# Patient Record
Sex: FEMALE | Race: WHITE | Employment: OTHER | URBAN - METROPOLITAN AREA
[De-identification: names, ages, dates, MRNs, and addresses within clinical notes are randomized per-mention and may not be internally consistent; named-entity substitution may affect disease eponyms.]

---

## 2017-09-15 PROBLEM — E11.42 TYPE 2 DIABETES MELLITUS WITH DIABETIC POLYNEUROPATHY, WITHOUT LONG-TERM CURRENT USE OF INSULIN (HCC): Status: ACTIVE | Noted: 2017-09-15

## 2017-09-15 PROBLEM — E78.2 MIXED HYPERLIPIDEMIA: Status: ACTIVE | Noted: 2017-09-15

## 2018-02-20 ENCOUNTER — APPOINTMENT (OUTPATIENT)
Dept: GENERAL RADIOLOGY | Age: 83
End: 2018-02-20
Attending: EMERGENCY MEDICINE
Payer: MEDICARE

## 2018-02-20 ENCOUNTER — APPOINTMENT (OUTPATIENT)
Dept: CT IMAGING | Age: 83
End: 2018-02-20
Attending: EMERGENCY MEDICINE
Payer: MEDICARE

## 2018-02-20 ENCOUNTER — HOME HEALTH ADMISSION (OUTPATIENT)
Dept: HOME HEALTH SERVICES | Facility: HOME HEALTH | Age: 83
End: 2018-02-20

## 2018-02-20 ENCOUNTER — HOSPITAL ENCOUNTER (EMERGENCY)
Age: 83
Discharge: HOME | End: 2018-02-20
Attending: EMERGENCY MEDICINE
Payer: MEDICARE

## 2018-02-20 VITALS
WEIGHT: 143 LBS | DIASTOLIC BLOOD PRESSURE: 86 MMHG | SYSTOLIC BLOOD PRESSURE: 188 MMHG | HEIGHT: 66 IN | HEART RATE: 69 BPM | RESPIRATION RATE: 13 BRPM | TEMPERATURE: 98 F | BODY MASS INDEX: 22.98 KG/M2 | OXYGEN SATURATION: 95 %

## 2018-02-20 DIAGNOSIS — I10 ESSENTIAL HYPERTENSION: ICD-10-CM

## 2018-02-20 DIAGNOSIS — S42.202A CLOSED FRACTURE OF PROXIMAL END OF LEFT HUMERUS, UNSPECIFIED FRACTURE MORPHOLOGY, INITIAL ENCOUNTER: Primary | ICD-10-CM

## 2018-02-20 DIAGNOSIS — S70.02XA CONTUSION OF LEFT HIP, INITIAL ENCOUNTER: ICD-10-CM

## 2018-02-20 DIAGNOSIS — R11.2 NAUSEA AND VOMITING, INTRACTABILITY OF VOMITING NOT SPECIFIED, UNSPECIFIED VOMITING TYPE: ICD-10-CM

## 2018-02-20 LAB
ALBUMIN SERPL-MCNC: 3.3 G/DL (ref 3.2–4.6)
ALBUMIN/GLOB SERPL: 0.8 {RATIO} (ref 1.2–3.5)
ALP SERPL-CCNC: 64 U/L (ref 50–136)
ALT SERPL-CCNC: 41 U/L (ref 12–65)
ANION GAP SERPL CALC-SCNC: 9 MMOL/L (ref 7–16)
AST SERPL-CCNC: 55 U/L (ref 15–37)
ATRIAL RATE: 70 BPM
BASOPHILS # BLD: 0 K/UL (ref 0–0.2)
BASOPHILS NFR BLD: 0 % (ref 0–2)
BILIRUB SERPL-MCNC: 0.5 MG/DL (ref 0.2–1.1)
BUN SERPL-MCNC: 5 MG/DL (ref 8–23)
CALCIUM SERPL-MCNC: 8 MG/DL (ref 8.3–10.4)
CALCULATED P AXIS, ECG09: 57 DEGREES
CALCULATED R AXIS, ECG10: 42 DEGREES
CALCULATED T AXIS, ECG11: 53 DEGREES
CHLORIDE SERPL-SCNC: 99 MMOL/L (ref 98–107)
CK SERPL-CCNC: 92 U/L (ref 21–215)
CO2 SERPL-SCNC: 29 MMOL/L (ref 21–32)
CREAT SERPL-MCNC: 0.61 MG/DL (ref 0.6–1)
DIAGNOSIS, 93000: NORMAL
DIFFERENTIAL METHOD BLD: ABNORMAL
EOSINOPHIL # BLD: 0.1 K/UL (ref 0–0.8)
EOSINOPHIL NFR BLD: 1 % (ref 0.5–7.8)
ERYTHROCYTE [DISTWIDTH] IN BLOOD BY AUTOMATED COUNT: 13.4 % (ref 11.9–14.6)
GLOBULIN SER CALC-MCNC: 4.1 G/DL (ref 2.3–3.5)
GLUCOSE SERPL-MCNC: 141 MG/DL (ref 65–100)
HCT VFR BLD AUTO: 33.9 % (ref 35.8–46.3)
HGB BLD-MCNC: 11.4 G/DL (ref 11.7–15.4)
IMM GRANULOCYTES # BLD: 0 K/UL (ref 0–0.5)
IMM GRANULOCYTES NFR BLD AUTO: 0 % (ref 0–5)
LYMPHOCYTES # BLD: 2.3 K/UL (ref 0.5–4.6)
LYMPHOCYTES NFR BLD: 19 % (ref 13–44)
MCH RBC QN AUTO: 30.4 PG (ref 26.1–32.9)
MCHC RBC AUTO-ENTMCNC: 33.6 G/DL (ref 31.4–35)
MCV RBC AUTO: 90.4 FL (ref 79.6–97.8)
MONOCYTES # BLD: 0.7 K/UL (ref 0.1–1.3)
MONOCYTES NFR BLD: 6 % (ref 4–12)
NEUTS SEG # BLD: 8.8 K/UL (ref 1.7–8.2)
NEUTS SEG NFR BLD: 74 % (ref 43–78)
P-R INTERVAL, ECG05: 196 MS
PLATELET # BLD AUTO: 324 K/UL (ref 150–450)
PMV BLD AUTO: 10.3 FL (ref 10.8–14.1)
POTASSIUM SERPL-SCNC: 3.2 MMOL/L (ref 3.5–5.1)
PROT SERPL-MCNC: 7.4 G/DL (ref 6.3–8.2)
Q-T INTERVAL, ECG07: 416 MS
QRS DURATION, ECG06: 84 MS
QTC CALCULATION (BEZET), ECG08: 449 MS
RBC # BLD AUTO: 3.75 M/UL (ref 4.05–5.25)
SODIUM SERPL-SCNC: 137 MMOL/L (ref 136–145)
TROPONIN I SERPL-MCNC: <0.02 NG/ML (ref 0.02–0.05)
VENTRICULAR RATE, ECG03: 70 BPM
WBC # BLD AUTO: 12 K/UL (ref 4.3–11.1)

## 2018-02-20 PROCEDURE — 74011250636 HC RX REV CODE- 250/636: Performed by: EMERGENCY MEDICINE

## 2018-02-20 PROCEDURE — 85025 COMPLETE CBC W/AUTO DIFF WBC: CPT | Performed by: EMERGENCY MEDICINE

## 2018-02-20 PROCEDURE — 73030 X-RAY EXAM OF SHOULDER: CPT

## 2018-02-20 PROCEDURE — 82550 ASSAY OF CK (CPK): CPT | Performed by: EMERGENCY MEDICINE

## 2018-02-20 PROCEDURE — 77030011943

## 2018-02-20 PROCEDURE — 81003 URINALYSIS AUTO W/O SCOPE: CPT | Performed by: EMERGENCY MEDICINE

## 2018-02-20 PROCEDURE — A4565 SLINGS: HCPCS

## 2018-02-20 PROCEDURE — 96375 TX/PRO/DX INJ NEW DRUG ADDON: CPT | Performed by: EMERGENCY MEDICINE

## 2018-02-20 PROCEDURE — 74011250637 HC RX REV CODE- 250/637: Performed by: EMERGENCY MEDICINE

## 2018-02-20 PROCEDURE — 93005 ELECTROCARDIOGRAM TRACING: CPT | Performed by: EMERGENCY MEDICINE

## 2018-02-20 PROCEDURE — 96374 THER/PROPH/DIAG INJ IV PUSH: CPT | Performed by: EMERGENCY MEDICINE

## 2018-02-20 PROCEDURE — 73502 X-RAY EXAM HIP UNI 2-3 VIEWS: CPT

## 2018-02-20 PROCEDURE — 51701 INSERT BLADDER CATHETER: CPT | Performed by: EMERGENCY MEDICINE

## 2018-02-20 PROCEDURE — 72125 CT NECK SPINE W/O DYE: CPT

## 2018-02-20 PROCEDURE — 80053 COMPREHEN METABOLIC PANEL: CPT | Performed by: EMERGENCY MEDICINE

## 2018-02-20 PROCEDURE — 71045 X-RAY EXAM CHEST 1 VIEW: CPT

## 2018-02-20 PROCEDURE — 70450 CT HEAD/BRAIN W/O DYE: CPT

## 2018-02-20 PROCEDURE — 84484 ASSAY OF TROPONIN QUANT: CPT | Performed by: EMERGENCY MEDICINE

## 2018-02-20 PROCEDURE — 96361 HYDRATE IV INFUSION ADD-ON: CPT | Performed by: EMERGENCY MEDICINE

## 2018-02-20 PROCEDURE — 99285 EMERGENCY DEPT VISIT HI MDM: CPT | Performed by: EMERGENCY MEDICINE

## 2018-02-20 RX ORDER — HYDRALAZINE HYDROCHLORIDE 20 MG/ML
10 INJECTION INTRAMUSCULAR; INTRAVENOUS
Status: COMPLETED | OUTPATIENT
Start: 2018-02-20 | End: 2018-02-20

## 2018-02-20 RX ORDER — ONDANSETRON 4 MG/1
4 TABLET, ORALLY DISINTEGRATING ORAL
Qty: 6 TAB | Refills: 0 | Status: SHIPPED | OUTPATIENT
Start: 2018-02-20 | End: 2018-08-15

## 2018-02-20 RX ORDER — SODIUM CHLORIDE 9 MG/ML
250 INJECTION, SOLUTION INTRAVENOUS ONCE
Status: COMPLETED | OUTPATIENT
Start: 2018-02-20 | End: 2018-02-20

## 2018-02-20 RX ORDER — MORPHINE SULFATE 4 MG/ML
2 INJECTION, SOLUTION INTRAMUSCULAR; INTRAVENOUS
Status: COMPLETED | OUTPATIENT
Start: 2018-02-20 | End: 2018-02-20

## 2018-02-20 RX ORDER — MORPHINE SULFATE 15 MG/1
7.5 TABLET ORAL
Qty: 12 TAB | Refills: 0 | Status: SHIPPED | OUTPATIENT
Start: 2018-02-20 | End: 2018-04-13

## 2018-02-20 RX ORDER — SODIUM CHLORIDE 9 MG/ML
100 INJECTION, SOLUTION INTRAVENOUS ONCE
Status: COMPLETED | OUTPATIENT
Start: 2018-02-20 | End: 2018-02-20

## 2018-02-20 RX ORDER — ACETAMINOPHEN 325 MG/1
650 TABLET ORAL
Status: COMPLETED | OUTPATIENT
Start: 2018-02-20 | End: 2018-02-20

## 2018-02-20 RX ORDER — ONDANSETRON 2 MG/ML
4 INJECTION INTRAMUSCULAR; INTRAVENOUS
Status: COMPLETED | OUTPATIENT
Start: 2018-02-20 | End: 2018-02-20

## 2018-02-20 RX ADMIN — HYDRALAZINE HYDROCHLORIDE 10 MG: 20 INJECTION INTRAMUSCULAR; INTRAVENOUS at 08:25

## 2018-02-20 RX ADMIN — ACETAMINOPHEN 650 MG: 325 TABLET ORAL at 09:06

## 2018-02-20 RX ADMIN — SODIUM CHLORIDE 100 ML/HR: 900 INJECTION, SOLUTION INTRAVENOUS at 07:08

## 2018-02-20 RX ADMIN — MORPHINE SULFATE 2 MG: 4 INJECTION, SOLUTION INTRAMUSCULAR; INTRAVENOUS at 09:14

## 2018-02-20 RX ADMIN — SODIUM CHLORIDE 250 ML: 900 INJECTION, SOLUTION INTRAVENOUS at 10:03

## 2018-02-20 RX ADMIN — SODIUM CHLORIDE 250 ML: 900 INJECTION, SOLUTION INTRAVENOUS at 07:08

## 2018-02-20 RX ADMIN — SODIUM CHLORIDE 250 ML: 900 INJECTION, SOLUTION INTRAVENOUS at 11:05

## 2018-02-20 RX ADMIN — ONDANSETRON 4 MG: 2 INJECTION INTRAMUSCULAR; INTRAVENOUS at 11:05

## 2018-02-20 NOTE — ED NOTES
Pt drowsy from morphine given. Sling placed on pt. This RN to wait until pt is more alert before ambulating. MD notified.

## 2018-02-20 NOTE — PROGRESS NOTES
[facility]  Face to Face Encounter    Patients Name: Bryan Moya    YOB: 1935    Ordering Physician: Susie Glass MD         Primary Diagnosis: Closed fracture of proximal end of left humerus, unspecified fracture morphology, initial encounter Bradley Proper; Essential hypertension [I10]      Date of Face to Face:   2/20/2018                                  Face to Face Encounter findings are related to primary reason for home care:   yes. 1. I certify that the patient needs intermittent care as follows: skilled nursing care:  skilled observation/assessment, patient education  physical therapy: strengthening, transfer training, gait/stair training, balance training and pt/caregiver education  occupational therapy:  ADL safety (ie. cooking, bathing, dressing)    2. I certify that this patient is homebound, that is: 1) patient requires the use of a cane, walker and wheelchair device, special transportation, or assistance of another to leave the home; or 2) patient's condition makes leaving the home medically contraindicated; and 3) patient has a normal inability to leave the home and leaving the home requires considerable and taxing effort. Patient may leave the home for infrequent and short duration for medical reasons, and occasional absences for non-medical reasons. Homebound status is due to the following functional limitations: Patient with strength deficits limiting the performance of all ADL's without caregiver assistance or the use of an assistive device. Patient with poor safety awareness and is at risk for falls without assistance of another person and the use of an assistive device. Patient with poor ambulation endurance limiting their safe ability to ascend/descend the required number of steps to leave the home.     3. I certify that this patient is under my care and that I, or a nurse practitioner or [phone] [id number], or clinical nurse specialist, or certified nurse midwife, working with me, had a Face-to-Face Encounter that meets the physician Face-to-Face Encounter requirements. The following are the clinical findings from the [address] encounter that support the need for skilled services and is a summary of the encounter:     See attached progess note      Darling Corona  2/20/2018      THE FOLLOWING TO BE COMPLETED BY THE COMMUNITY PHYSICIAN:    I concur with the findings described above from the F2F encounter that this patient is homebound and in need of a skilled service.     Certifying Physician: _____________________________________      Printed Certifying Physician Name: _____________________________________    Date: _________________

## 2018-02-20 NOTE — ED NOTES
I have reviewed discharge instructions with the patient. The patient verbalized understanding. Patient left ED via Discharge Method: stretcher to Home with [facility]    Opportunity for questions and clarification provided. Patient given 2 scripts. To continue your aftercare when you leave the hospital, you may receive an automated call from our care team to check in on how you are doing. This is a free service and part of our promise to provide the best care and service to meet your aftercare needs.  If you have questions, or wish to unsubscribe from this service please call [phone]. Thank you for Choosing our [facility] Emergency Department.

## 2018-02-20 NOTE — ED PROVIDER NOTES
HPI Comments: Patient was found on the floor next to her bed this morning. Apparently she had gotten up to go to the bathroom and fallen. She has no recollection of what happened and may have lost consciousness. His unclear how long she was on the floor for. She estimates 15 minutes. Family members report she has been talking out of her head and confused for a couple of days. She has had a cough for last couple of weeks but it had been improving over the last few days. Some recent increased urination reported. Patient is on metformin for diabetes yet drinks 6 sodas a day and the only thing she during eats is caked and junk food. Patient is a 81 y.o. female presenting with fall. The history is provided by the patient. Fall   The accident occurred 3 to 5 hours ago. She fell from a height of ground level. She landed on hard floor. Point of impact: left shoulder and left hip. Pain location: left shoulder and left hip. The pain is moderate. Associated symptoms include loss of consciousness. Pertinent negatives include no fever, no numbness, no abdominal pain, no nausea, no vomiting, no headaches and no tingling. The risk factors include being elderly. The symptoms are aggravated by use of injured limb.         Past Medical History:   Diagnosis Date    Anemia, unspecified     Calculus of kidney     Depressive disorder, not elsewhere classified     Diabetes ([address]     Hypertension     Hypocalcemia and hypomagnesemia of      Hypopotassemia     Hyposmolality and/or hyponatremia     Insomnia, unspecified     Irritable bowel syndrome     Other and unspecified hyperlipidemia 2015    Polyneuropathy in diabetes ([address]     Renal colic     Type II or unspecified type diabetes mellitus without mention of complication, not stated as uncontrolled     Unspecified essential hypertension        Past Surgical History:   Procedure Laterality Date    HX APPENDECTOMY      HX CHOLECYSTECTOMY      HX GYN      hysterectomy: Partial/ Ovaries remain    HX HEENT      HX TONSILLECTOMY      HX UROLOGICAL      Bladder Surgery         Family History:   Problem Relation Age of Onset    Hypertension Mother     Stroke Mother     Stroke Father     Hypertension Father        Social History     Social History    Marital status:      Spouse name: N/A    Number of children: N/A    Years of education: N/A     Occupational History    Not on file. Social History Main Topics    Smoking status: Never Smoker    Smokeless tobacco: Never Used    Alcohol use No    Drug use: No    Sexual activity: Not on file     Other Topics Concern    Not on file     Social History Narrative         ALLERGIES: Sulfa (sulfonamide antibiotics)    Review of Systems   Constitutional: Negative for chills and fever. HENT: Negative for congestion, rhinorrhea and sore throat. Eyes: Negative for photophobia and redness. Respiratory: Negative for cough and shortness of breath. Cardiovascular: Negative for chest pain and leg swelling. Gastrointestinal: Negative for abdominal pain, diarrhea, nausea and vomiting. Endocrine: Positive for polyuria. Negative for polydipsia. Genitourinary: Positive for frequency. Negative for dysuria. Musculoskeletal: Positive for neck pain. Negative for back pain and myalgias. Neurological: Positive for loss of consciousness. Negative for tingling, weakness, numbness and headaches. Vitals:    02/20/18 0554 02/20/18 0601 02/20/18 0645   BP: (!) 198/99 (!) 183/120 (!) 198/91   Pulse: 73 72 72   Resp: 18 17 13   Temp: 98 °F (36.7 °C)     SpO2: 94% 95% 95%   Weight: 64.9 kg (143 lb)     Height: 5' 6\" (1.676 m)              Physical Exam   Constitutional: She is oriented to person, place, and time. She appears well-developed and well-nourished. Eyes: Conjunctivae are normal. Pupils are equal, round, and reactive to light. Neck: Normal range of motion. Neck supple.  Spinous process tenderness (mild) present. No muscular tenderness present. Cardiovascular: Normal rate, regular rhythm and normal heart sounds. No murmur heard. Pulses:       Carotid pulses are 2+ on the right side, and 2+ on the left side. Radial pulses are 2+ on the right side, and 2+ on the left side. Dorsalis pedis pulses are 2+ on the right side, and 2+ on the left side. Posterior tibial pulses are 2+ on the right side, and 2+ on the left side. Pulmonary/Chest: Breath sounds normal. No respiratory distress. Abdominal: Soft. She exhibits no distension. There is no tenderness. There is no rebound and no guarding. Musculoskeletal: Normal range of motion. She exhibits tenderness (tenderness left lateralhip. No significant pain with internal and external rotation. No external rotation or shortening. No tenderness to palpation of the femur and knee or lower leg ankle or foot that is significant. Left lower extremity neurovascula). She exhibits no edema. There is pain and tenderness about the left proximal humerus and shoulder area. There is pain with internal and external rotation or attempts at flexion. No tenderness over the clavicle or scapula. No elbow forearm wrist or hand tenderness is noted. Left upper extremity is neurovascularly intact. Neurological: She is alert and oriented to person, place, and time. She has normal strength. No sensory deficit. Skin: Skin is warm and dry. Nursing note and vitals reviewed. MDM  Number of Diagnoses or Management Options  Closed fracture of proximal end of left humerus, unspecified fracture morphology, initial encounter:   Contusion of left hip, initial encounter:   Essential hypertension:   Nausea and vomiting, intractability of vomiting not specified, unspecified vomiting type:   Diagnosis management comments: Patient is able to stand and walk without any hip pain. Doubt fracture. X-ray was negative. MRI not needed.   Patient is awake and alert and oriented after some IV hydration. Blood pressure improved slightly but patient will take her medications that she did not take this morning upon return home.        Amount and/or Complexity of Data Reviewed  Clinical lab tests: ordered and reviewed (Results for orders placed or performed during the hospital encounter of 02/20/18  -CBC WITH AUTOMATED DIFF       Result                                            Value                         Ref Range                       WBC                                               12.0 (H)                      4.3 - 11.1 K/uL                 RBC                                               3.75 (L)                      4.05 - 5.25 M/uL                HGB                                               11.4 (L)                      11.7 - 15.4 g/dL                HCT                                               33.9 (L)                      35.8 - 46.3 %                   MCV                                               90.4                          79.6 - 97.8 FL                  MCH                                               30.4                          26.1 - 32.9 PG                  MCHC                                              33.6                          31.4 - 35.0 g/dL                RDW                                               13.4                          11.9 - 14.6 %                   PLATELET                                          324                           150 - 450 K/uL                  MPV                                               10.3 (L)                      10.8 - 14.1 FL                  DF                                                AUTOMATED                                                     NEUTROPHILS                                       74                            43 - 78 %                       LYMPHOCYTES                                       19                            13 - 44 % MONOCYTES                                         6                             4.0 - 12.0 %                    EOSINOPHILS                                       1                             0.5 - 7.8 %                     BASOPHILS                                         0                             0.0 - 2.0 %                     IMMATURE GRANULOCYTES                             0                             0.0 - 5.0 %                     ABS. NEUTROPHILS                                  8.8 (H)                       1.7 - 8.2 K/UL                  ABS. LYMPHOCYTES                                  2.3                           0.5 - 4.6 K/UL                  ABS. MONOCYTES                                    0.7                           0.1 - 1.3 K/UL                  ABS. EOSINOPHILS                                  0.1                           0.0 - 0.8 K/UL                  ABS. BASOPHILS                                    0.0                           0.0 - 0.2 K/UL                  ABS. IMM.  GRANS.                                  0.0                           0.0 - 0.5 K/UL             -METABOLIC PANEL, COMPREHENSIVE       Result                                            Value                         Ref Range                       Sodium                                            137                           136 - 145 mmol/L                Potassium                                         3.2 (L)                       3.5 - 5.1 mmol/L                Chloride                                          99                            98 - 107 mmol/L                 CO2                                               29                            21 - 32 mmol/L                  Anion gap                                         9                             7 - 16 mmol/L                   Glucose                                           141 (H)                       65 - 100 mg/dL                  BUN 5 (L)                         8 - 23 MG/DL                    Creatinine                                        0.61                          0.6 - 1.0 MG/DL                 GFR est AA                                        >60                           >60 ml/min/1.73m2               GFR est non-AA                                    >60                           >60 ml/min/1.73m2               Calcium                                           8.0 (L)                       8.3 - 10.4 MG/DL                Bilirubin, total                                  0.5                           0.2 - 1.1 MG/DL                 ALT (SGPT)                                        41                            12 - 65 U/L                     AST (SGOT)                                        55 (H)                        15 - 37 U/L                     Alk. phosphatase                                  64                            50 - 136 U/L                    Protein, total                                    7.4                           6.3 - 8.2 g/dL                  Albumin                                           3.3                           3.2 - 4.6 g/dL                  Globulin                                          4.1 (H)                       2.3 - 3.5 g/dL                  A-G Ratio                                         0.8 (L)                       1.2 - 3.5                  -CK       Result                                            Value                         Ref Range                       CK                                                92                            21 - 215 U/L               -TROPONIN I       Result                                            Value                         Ref Range                       Troponin-I, Qt.                                   <0.02 (L)                     0.02 - 0.05 NG/ML          -EKG, 12 LEAD, INITIAL       Result Value                         Ref Range                       Ventricular Rate                                  70                            BPM                             Atrial Rate                                       70                            BPM                             P-R Interval                                      196                           ms                              QRS Duration                                      84                            ms                              Q-T Interval                                      416                           ms                              QTC Calculation (Bezet)                           449                           ms                              Calculated P Axis                                 57                            degrees                         Calculated R Axis                                 42                            degrees                         Calculated T Axis                                 53                            degrees                         Diagnosis                                                                                                   !! AGE AND GENDER SPECIFIC ECG ANALYSIS !! Normal sinus rhythm   ST abnormality, possible digitalis effect   Abnormal ECG   When compared with ECG of 15-JUL-2015 21:04,   No significant change was found   Confirmed by ST AHMET ERVIN MD (), PRISCILLA CH ([id number]) on 2/20/2018 11:21:58 AM     )  Tests in the radiology section of CPT®: ordered and reviewed (Xr Chest Sngl V    Result Date: 2/20/2018  AP chest radiograph History: sob, 82 years Female Shoulder fx fall Comparison: Chest radiograph July 15, 2015, left shoulder radiographs today Findings:   Normal cardiomediastinal silhouette. Improved lung volumes on the right. Persistent mild diffuse interstitial prominence, nonspecific.   Mild subsegmental atelectasis bilateral lung bases. No evidence of pneumothorax, pleural effusion, or air space consolidation. Evidence of cholecystectomy. Again visualized is the acute nondisplaced left humeral neck fracture. Visualized soft tissue and osseous structures otherwise unremarkable. Impression:  Improved lung volumes on the right. Xr Shoulder Lt Ap/lat Min 2 V    Result Date: 2/20/2018  Left Shoulder INDICATION: Fall. Pain. COMPARISON: Chest x-ray July 15, 2015 TECHNIQUE: Three views of the left shoulder were obtained FINDINGS: There is fracture of the left humeral neck. There is suggestion of nondisplaced greater tuberosity fracture. There is slight angulation. The left clavicle and AC joint are intact. Visualized left upper lung is unremarkable. IMPRESSION: 1. Left humeral head/neck fracture. 2. Osteopenia. Xr Hip Lt W Or Wo Pelv 2-3 Vws    Result Date: 2/20/2018  Left Hip INDICATION: Tal Richard, Pain COMPARISON: None TECHNIQUE: Three views of the left hip were obtained. FINDINGS: No acute left hip fracture or dislocation. SI joints and pubic symphysis are intact. Visualized right hip is unremarkable. Bones are osteopenic. IMPRESSION: No acute left hip fracture is seen. No dislocation. If there is high clinical suspicion for fracture, MRI is more sensitive. Ct Head Wo Cont    Result Date: 2/20/2018  Examination: CT scan of the brain without contrast. History: fall, 82 years Female Pt complains left shoulder pain following fall. EMS called to home by son. PT does not recall walking from bed or falling. No head injury. Pt also complains of left hip pain Technique: 5 mm axial imaging of the brain from the posterior fossa to the vertex. Radiation dose reduction techniques were used for this study:  Our CT scanners use one or all of the following: Automated exposure control, adjustment of the mA and/or kVp according to patient's size, iterative reconstruction. Comparison:  CT brain July 15, 2015 Findings:   The brain parenchyma appears within normal limits for age. The ventricles, sulci are age-appropriate. No intracranial hemorrhage or extra-axial collection is identified. No evidence of acute infarct. No mass effect or midline shift is present. Basal cisterns are intact. The visualized paranasal sinuses and mastoid air cells are clear. The orbits, bones, and soft tissues are normal in appearance. IMPRESSION:  Normal unenhanced CT of the brain for age. No acute intracranial abnormality. Ct Spine Cerv Wo Cont    Result Date: 2/20/2018  Exam:  CT cervical spine without contrast History: Cervical spine fracture, 81 years Female Pt complains left shoulder pain following fall. EMS called to home by son. PT does not recall walking from bed or falling. No head injury. Pt also complains of left hip pain Technique: Standard departmental protocol CT of the cervical spine was performed without intravenous contrast administration. Coronal and sagittal reformats were obtained. Radiation dose reduction techniques were used for this study: Our CT scanners use one or all of the following: Automated exposure control, adjustment of the mA and/or kVp according to patient's size, iterative reconstruction. Comparison: None available Findings:  Normal alignment. Vertebral body height generally preserved. Mild loss of disc space height C5-C7 with moderate anterior and posterior endplate osteophytes. No evidence of acute fracture or subluxation. Probably severe left neural foraminal stenosis at C3-C4, C5-C6, C6-C7. Normal mineralization. Mild biapical scarring. 12 mm hypodense nodule in the right thyroid. Visualized prevertebral and paravertebral soft tissues unremarkable. Impression:  No evidence of acute injury.   Moderate to severe lower cervical degenerative disc disease as above.    )          ED Course       Procedures

## 2018-02-20 NOTE — PROGRESS NOTES
Spoke with patient and son about home health. [facility] referred to [facility].. Orders include referral to PT, OT, Nursing and personal care aid. Discussed with MD and primary nurse all in agreement. Patient lives with her son Trina Dockery: [phone]) at this time. Address: [address] [place] [address] [place]  [place] [phone]

## 2018-02-20 NOTE — DISCHARGE INSTRUCTIONS
Broken Arm: Care Instructions  Your Care Instructions  Fractures can range from a small, hairline crack, to a bone or bones broken into two or more pieces. Your treatment depends on how bad the break is. Your doctor may have put your arm in a splint or cast to allow it to heal or to keep it stable until you see another doctor. It may take weeks or months for your arm to heal. You can help your arm heal with some care at home. You heal best when you take good care of yourself. Eat a variety of healthy foods, and don't smoke. You may have had a sedative to help you relax. You may be unsteady after having sedation. It can take a few hours for the medicine's effects to wear off. Common side effects of sedation include nausea, vomiting, and feeling sleepy or tired. The doctor has checked you carefully, but problems can develop later. If you notice any problems or new symptoms, get medical treatment right away. Follow-up care is a key part of your treatment and safety. Be sure to make and go to all appointments, and call your doctor if you are having problems. It's also a good idea to know your test results and keep a list of the medicines you take. How can you care for yourself at home? · If the doctor gave you a sedative:  ¨ For 24 hours, don't do anything that requires attention to detail. It takes time for the medicine's effects to completely wear off. ¨ For your safety, do not drive or operate any machinery that could be dangerous. Wait until the medicine wears off and you can think clearly and react easily. · Put ice or a cold pack on your arm for 10 to 20 minutes at a time. Try to do this every 1 to 2 hours for the next 3 days (when you are awake). Put a thin cloth between the ice and your cast or splint. Keep the cast or splint dry. · Follow the cast care instructions your doctor gives you. If you have a splint, do not take it off unless your doctor tells you to. · Be safe with medicines.  Take pain medicines exactly as directed. ¨ If the doctor gave you a prescription medicine for pain, take it as prescribed. ¨ If you are not taking a prescription pain medicine, ask your doctor if you can take an over-the-counter medicine. · Prop up your arm on pillows when you sit or lie down in the first few days after the injury. Keep the arm higher than the level of your heart. This will help reduce swelling. · Follow instructions for exercises to keep your arm strong. · Wiggle your fingers and wrist often to reduce swelling and stiffness. When should you call for help? Call 911 anytime you think you may need emergency care. For example, call if:  ? · You are very sleepy and you have trouble waking up. ?Call your doctor now or seek immediate medical care if:  ? · You have new or worse nausea or vomiting. ? · You have new or worse pain. ? · Your hand or fingers are cool or pale or change color. ? · Your cast or splint feels too tight. ? · You have tingling, weakness, or numbness in your hand or fingers. ? Watch closely for changes in your health, and be sure to contact your doctor if:  ? · You do not get better as expected. ? · You have problems with your cast or splint. Where can you learn more? Go to http://pam-macrina.info/. Enter E497 in the search box to learn more about \"Broken Arm: Care Instructions. \"  Current as of: March 21, 2017  Content Version: 11.4  © 9024-4251 [employer]. Care instructions adapted under license by [employer] (which disclaims liability or warranty for this information). If you have questions about a medical condition or this instruction, always ask your healthcare professional. [place] [address] any warranty or liability for your use of this information.

## 2018-04-10 ENCOUNTER — ANESTHESIA EVENT (OUTPATIENT)
Dept: SURGERY | Age: 83
DRG: 481 | End: 2018-04-10
Payer: MEDICARE

## 2018-04-10 ENCOUNTER — APPOINTMENT (OUTPATIENT)
Dept: GENERAL RADIOLOGY | Age: 83
DRG: 481 | End: 2018-04-10
Attending: EMERGENCY MEDICINE
Payer: MEDICARE

## 2018-04-10 ENCOUNTER — HOSPITAL ENCOUNTER (INPATIENT)
Age: 83
LOS: 3 days | Discharge: NURSING, REHAB OR LONG-TERM CARE | DRG: 481 | End: 2018-04-13
Attending: EMERGENCY MEDICINE | Admitting: INTERNAL MEDICINE
Payer: MEDICARE

## 2018-04-10 DIAGNOSIS — F41.1 GAD (GENERALIZED ANXIETY DISORDER): ICD-10-CM

## 2018-04-10 DIAGNOSIS — S72.001A CLOSED FRACTURE OF RIGHT HIP, INITIAL ENCOUNTER (HCC): Primary | ICD-10-CM

## 2018-04-10 PROBLEM — S73.004A HIP DISLOCATION, RIGHT (HCC): Status: ACTIVE | Noted: 2018-04-10

## 2018-04-10 PROBLEM — N39.0 UTI (URINARY TRACT INFECTION): Status: ACTIVE | Noted: 2018-04-10

## 2018-04-10 LAB
ABO + RH BLD: NORMAL
ANION GAP SERPL CALC-SCNC: 7 MMOL/L (ref 7–16)
APPEARANCE UR: ABNORMAL
APTT PPP: 33.8 SEC (ref 23.2–35.3)
ATRIAL RATE: 78 BPM
BACTERIA SPEC CULT: ABNORMAL
BACTERIA SPEC CULT: ABNORMAL
BACTERIA URNS QL MICRO: ABNORMAL /HPF
BASOPHILS # BLD: 0 K/UL (ref 0–0.2)
BASOPHILS NFR BLD: 0 % (ref 0–2)
BILIRUB UR QL: NEGATIVE
BLOOD GROUP ANTIBODIES SERPL: NORMAL
BUN SERPL-MCNC: 9 MG/DL (ref 8–23)
CALCIUM SERPL-MCNC: 8.5 MG/DL (ref 8.3–10.4)
CALCIUM SERPL-MCNC: 8.7 MG/DL (ref 8.3–10.4)
CALCULATED R AXIS, ECG10: 42 DEGREES
CALCULATED T AXIS, ECG11: 66 DEGREES
CASTS URNS QL MICRO: ABNORMAL /LPF
CHLORIDE SERPL-SCNC: 102 MMOL/L (ref 98–107)
CO2 SERPL-SCNC: 27 MMOL/L (ref 21–32)
COLOR UR: YELLOW
CREAT SERPL-MCNC: 0.89 MG/DL (ref 0.6–1)
DIAGNOSIS, 93000: NORMAL
DIFFERENTIAL METHOD BLD: ABNORMAL
EOSINOPHIL # BLD: 0.1 K/UL (ref 0–0.8)
EOSINOPHIL NFR BLD: 1 % (ref 0.5–7.8)
EPI CELLS #/AREA URNS HPF: 0 /HPF
ERYTHROCYTE [DISTWIDTH] IN BLOOD BY AUTOMATED COUNT: 14.4 % (ref 11.9–14.6)
GLUCOSE BLD STRIP.AUTO-MCNC: 110 MG/DL (ref 65–100)
GLUCOSE BLD STRIP.AUTO-MCNC: 116 MG/DL (ref 65–100)
GLUCOSE BLD STRIP.AUTO-MCNC: 125 MG/DL (ref 65–100)
GLUCOSE SERPL-MCNC: 126 MG/DL (ref 65–100)
GLUCOSE UR STRIP.AUTO-MCNC: NEGATIVE MG/DL
HCT VFR BLD AUTO: 32.4 % (ref 35.8–46.3)
HGB BLD-MCNC: 11 G/DL (ref 11.7–15.4)
HGB UR QL STRIP: NEGATIVE
IMM GRANULOCYTES # BLD: 0 K/UL (ref 0–0.5)
IMM GRANULOCYTES NFR BLD AUTO: 0 % (ref 0–5)
INR PPP: 1.1
KETONES UR QL STRIP.AUTO: NEGATIVE MG/DL
LEUKOCYTE ESTERASE UR QL STRIP.AUTO: ABNORMAL
LYMPHOCYTES # BLD: 2.1 K/UL (ref 0.5–4.6)
LYMPHOCYTES NFR BLD: 21 % (ref 13–44)
MCH RBC QN AUTO: 30.4 PG (ref 26.1–32.9)
MCHC RBC AUTO-ENTMCNC: 34 G/DL (ref 31.4–35)
MCV RBC AUTO: 89.5 FL (ref 79.6–97.8)
MONOCYTES # BLD: 0.6 K/UL (ref 0.1–1.3)
MONOCYTES NFR BLD: 7 % (ref 4–12)
NEUTS SEG # BLD: 7 K/UL (ref 1.7–8.2)
NEUTS SEG NFR BLD: 71 % (ref 43–78)
NITRITE UR QL STRIP.AUTO: NEGATIVE
PH UR STRIP: 6 [PH] (ref 5–9)
PLATELET # BLD AUTO: 328 K/UL (ref 150–450)
PMV BLD AUTO: 9.7 FL (ref 10.8–14.1)
POTASSIUM SERPL-SCNC: 3.5 MMOL/L (ref 3.5–5.1)
PREALB SERPL-MCNC: 18.2 MG/DL (ref 18–35.7)
PROT UR STRIP-MCNC: 100 MG/DL
PROTHROMBIN TIME: 13.4 SEC (ref 11.5–14.5)
PTH-INTACT SERPL-MCNC: 51.1 PG/ML (ref 18.5–88)
Q-T INTERVAL, ECG07: 396 MS
QRS DURATION, ECG06: 76 MS
QTC CALCULATION (BEZET), ECG08: 430 MS
RBC # BLD AUTO: 3.62 M/UL (ref 4.05–5.25)
RBC #/AREA URNS HPF: ABNORMAL /HPF
SERVICE CMNT-IMP: ABNORMAL
SODIUM SERPL-SCNC: 136 MMOL/L (ref 136–145)
SP GR UR REFRACTOMETRY: 1.01 (ref 1–1.02)
SPECIMEN EXP DATE BLD: NORMAL
UROBILINOGEN UR QL STRIP.AUTO: 0.2 EU/DL (ref 0.2–1)
VENTRICULAR RATE, ECG03: 71 BPM
WBC # BLD AUTO: 9.8 K/UL (ref 4.3–11.1)
WBC URNS QL MICRO: >100 /HPF

## 2018-04-10 PROCEDURE — 85610 PROTHROMBIN TIME: CPT | Performed by: EMERGENCY MEDICINE

## 2018-04-10 PROCEDURE — 87641 MR-STAPH DNA AMP PROBE: CPT | Performed by: NURSE PRACTITIONER

## 2018-04-10 PROCEDURE — 65270000029 HC RM PRIVATE

## 2018-04-10 PROCEDURE — 74011000258 HC RX REV CODE- 258: Performed by: INTERNAL MEDICINE

## 2018-04-10 PROCEDURE — 86900 BLOOD TYPING SEROLOGIC ABO: CPT | Performed by: EMERGENCY MEDICINE

## 2018-04-10 PROCEDURE — 77030005515 HC CATH URETH FOL14 BARD -B

## 2018-04-10 PROCEDURE — 82306 VITAMIN D 25 HYDROXY: CPT | Performed by: NURSE PRACTITIONER

## 2018-04-10 PROCEDURE — 74011250636 HC RX REV CODE- 250/636: Performed by: NURSE PRACTITIONER

## 2018-04-10 PROCEDURE — 74011250636 HC RX REV CODE- 250/636: Performed by: EMERGENCY MEDICINE

## 2018-04-10 PROCEDURE — 86580 TB INTRADERMAL TEST: CPT | Performed by: INTERNAL MEDICINE

## 2018-04-10 PROCEDURE — 93005 ELECTROCARDIOGRAM TRACING: CPT | Performed by: EMERGENCY MEDICINE

## 2018-04-10 PROCEDURE — 87086 URINE CULTURE/COLONY COUNT: CPT | Performed by: INTERNAL MEDICINE

## 2018-04-10 PROCEDURE — 84134 ASSAY OF PREALBUMIN: CPT | Performed by: NURSE PRACTITIONER

## 2018-04-10 PROCEDURE — 82962 GLUCOSE BLOOD TEST: CPT

## 2018-04-10 PROCEDURE — 81001 URINALYSIS AUTO W/SCOPE: CPT | Performed by: EMERGENCY MEDICINE

## 2018-04-10 PROCEDURE — 85025 COMPLETE CBC W/AUTO DIFF WBC: CPT | Performed by: EMERGENCY MEDICINE

## 2018-04-10 PROCEDURE — 83970 ASSAY OF PARATHORMONE: CPT | Performed by: NURSE PRACTITIONER

## 2018-04-10 PROCEDURE — 73552 X-RAY EXAM OF FEMUR 2/>: CPT

## 2018-04-10 PROCEDURE — 71045 X-RAY EXAM CHEST 1 VIEW: CPT

## 2018-04-10 PROCEDURE — 99284 EMERGENCY DEPT VISIT MOD MDM: CPT | Performed by: EMERGENCY MEDICINE

## 2018-04-10 PROCEDURE — 85730 THROMBOPLASTIN TIME PARTIAL: CPT | Performed by: EMERGENCY MEDICINE

## 2018-04-10 PROCEDURE — 74011250636 HC RX REV CODE- 250/636: Performed by: INTERNAL MEDICINE

## 2018-04-10 PROCEDURE — 74011000302 HC RX REV CODE- 302: Performed by: INTERNAL MEDICINE

## 2018-04-10 PROCEDURE — 80048 BASIC METABOLIC PNL TOTAL CA: CPT | Performed by: EMERGENCY MEDICINE

## 2018-04-10 PROCEDURE — 73502 X-RAY EXAM HIP UNI 2-3 VIEWS: CPT

## 2018-04-10 PROCEDURE — 74011250637 HC RX REV CODE- 250/637: Performed by: NURSE PRACTITIONER

## 2018-04-10 PROCEDURE — 74011250637 HC RX REV CODE- 250/637: Performed by: ORTHOPAEDIC SURGERY

## 2018-04-10 PROCEDURE — 51702 INSERT TEMP BLADDER CATH: CPT | Performed by: EMERGENCY MEDICINE

## 2018-04-10 RX ORDER — INSULIN LISPRO 100 [IU]/ML
INJECTION, SOLUTION INTRAVENOUS; SUBCUTANEOUS
Status: DISCONTINUED | OUTPATIENT
Start: 2018-04-10 | End: 2018-04-13 | Stop reason: HOSPADM

## 2018-04-10 RX ORDER — CEFAZOLIN SODIUM/WATER 2 G/20 ML
2 SYRINGE (ML) INTRAVENOUS
Status: DISCONTINUED | OUTPATIENT
Start: 2018-04-10 | End: 2018-04-11 | Stop reason: SDUPTHER

## 2018-04-10 RX ORDER — NALOXONE HYDROCHLORIDE 0.4 MG/ML
0.4 INJECTION, SOLUTION INTRAMUSCULAR; INTRAVENOUS; SUBCUTANEOUS
Status: DISCONTINUED | OUTPATIENT
Start: 2018-04-10 | End: 2018-04-13 | Stop reason: HOSPADM

## 2018-04-10 RX ORDER — HYDROMORPHONE HYDROCHLORIDE 2 MG/ML
0.5 INJECTION, SOLUTION INTRAMUSCULAR; INTRAVENOUS; SUBCUTANEOUS
Status: DISCONTINUED | OUTPATIENT
Start: 2018-04-10 | End: 2018-04-11

## 2018-04-10 RX ORDER — MORPHINE SULFATE 2 MG/ML
4 INJECTION, SOLUTION INTRAMUSCULAR; INTRAVENOUS ONCE
Status: DISCONTINUED | OUTPATIENT
Start: 2018-04-10 | End: 2018-04-10 | Stop reason: SDUPTHER

## 2018-04-10 RX ORDER — SODIUM CHLORIDE, SODIUM LACTATE, POTASSIUM CHLORIDE, CALCIUM CHLORIDE 600; 310; 30; 20 MG/100ML; MG/100ML; MG/100ML; MG/100ML
75 INJECTION, SOLUTION INTRAVENOUS
Status: COMPLETED | OUTPATIENT
Start: 2018-04-10 | End: 2018-04-11

## 2018-04-10 RX ORDER — SODIUM CHLORIDE 9 MG/ML
2000 INJECTION, SOLUTION INTRAVENOUS CONTINUOUS
Status: DISCONTINUED | OUTPATIENT
Start: 2018-04-10 | End: 2018-04-12

## 2018-04-10 RX ORDER — ONDANSETRON 2 MG/ML
4 INJECTION INTRAMUSCULAR; INTRAVENOUS
Status: DISCONTINUED | OUTPATIENT
Start: 2018-04-10 | End: 2018-04-11 | Stop reason: SDUPTHER

## 2018-04-10 RX ORDER — MORPHINE SULFATE 4 MG/ML
4 INJECTION, SOLUTION INTRAMUSCULAR; INTRAVENOUS ONCE
Status: COMPLETED | OUTPATIENT
Start: 2018-04-10 | End: 2018-04-10

## 2018-04-10 RX ORDER — MUPIROCIN CALCIUM 20 MG/G
CREAM TOPICAL 2 TIMES DAILY
Status: DISCONTINUED | OUTPATIENT
Start: 2018-04-10 | End: 2018-04-13 | Stop reason: HOSPADM

## 2018-04-10 RX ORDER — TRAMADOL HYDROCHLORIDE 50 MG/1
50 TABLET ORAL
Status: DISCONTINUED | OUTPATIENT
Start: 2018-04-10 | End: 2018-04-13 | Stop reason: HOSPADM

## 2018-04-10 RX ORDER — OXYCODONE HYDROCHLORIDE 5 MG/1
5 TABLET ORAL
Status: DISCONTINUED | OUTPATIENT
Start: 2018-04-10 | End: 2018-04-11 | Stop reason: SDUPTHER

## 2018-04-10 RX ORDER — CEFAZOLIN SODIUM/WATER 2 G/20 ML
2 SYRINGE (ML) INTRAVENOUS
Status: COMPLETED | OUTPATIENT
Start: 2018-04-10 | End: 2018-04-11

## 2018-04-10 RX ORDER — HYDRALAZINE HYDROCHLORIDE 20 MG/ML
10 INJECTION INTRAMUSCULAR; INTRAVENOUS
Status: DISCONTINUED | OUTPATIENT
Start: 2018-04-10 | End: 2018-04-11

## 2018-04-10 RX ORDER — ACETAMINOPHEN 325 MG/1
650 TABLET ORAL EVERY 8 HOURS
Status: DISCONTINUED | OUTPATIENT
Start: 2018-04-10 | End: 2018-04-13 | Stop reason: HOSPADM

## 2018-04-10 RX ADMIN — OXYCODONE HYDROCHLORIDE 5 MG: 5 TABLET ORAL at 15:03

## 2018-04-10 RX ADMIN — ACETAMINOPHEN 650 MG: 325 TABLET ORAL at 22:23

## 2018-04-10 RX ADMIN — TRAMADOL HYDROCHLORIDE 50 MG: 50 TABLET, FILM COATED ORAL at 22:23

## 2018-04-10 RX ADMIN — TUBERCULIN PURIFIED PROTEIN DERIVATIVE 5 UNITS: 5 INJECTION, SOLUTION INTRADERMAL at 15:02

## 2018-04-10 RX ADMIN — MUPIROCIN: 2 CREAM TOPICAL at 22:23

## 2018-04-10 RX ADMIN — ACETAMINOPHEN 650 MG: 325 TABLET ORAL at 15:03

## 2018-04-10 RX ADMIN — CEFTRIAXONE SODIUM 1 G: 1 INJECTION, POWDER, FOR SOLUTION INTRAMUSCULAR; INTRAVENOUS at 15:02

## 2018-04-10 RX ADMIN — ONDANSETRON 4 MG: 2 INJECTION INTRAMUSCULAR; INTRAVENOUS at 11:32

## 2018-04-10 RX ADMIN — MORPHINE SULFATE 4 MG: 4 INJECTION, SOLUTION INTRAMUSCULAR; INTRAVENOUS at 11:32

## 2018-04-10 RX ADMIN — SODIUM CHLORIDE 2000 ML: 900 INJECTION, SOLUTION INTRAVENOUS at 11:35

## 2018-04-10 NOTE — PROGRESS NOTES
TRANSFER - IN REPORT:    [facility] on Ildefonso Mon  being received from ER(unit) for routine progression of care      Report consisted of patients Situation, Background, Assessment and   Recommendations(SBAR). Information from the following report(s) SBAR, Kardex, ED Summary, Intake/Output, MAR and Recent Results was reviewed with the receiving nurse. Opportunity for questions and clarification was provided. Assessment completed upon patients arrival to unit and care assumed.

## 2018-04-10 NOTE — PROGRESS NOTES
ORTHO:    PATIENT TO BE ADMITTED FOR RIGHT HIP FRACTURE BY HOSPITALIST TO ROOM 737. PLEASE KEEP NPO AFTER MIDNIGHT. SURGERY PLANNED WITH DR. STEVEN TOMORROW.

## 2018-04-10 NOTE — ED NOTES
TRANSFER - OUT REPORT:    Verbal report given to Uma(name) on Khang Orona  being transferred to [facility](unit) for routine progression of care       Report consisted of patients Situation, Background, Assessment and   Recommendations(SBAR). Information from the following report(s) SBAR was reviewed with the receiving nurse. Lines:   Peripheral IV 04/10/18 Right Antecubital (Active)        Opportunity for questions and clarification was provided.       Patient transported with:

## 2018-04-10 NOTE — PROGRESS NOTES
made initial visit. Pt was alert and verbal.  Pt appeared comfortable with no pain level expressed or observed. Pt had family present at visit. Pt spoke of her lawrence and experiences with God.  welcomed pt and family to [address] [place] [address] and spoke of  services.  provided spiritual care through presence, pastoral conversation, prayer, and assurance of prayer.

## 2018-04-10 NOTE — PROGRESS NOTES
Dual Skin Assessment    Skin assessment completed with Bernadine Salas. No abnormalities noted.           Alek Goldberg    4/10/2018 7:52 PM

## 2018-04-10 NOTE — ED PROVIDER NOTES
HPI Comments: Patient is an 81-year-old female who suffered a recent left proximal humerus fracture and has been sleeping in a recliner since then due to comfort. Around 12 midnight she got up to get a blanket and she states she tripped and accidentally fell landing on her right hip. She had to awaken her son for help and had to lift her and put her back in the recliner to sleep. She had worsening pain in the right hip this morning and was unable to ambulate or stand on the leg. There was no head injury or loss of consciousness. Patient is a 81 y.o. female presenting with hip pain. The history is provided by the patient and a relative. Hip Injury    This is a new problem. The current episode started 6 to 12 hours ago. The problem occurs constantly. The problem has been gradually worsening. The pain is present in the right hip. Associated symptoms include limited range of motion. Pertinent negatives include no back pain and no neck pain. The symptoms are aggravated by movement and palpation. There has been a history of trauma.         Past Medical History:   Diagnosis Date    Anemia, unspecified     Calculus of kidney     Depressive disorder, not elsewhere classified     Diabetes ([address]     Hypertension     Hypocalcemia and hypomagnesemia of      Hypopotassemia     Hyposmolality and/or hyponatremia     Insomnia, unspecified     Irritable bowel syndrome     Other and unspecified hyperlipidemia 2015    Polyneuropathy in diabetes ([address]     Renal colic     Type II or unspecified type diabetes mellitus without mention of complication, not stated as uncontrolled     Unspecified essential hypertension        Past Surgical History:   Procedure Laterality Date    HX APPENDECTOMY      HX CHOLECYSTECTOMY      HX GYN      hysterectomy: Partial/ Ovaries remain    HX HEENT      HX TONSILLECTOMY      HX UROLOGICAL      Bladder Surgery         Family History:   Problem Relation Age of Onset  Hypertension Mother     Stroke Mother     Stroke Father     Hypertension Father        Social History     Social History    Marital status:      Spouse name: N/A    Number of children: N/A    Years of education: N/A     Occupational History    Not on file. Social History Main Topics    Smoking status: Never Smoker    Smokeless tobacco: Never Used    Alcohol use No    Drug use: No    Sexual activity: Not on file     Other Topics Concern    Not on file     Social History Narrative         ALLERGIES: Pcn [penicillins] and Sulfa (sulfonamide antibiotics)    Review of Systems   Constitutional: Negative. HENT: Negative. Eyes: Negative. Respiratory: Negative. Cardiovascular: Negative. Endocrine: Negative. Genitourinary: Negative. Musculoskeletal: Negative for back pain and neck pain. Skin: Negative. Neurological: Negative. Vitals:    04/10/18 0900 04/10/18 0925   BP: 180/81    Pulse: 76    Resp: 16    Temp: 98.1 °F (36.7 °C)    SpO2: 95% 95%   Weight: 64.9 kg (143 lb)    Height: 5' 6\" (1.676 m)             Physical Exam   Constitutional: She is oriented to person, place, and time. She appears well-developed and well-nourished. HENT:   Head: Normocephalic. Eyes: EOM are normal. Pupils are equal, round, and reactive to light. Neck: Normal range of motion. No cervical spine tenderness   Cardiovascular: Normal rate, regular rhythm and intact distal pulses. Pulmonary/Chest: Effort normal and breath sounds normal. No respiratory distress. She exhibits no tenderness. Abdominal: Soft. There is no tenderness. Musculoskeletal: She exhibits tenderness. Tender right hip and severe pain with range of motion of the right hip. Neurological: She is alert and oriented to person, place, and time. She has normal strength. No cranial nerve deficit or sensory deficit. GCS eye subscore is 4. GCS verbal subscore is 5. GCS motor subscore is 6. Skin: There is pallor. Nursing note and vitals reviewed. MDM  Number of Diagnoses or Management Options  Diagnosis management comments: ddifferential diagnosis includes fall, contusion, fracture, dislocation       Amount and/or Complexity of Data Reviewed  Clinical lab tests: ordered and reviewed  Tests in the radiology section of CPT®: ordered and reviewed  Review and summarize past medical records: yes  Independent visualization of images, tracings, or specimens: yes    Risk of Complications, Morbidity, and/or Mortality  Presenting problems: moderate  Diagnostic procedures: moderate  Management options: moderate    Patient Progress  Patient progress: stable        ED Course   9:55 AM  X-rays of the right hip show a right femoral neck dislocation. All preoperative labs are ordered and I will consult with the orthopedic fracture service and with the hospitalist for admission and further management. Voice dictation software was used during the making of this note. This software is not perfect and grammatical and other typographical errors may be present. This note has been proofread, but may still contain errors.   Parish Bonilla MD; 4/10/2018 @9:56 AM   ===================================================================        Procedures

## 2018-04-10 NOTE — H&P
History and Physical    Patient: Gerardo Henry MRN: [id number]  SSN: [id number]    YOB: 1935  Age: 81 y.o. Sex: female      Subjective:   Cc: \" I fell\"    Gerardo Henry is a 81 y.o. female who has a PMH of HTN, dyslipidemia, chronic anemia, kidney stones ( unable to take calcium supplements ), who came after she fell at home after she tripped. She hit her right hip and was unable to move after this. Of note, patient had a recent left humeral fracture 2 months ago, which was treated conservatively. Her son was present in the ER room and all questions were answered. Patient denied any dizziness, chest pain, sob, palpitations, dysuria, fever, chills. She was using a cane at home. ROS: all pertinent findings described in my note    Upon arrival VS /81  HR 76  RR 18  O2: 98% room air. Labs: chronic anemia; right hip x ray: right femoral neck dislocation. CXR: unremarkable. EKG: accelerated J. Rhythm. UA: WBC > 100  She received morphine 4 mg ivp. Hospitalist was contacted for admission in light of right hip dislocation. Plan for orthopedic procedure.     PMH: as above  Social hx: non smoker, no alcohol intake  Family hx: parents: cva and htn    Past Medical History:   Diagnosis Date    Anemia, unspecified     Calculus of kidney     Depressive disorder, not elsewhere classified     Diabetes ([address]     Hypertension     Hypocalcemia and hypomagnesemia of      Hypopotassemia     Hyposmolality and/or hyponatremia     Insomnia, unspecified     Irritable bowel syndrome     Other and unspecified hyperlipidemia 2015    Polyneuropathy in diabetes ([address]     Renal colic     Type II or unspecified type diabetes mellitus without mention of complication, not stated as uncontrolled     Unspecified essential hypertension      Past Surgical History:   Procedure Laterality Date    HX APPENDECTOMY      HX CHOLECYSTECTOMY      HX GYN      hysterectomy: Partial/ Ovaries remain    HX HEENT      HX TONSILLECTOMY      HX UROLOGICAL      Bladder Surgery      Family History   Problem Relation Age of Onset    Hypertension Mother     Stroke Mother    [address] [facility] Stroke Father     Hypertension Father      Social History   Substance Use Topics    Smoking status: Never Smoker    Smokeless tobacco: Never Used    Alcohol use No      Prior to Admission medications    Medication Sig Start Date End Date Taking? Authorizing Provider   verapamil (CALAN) 40 mg tablet TAKE 1 TABLET BY MOUTH TWICE A DAY 3/18/18   Yunior Wooten MD   fluticasone [facility]) 50 mcg/actuation nasal spray 2 Sprays by Both Nostrils route daily. 2/26/18   Yunior Wooten MD   cetirizine (ZYRTEC) 10 mg tablet Take 1 Tab by mouth nightly. Indications: Allergic Rhinitis 2/26/18   Yunior Wooten MD   [facility]) 250 mg tablet Take two tablets today then one tablet daily 2/26/18   Yunior Wooten MD   morphine IR (MS IR) 15 mg tablet Take 0.5 Tabs by mouth every six (6) hours as needed for Pain. Max Daily Amount: 30 mg. 1/2 to one tab every 6 hours as needed for pain 2/20/18   Reed Osler, MD   ondansetron (ZOFRAN ODT) 4 mg disintegrating tablet Take 1 Tab by mouth every eight (8) hours as needed for Nausea. 2/20/18   Reed Osler, MD   furosemide (LASIX) 20 mg tablet TAKE 1 TABLET BY MOUTH EVERY DAY 12/2/17   Yunior Wooten MD   clonazePAM (KLONOPIN) 1 mg tablet TAKE 1 TABLET BY MOUTH TWICE A DAY 10/25/17   Yunior Wooten MD   ACCU-CHEK OVIDIO PLUS TEST STRP strip TEST ONE TIME DAILY 7/6/17   Yunior Wooten MD   metFORMIN ER (GLUCOPHAGE XR) 500 mg tablet TAKE ONE TABLET BY MOUTH ONE TIME DAILY 6/8/17   Yunior Wooten MD   metoprolol succinate (TOPROL-XL) 200 mg XL tablet Take 1 Tab by mouth two (2) times a day. 6/8/17   Yunior Wooten MD   hydrALAZINE (APRESOLINE) 50 mg tablet Take 1 Tab by mouth three (3) times daily.  6/8/17   Yunior Wooten MD   omeprazole (PRILOSEC) 20 mg capsule TAKE ONE CAPSULE BY MOUTH EVERY DAY 6/5/17   Ratna Freedman MD   [address] TEST ONE TIME DAILY 5/1/17   Ratna Freedman MD   ACCU-CHEK SOFTCLIX LANCETS misc USE AS DIRECTED EVERY DAY 5/1/17   Ratna Freedman MD   benazepril (LOTENSIN) 40 mg tablet TAKE 1 TABLET BY MOUTH TWICE A DAY 4/12/17   JAY Reynolds   KRILL OIL PO Take  by mouth. Historical Provider   aspirin 81 mg chewable tablet Take 81 mg by mouth daily. Historical Provider        Allergies   Allergen Reactions    Pcn [Penicillins] Unknown (comments)    Sulfa (Sulfonamide Antibiotics) Unknown (comments)       Review of Systems:  A comprehensive review of systems was negative except for that written in the History of Present Illness. Objective:     Vitals:    04/10/18 0900 04/10/18 0925 04/10/18 1102   BP: 180/81  152/77   Pulse: 76  67   Resp: 16  16   Temp: 98.1 °F (36.7 °C)     SpO2: 95% 95% 96%   Weight: 64.9 kg (143 lb)     Height: 5' 6\" (1.676 m)          Physical Exam:  GENERAL: alert, cooperative, no distress, appears stated age  EYE: conjunctivae/corneas clear. PERRL, EOM's intact. Fundi benign  LYMPHATIC: Cervical, supraclavicular, and axillary nodes normal.   THROAT & NECK: normal and no erythema or exudates noted. LUNG: clear to auscultation bilaterally  HEART: regular rate and rhythm, S1, S2 normal, no murmur, click, rub or gallop  ABDOMEN: soft, non-tender. Bowel sounds normal. No masses,  no organomegaly  EXTREMITIES:  Left shoulder: no pain, she was able to move it. ROM preserved. Right hip: tender to touch, unable to perform rotation.   no cyanosis or edema  SKIN: Normal.  NEUROLOGIC: negative  PSYCHIATRIC: non focal  Villareal cath: noted with thick greenish sediment    Assessment:     Hospital Problems  Date Reviewed: 4/10/2018          Codes Class Noted POA    * (Principal)Hip dislocation, right ([address] ICD-10-CM: S73.004A  ICD-9-CM: 835.00  4/10/2018 Yes Mixed hyperlipidemia ICD-10-CM: E78.2  ICD-9-CM: 272.2  9/15/2017 Yes        Type 2 diabetes mellitus with diabetic polyneuropathy, without long-term current use of insulin (HCC) ICD-10-CM: E11.42  ICD-9-CM: 250.60, 357.2  9/15/2017 Yes        Essential hypertension ICD-10-CM: I10  ICD-9-CM: 401.9  Unknown Yes        Anemia ICD-10-CM: D64.9  ICD-9-CM: 285. 9  Unknown Yes              Plan: 1. Right femoral neck dislocation, after a mechanical fall  2. HTN, out of control, multifactorial: likely due to pain, since she took her home meds this morning  3. Type 2 DM  4. Recent left humeral fracture  5. UTI    Plan:  Admit as inpatient  Keep NPO after midnight   IVF for hydration  Pain control as per orthopedics protocol  For now, may give hydralazine prn for HTN  Start rocephin after positive UA, check Urine culture   Hold verapamil, benazepril and toprol  Hold metformin for now, and start humalog sliding scale instead   DVT prophylaxis: GCS- Orthopedics protocol  Labs in am  PT/OT   for placement  PPD     Full code    Estimated LOS > 2MN  Risk: high  Estimated DC planning: STR  Goals of care discussed with patient and family member    Pre-operative clearance:  Type of procedure: semi-urgent  Risk: low  Cardiac risk index: 0    Suggestion:    She may have hydralazine prior surgery  Hold rest of HTN meds since she took them in the morning  DVT ppx according to orthopedics  Keep O2 sat > 90%      Signed By: Viola Duggan MD     April 10, 2018

## 2018-04-11 ENCOUNTER — APPOINTMENT (OUTPATIENT)
Dept: GENERAL RADIOLOGY | Age: 83
DRG: 481 | End: 2018-04-11
Attending: ORTHOPAEDIC SURGERY
Payer: MEDICARE

## 2018-04-11 ENCOUNTER — ANESTHESIA (OUTPATIENT)
Dept: SURGERY | Age: 83
DRG: 481 | End: 2018-04-11
Payer: MEDICARE

## 2018-04-11 LAB
25(OH)D3+25(OH)D2 SERPL-MCNC: 34.4 NG/ML (ref 30–100)
GLUCOSE BLD STRIP.AUTO-MCNC: 118 MG/DL (ref 65–100)
GLUCOSE BLD STRIP.AUTO-MCNC: 118 MG/DL (ref 65–100)
GLUCOSE BLD STRIP.AUTO-MCNC: 167 MG/DL (ref 65–100)
GLUCOSE BLD STRIP.AUTO-MCNC: 171 MG/DL (ref 65–100)
GLUCOSE BLD STRIP.AUTO-MCNC: 95 MG/DL (ref 65–100)
GLUCOSE BLD STRIP.AUTO-MCNC: 99 MG/DL (ref 65–100)
MM INDURATION POC: 0 MM (ref 0–5)
PPD POC: NEGATIVE NEGATIVE

## 2018-04-11 PROCEDURE — 77010033678 HC OXYGEN DAILY

## 2018-04-11 PROCEDURE — 74011250636 HC RX REV CODE- 250/636: Performed by: INTERNAL MEDICINE

## 2018-04-11 PROCEDURE — C1769 GUIDE WIRE: HCPCS | Performed by: ORTHOPAEDIC SURGERY

## 2018-04-11 PROCEDURE — 74011250636 HC RX REV CODE- 250/636: Performed by: NURSE PRACTITIONER

## 2018-04-11 PROCEDURE — 76010000160 HC OR TIME 0.5 TO 1 HR INTENSV-TIER 1: Performed by: ORTHOPAEDIC SURGERY

## 2018-04-11 PROCEDURE — 77030020782 HC GWN BAIR PAWS FLX 3M -B: Performed by: ANESTHESIOLOGY

## 2018-04-11 PROCEDURE — 97161 PT EVAL LOW COMPLEX 20 MIN: CPT

## 2018-04-11 PROCEDURE — C1713 ANCHOR/SCREW BN/BN,TIS/BN: HCPCS | Performed by: ORTHOPAEDIC SURGERY

## 2018-04-11 PROCEDURE — 74011250637 HC RX REV CODE- 250/637: Performed by: INTERNAL MEDICINE

## 2018-04-11 PROCEDURE — 77030008467 HC STPLR SKN COVD -B: Performed by: ORTHOPAEDIC SURGERY

## 2018-04-11 PROCEDURE — 74011250636 HC RX REV CODE- 250/636

## 2018-04-11 PROCEDURE — 74011250637 HC RX REV CODE- 250/637: Performed by: NURSE PRACTITIONER

## 2018-04-11 PROCEDURE — 77030011640 HC PAD GRND REM COVD -A: Performed by: ORTHOPAEDIC SURGERY

## 2018-04-11 PROCEDURE — 73501 X-RAY EXAM HIP UNI 1 VIEW: CPT

## 2018-04-11 PROCEDURE — 74011250637 HC RX REV CODE- 250/637: Performed by: ANESTHESIOLOGY

## 2018-04-11 PROCEDURE — 76210000006 HC OR PH I REC 0.5 TO 1 HR: Performed by: ORTHOPAEDIC SURGERY

## 2018-04-11 PROCEDURE — 65270000029 HC RM PRIVATE

## 2018-04-11 PROCEDURE — 74011000258 HC RX REV CODE- 258: Performed by: INTERNAL MEDICINE

## 2018-04-11 PROCEDURE — 82962 GLUCOSE BLOOD TEST: CPT

## 2018-04-11 PROCEDURE — 77030020143 HC AIRWY LARYN INTUB CGAS -A: Performed by: ANESTHESIOLOGY

## 2018-04-11 PROCEDURE — 74011250636 HC RX REV CODE- 250/636: Performed by: ORTHOPAEDIC SURGERY

## 2018-04-11 PROCEDURE — 77030018836 HC SOL IRR NACL ICUM -A: Performed by: ORTHOPAEDIC SURGERY

## 2018-04-11 PROCEDURE — 94760 N-INVAS EAR/PLS OXIMETRY 1: CPT

## 2018-04-11 PROCEDURE — 74011250636 HC RX REV CODE- 250/636: Performed by: ANESTHESIOLOGY

## 2018-04-11 PROCEDURE — 0QH634Z INSERTION OF INTERNAL FIXATION DEVICE INTO RIGHT UPPER FEMUR, PERCUTANEOUS APPROACH: ICD-10-PCS | Performed by: ORTHOPAEDIC SURGERY

## 2018-04-11 PROCEDURE — 74011636637 HC RX REV CODE- 636/637: Performed by: INTERNAL MEDICINE

## 2018-04-11 PROCEDURE — 74011000250 HC RX REV CODE- 250

## 2018-04-11 PROCEDURE — 97165 OT EVAL LOW COMPLEX 30 MIN: CPT

## 2018-04-11 PROCEDURE — 76060000032 HC ANESTHESIA 0.5 TO 1 HR: Performed by: ORTHOPAEDIC SURGERY

## 2018-04-11 DEVICE — IMPLANTABLE DEVICE: Type: IMPLANTABLE DEVICE | Site: FEMUR | Status: FUNCTIONAL

## 2018-04-11 RX ORDER — ONDANSETRON 2 MG/ML
4 INJECTION INTRAMUSCULAR; INTRAVENOUS
Status: DISCONTINUED | OUTPATIENT
Start: 2018-04-11 | End: 2018-04-13 | Stop reason: HOSPADM

## 2018-04-11 RX ORDER — SODIUM CHLORIDE, SODIUM LACTATE, POTASSIUM CHLORIDE, CALCIUM CHLORIDE 600; 310; 30; 20 MG/100ML; MG/100ML; MG/100ML; MG/100ML
100 INJECTION, SOLUTION INTRAVENOUS CONTINUOUS
Status: DISCONTINUED | OUTPATIENT
Start: 2018-04-11 | End: 2018-04-11 | Stop reason: HOSPADM

## 2018-04-11 RX ORDER — OXYCODONE HYDROCHLORIDE 5 MG/1
5 TABLET ORAL
Status: DISCONTINUED | OUTPATIENT
Start: 2018-04-11 | End: 2018-04-13 | Stop reason: HOSPADM

## 2018-04-11 RX ORDER — METOPROLOL SUCCINATE 50 MG/1
100 TABLET, EXTENDED RELEASE ORAL ONCE
Status: COMPLETED | OUTPATIENT
Start: 2018-04-11 | End: 2018-04-11

## 2018-04-11 RX ORDER — HYDRALAZINE HYDROCHLORIDE 20 MG/ML
10 INJECTION INTRAMUSCULAR; INTRAVENOUS
Status: DISCONTINUED | OUTPATIENT
Start: 2018-04-11 | End: 2018-04-13 | Stop reason: HOSPADM

## 2018-04-11 RX ORDER — HYDRALAZINE HYDROCHLORIDE 20 MG/ML
10 INJECTION INTRAMUSCULAR; INTRAVENOUS
Status: DISCONTINUED | OUTPATIENT
Start: 2018-04-11 | End: 2018-04-11

## 2018-04-11 RX ORDER — EPHEDRINE SULFATE 50 MG/ML
INJECTION, SOLUTION INTRAVENOUS AS NEEDED
Status: DISCONTINUED | OUTPATIENT
Start: 2018-04-11 | End: 2018-04-11 | Stop reason: HOSPADM

## 2018-04-11 RX ORDER — FERROUS SULFATE, DRIED 160(50) MG
1 TABLET, EXTENDED RELEASE ORAL
Status: DISCONTINUED | OUTPATIENT
Start: 2018-04-11 | End: 2018-04-11 | Stop reason: SDUPTHER

## 2018-04-11 RX ORDER — MIDAZOLAM HYDROCHLORIDE 1 MG/ML
2 INJECTION, SOLUTION INTRAMUSCULAR; INTRAVENOUS ONCE
Status: DISCONTINUED | OUTPATIENT
Start: 2018-04-11 | End: 2018-04-11 | Stop reason: HOSPADM

## 2018-04-11 RX ORDER — MAG HYDROX/ALUMINUM HYD/SIMETH 200-200-20
30 SUSPENSION, ORAL (FINAL DOSE FORM) ORAL
Status: DISCONTINUED | OUTPATIENT
Start: 2018-04-11 | End: 2018-04-13 | Stop reason: HOSPADM

## 2018-04-11 RX ORDER — FENTANYL CITRATE 50 UG/ML
INJECTION, SOLUTION INTRAMUSCULAR; INTRAVENOUS AS NEEDED
Status: DISCONTINUED | OUTPATIENT
Start: 2018-04-11 | End: 2018-04-11 | Stop reason: HOSPADM

## 2018-04-11 RX ORDER — SODIUM CHLORIDE 0.9 % (FLUSH) 0.9 %
5-10 SYRINGE (ML) INJECTION EVERY 8 HOURS
Status: DISCONTINUED | OUTPATIENT
Start: 2018-04-11 | End: 2018-04-13 | Stop reason: HOSPADM

## 2018-04-11 RX ORDER — DEXAMETHASONE SODIUM PHOSPHATE 4 MG/ML
INJECTION, SOLUTION INTRA-ARTICULAR; INTRALESIONAL; INTRAMUSCULAR; INTRAVENOUS; SOFT TISSUE AS NEEDED
Status: DISCONTINUED | OUTPATIENT
Start: 2018-04-11 | End: 2018-04-11 | Stop reason: HOSPADM

## 2018-04-11 RX ORDER — LIDOCAINE HYDROCHLORIDE 20 MG/ML
INJECTION, SOLUTION EPIDURAL; INFILTRATION; INTRACAUDAL; PERINEURAL AS NEEDED
Status: DISCONTINUED | OUTPATIENT
Start: 2018-04-11 | End: 2018-04-11 | Stop reason: HOSPADM

## 2018-04-11 RX ORDER — VERAPAMIL HYDROCHLORIDE 80 MG/1
40 TABLET ORAL 2 TIMES DAILY
Status: DISCONTINUED | OUTPATIENT
Start: 2018-04-11 | End: 2018-04-13 | Stop reason: HOSPADM

## 2018-04-11 RX ORDER — MIDAZOLAM HYDROCHLORIDE 1 MG/ML
2 INJECTION, SOLUTION INTRAMUSCULAR; INTRAVENOUS
Status: DISCONTINUED | OUTPATIENT
Start: 2018-04-11 | End: 2018-04-11 | Stop reason: HOSPADM

## 2018-04-11 RX ORDER — DOCUSATE SODIUM 100 MG/1
100 CAPSULE, LIQUID FILLED ORAL 2 TIMES DAILY
Status: DISCONTINUED | OUTPATIENT
Start: 2018-04-11 | End: 2018-04-13 | Stop reason: HOSPADM

## 2018-04-11 RX ORDER — HYDRALAZINE HYDROCHLORIDE 50 MG/1
50 TABLET, FILM COATED ORAL 3 TIMES DAILY
Status: DISCONTINUED | OUTPATIENT
Start: 2018-04-11 | End: 2018-04-13 | Stop reason: HOSPADM

## 2018-04-11 RX ORDER — HYDROMORPHONE HYDROCHLORIDE 2 MG/ML
0.2 INJECTION, SOLUTION INTRAMUSCULAR; INTRAVENOUS; SUBCUTANEOUS
Status: DISCONTINUED | OUTPATIENT
Start: 2018-04-11 | End: 2018-04-11 | Stop reason: HOSPADM

## 2018-04-11 RX ORDER — LIDOCAINE HYDROCHLORIDE 10 MG/ML
0.1 INJECTION INFILTRATION; PERINEURAL AS NEEDED
Status: DISCONTINUED | OUTPATIENT
Start: 2018-04-11 | End: 2018-04-11 | Stop reason: HOSPADM

## 2018-04-11 RX ORDER — ACETAMINOPHEN 325 MG/1
650 TABLET ORAL EVERY 8 HOURS
Status: DISCONTINUED | OUTPATIENT
Start: 2018-04-11 | End: 2018-04-11 | Stop reason: SDUPTHER

## 2018-04-11 RX ORDER — SODIUM CHLORIDE 0.9 % (FLUSH) 0.9 %
5-10 SYRINGE (ML) INJECTION AS NEEDED
Status: DISCONTINUED | OUTPATIENT
Start: 2018-04-11 | End: 2018-04-13 | Stop reason: HOSPADM

## 2018-04-11 RX ORDER — ENOXAPARIN SODIUM 100 MG/ML
30 INJECTION SUBCUTANEOUS EVERY 24 HOURS
Status: DISCONTINUED | OUTPATIENT
Start: 2018-04-12 | End: 2018-04-13 | Stop reason: HOSPADM

## 2018-04-11 RX ORDER — ONDANSETRON 2 MG/ML
INJECTION INTRAMUSCULAR; INTRAVENOUS AS NEEDED
Status: DISCONTINUED | OUTPATIENT
Start: 2018-04-11 | End: 2018-04-11 | Stop reason: HOSPADM

## 2018-04-11 RX ORDER — OXYCODONE HYDROCHLORIDE 5 MG/1
5 TABLET ORAL
Status: DISCONTINUED | OUTPATIENT
Start: 2018-04-11 | End: 2018-04-11 | Stop reason: HOSPADM

## 2018-04-11 RX ORDER — METOPROLOL SUCCINATE 100 MG/1
200 TABLET, EXTENDED RELEASE ORAL 2 TIMES DAILY
Status: DISCONTINUED | OUTPATIENT
Start: 2018-04-11 | End: 2018-04-13 | Stop reason: HOSPADM

## 2018-04-11 RX ORDER — BENAZEPRIL HYDROCHLORIDE 10 MG/1
40 TABLET ORAL DAILY
Status: DISCONTINUED | OUTPATIENT
Start: 2018-04-11 | End: 2018-04-13 | Stop reason: HOSPADM

## 2018-04-11 RX ORDER — NALOXONE HYDROCHLORIDE 0.4 MG/ML
0.04 INJECTION, SOLUTION INTRAMUSCULAR; INTRAVENOUS; SUBCUTANEOUS
Status: DISCONTINUED | OUTPATIENT
Start: 2018-04-11 | End: 2018-04-11 | Stop reason: HOSPADM

## 2018-04-11 RX ORDER — PROPOFOL 10 MG/ML
INJECTION, EMULSION INTRAVENOUS AS NEEDED
Status: DISCONTINUED | OUTPATIENT
Start: 2018-04-11 | End: 2018-04-11 | Stop reason: HOSPADM

## 2018-04-11 RX ORDER — SODIUM CHLORIDE, SODIUM LACTATE, POTASSIUM CHLORIDE, CALCIUM CHLORIDE 600; 310; 30; 20 MG/100ML; MG/100ML; MG/100ML; MG/100ML
75 INJECTION, SOLUTION INTRAVENOUS CONTINUOUS
Status: DISCONTINUED | OUTPATIENT
Start: 2018-04-11 | End: 2018-04-12

## 2018-04-11 RX ORDER — FENTANYL CITRATE 50 UG/ML
100 INJECTION, SOLUTION INTRAMUSCULAR; INTRAVENOUS ONCE
Status: DISCONTINUED | OUTPATIENT
Start: 2018-04-11 | End: 2018-04-11 | Stop reason: HOSPADM

## 2018-04-11 RX ADMIN — TRAMADOL HYDROCHLORIDE 50 MG: 50 TABLET, FILM COATED ORAL at 05:18

## 2018-04-11 RX ADMIN — TRAMADOL HYDROCHLORIDE 50 MG: 50 TABLET, FILM COATED ORAL at 10:38

## 2018-04-11 RX ADMIN — BENAZEPRIL HYDROCHLORIDE 40 MG: 10 TABLET, FILM COATED ORAL at 12:44

## 2018-04-11 RX ADMIN — HYDRALAZINE HYDROCHLORIDE 50 MG: 50 TABLET, FILM COATED ORAL at 14:22

## 2018-04-11 RX ADMIN — VERAPAMIL HYDROCHLORIDE 40 MG: 80 TABLET, FILM COATED ORAL at 17:07

## 2018-04-11 RX ADMIN — ACETAMINOPHEN 650 MG: 325 TABLET ORAL at 14:22

## 2018-04-11 RX ADMIN — SODIUM CHLORIDE, SODIUM LACTATE, POTASSIUM CHLORIDE, AND CALCIUM CHLORIDE: 600; 310; 30; 20 INJECTION, SOLUTION INTRAVENOUS at 07:45

## 2018-04-11 RX ADMIN — CEFTRIAXONE SODIUM 1 G: 1 INJECTION, POWDER, FOR SOLUTION INTRAMUSCULAR; INTRAVENOUS at 12:44

## 2018-04-11 RX ADMIN — FENTANYL CITRATE 50 MCG: 50 INJECTION, SOLUTION INTRAMUSCULAR; INTRAVENOUS at 08:11

## 2018-04-11 RX ADMIN — DEXAMETHASONE SODIUM PHOSPHATE 4 MG: 4 INJECTION, SOLUTION INTRA-ARTICULAR; INTRALESIONAL; INTRAMUSCULAR; INTRAVENOUS; SOFT TISSUE at 08:00

## 2018-04-11 RX ADMIN — HYDRALAZINE HYDROCHLORIDE 10 MG: 20 INJECTION INTRAMUSCULAR; INTRAVENOUS at 05:20

## 2018-04-11 RX ADMIN — INSULIN LISPRO 2 UNITS: 100 INJECTION, SOLUTION INTRAVENOUS; SUBCUTANEOUS at 17:06

## 2018-04-11 RX ADMIN — METOPROLOL SUCCINATE 100 MG: 100 TABLET, EXTENDED RELEASE ORAL at 10:38

## 2018-04-11 RX ADMIN — DOCUSATE SODIUM 100 MG: 100 CAPSULE, LIQUID FILLED ORAL at 17:06

## 2018-04-11 RX ADMIN — ACETAMINOPHEN 650 MG: 325 TABLET ORAL at 21:07

## 2018-04-11 RX ADMIN — HYDRALAZINE HYDROCHLORIDE 50 MG: 50 TABLET, FILM COATED ORAL at 21:07

## 2018-04-11 RX ADMIN — OXYCODONE HYDROCHLORIDE 5 MG: 5 TABLET ORAL at 09:06

## 2018-04-11 RX ADMIN — VANCOMYCIN HYDROCHLORIDE 1000 MG: 1 INJECTION, POWDER, LYOPHILIZED, FOR SOLUTION INTRAVENOUS at 06:05

## 2018-04-11 RX ADMIN — EPHEDRINE SULFATE 10 MG: 50 INJECTION, SOLUTION INTRAVENOUS at 08:05

## 2018-04-11 RX ADMIN — VANCOMYCIN HYDROCHLORIDE 1000 MG: 1 INJECTION, POWDER, LYOPHILIZED, FOR SOLUTION INTRAVENOUS at 19:03

## 2018-04-11 RX ADMIN — HYDRALAZINE HYDROCHLORIDE 10 MG: 20 INJECTION INTRAMUSCULAR; INTRAVENOUS at 10:38

## 2018-04-11 RX ADMIN — Medication 5 ML: at 10:40

## 2018-04-11 RX ADMIN — SODIUM CHLORIDE, SODIUM LACTATE, POTASSIUM CHLORIDE, AND CALCIUM CHLORIDE 75 ML/HR: 600; 310; 30; 20 INJECTION, SOLUTION INTRAVENOUS at 06:05

## 2018-04-11 RX ADMIN — MUPIROCIN: 2 CREAM TOPICAL at 05:20

## 2018-04-11 RX ADMIN — SODIUM CHLORIDE, SODIUM LACTATE, POTASSIUM CHLORIDE, AND CALCIUM CHLORIDE 75 ML/HR: 600; 310; 30; 20 INJECTION, SOLUTION INTRAVENOUS at 10:41

## 2018-04-11 RX ADMIN — MUPIROCIN: 2 CREAM TOPICAL at 17:08

## 2018-04-11 RX ADMIN — METOPROLOL SUCCINATE 200 MG: 100 TABLET, EXTENDED RELEASE ORAL at 21:07

## 2018-04-11 RX ADMIN — ACETAMINOPHEN 650 MG: 325 TABLET ORAL at 05:18

## 2018-04-11 RX ADMIN — METOPROLOL SUCCINATE 100 MG: 50 TABLET, EXTENDED RELEASE ORAL at 06:50

## 2018-04-11 RX ADMIN — ONDANSETRON 4 MG: 2 INJECTION INTRAMUSCULAR; INTRAVENOUS at 08:20

## 2018-04-11 RX ADMIN — PROPOFOL 150 MG: 10 INJECTION, EMULSION INTRAVENOUS at 07:52

## 2018-04-11 RX ADMIN — Medication 10 ML: at 21:07

## 2018-04-11 RX ADMIN — Medication 2 G: at 08:00

## 2018-04-11 RX ADMIN — DOCUSATE SODIUM 100 MG: 100 CAPSULE, LIQUID FILLED ORAL at 10:38

## 2018-04-11 RX ADMIN — LIDOCAINE HYDROCHLORIDE 60 MG: 20 INJECTION, SOLUTION EPIDURAL; INFILTRATION; INTRACAUDAL; PERINEURAL at 07:52

## 2018-04-11 NOTE — PROGRESS NOTES
Referrals [facility] to [facility] and [facility] per patient and family request.

## 2018-04-11 NOTE — PROGRESS NOTES
Spoke with patient regarding d/c planning. Alert and oriented x 4  Lives with her youngest son Dheeraj Mata in a double wide trailer with back ramp entrance. Prior to admission able to perform all of her own ADL's and drive. States her son does most of the heavy cleaning and cooking. PCP- Dr. Katy Prakash and next appointment 4-25-18. DME- cane  [facility] or problems filling Rx. PT/OT evals pending. List of [facility] approved STR given to patient to review with son. CM will continue to follow for any further needs. Care Management Interventions  PCP Verified by CM:  Yes  Mode of Transport at Discharge: BLS  Transition of Care Consult (CM Consult): Discharge Planning  Physical Therapy Consult: Yes  Occupational Therapy Consult: Yes  Current Support Network: Relative's Home  Confirm Follow Up Transport: Family  Plan discussed with Pt/Family/Caregiver: Yes  Freedom of Choice Offered: Yes  Discharge Location  Discharge Placement: Rehab Unit Subacute

## 2018-04-11 NOTE — PERIOP NOTES
TRANSFER - IN REPORT:    Verbal report received from Byron Crockett on Katie Edelson  being received from room 737 for routine progression of care      Report consisted of patients Situation, Background, Assessment and   Recommendations(SBAR). Information from the following report(s) SBAR, Kardex and MAR was reviewed with the receiving nurse. Opportunity for questions and clarification was provided. Assessment completed upon patients arrival to unit and care assumed.

## 2018-04-11 NOTE — PROGRESS NOTES
Problem: Falls - Risk of  Goal: *Absence of Falls  Document Bharati Fall Risk and appropriate interventions in the flowsheet.    Outcome: Progressing Towards Goal  Fall Risk Interventions:  Mobility Interventions: Bed/chair exit alarm, Communicate number of staff needed for ambulation/transfer, OT consult for ADLs, Patient to call before getting OOB, PT Consult for assist device competence, PT Consult for mobility concerns, Utilize walker, cane, or other assitive device         Medication Interventions: Bed/chair exit alarm, Evaluate medications/consider consulting pharmacy, Patient to call before getting OOB, Teach patient to arise slowly    Elimination Interventions: Bed/chair exit alarm, Call light in reach, Patient to call for help with toileting needs, Toileting schedule/hourly rounds    History of Falls Interventions: Bed/chair exit alarm, Consult care management for discharge planning, Door open when patient unattended, Evaluate medications/consider consulting pharmacy, Room close to nurse's station, Investigate reason for fall

## 2018-04-11 NOTE — PROGRESS NOTES
Progress Note    Patient: Mary Cano MRN: [id number]  SSN: [id number]    YOB: 1935  Age: 81 y.o. Sex: female      Admit Date: 4/10/2018    LOS: 1 day     Subjective:   Cc: \" I have no pain\"    Patient examined at bedside. She had no complains today. She stated feeling pain free. She underwent percutaneous pinning of the right hip with no complications.  on board. Plan for STR once approved. ROS: all pertinent findings described in my note. Objective:     Vitals:    04/10/18 2320 04/11/18 0516 04/11/18 0600 04/11/18 0601   BP: 153/68 189/67     Pulse: 62 75 76    Resp: 20 20 18    Temp: 97.9 °F (36.6 °C) 98.6 °F (37 °C) 98.7 °F (37.1 °C)    SpO2: 91% 95% (!) 87% 95%   Weight:       Height:            Intake and Output:  Current Shift:    Last three shifts: 04/09 1901 - 04/11 0700  In: -   Out: 2200 [Urine:2200]    Physical Exam:   GENERAL: alert, cooperative, no distress, appears stated age  EYE: negative  LYMPHATIC: Cervical, supraclavicular, and axillary nodes normal.   THROAT & NECK: normal and no erythema or exudates noted. LUNG: clear to auscultation bilaterally  HEART: regular rate and rhythm, S1, S2 normal, no murmur, click, rub or gallop  ABDOMEN: soft, non-tender. Bowel sounds normal. No masses,  no organomegaly  EXTREMITIES:  Right hip: sterile gauze in place- boot- left: extremities normal, atraumatic, no cyanosis or edema  SKIN: Normal.  NEUROLOGIC: negative  PSYCHIATRIC: non focal  Vascular: pulses present     Lab/Data Review: All lab results for the last 24 hours reviewed. Assessment:     Principal Problem:    Hip dislocation, right ([address] (4/10/2018)    Active Problems:    Essential hypertension ()      Anemia ()      Mixed hyperlipidemia (9/15/2017)      Type 2 diabetes mellitus with diabetic polyneuropathy, without long-term current use of insulin ([address] (9/15/2017)      UTI (urinary tract infection) (4/10/2018)        Plan: 1. Right femoral neck dislocation, after a mechanical fall: s/p percutaneous pinning of the right hip- continue pain control- DVT ppx- PT/OT    2. HTN, much controled now: continue home meds    3. Type 2 DM: on humalog sliding scale    4. Recent left humeral fracture: monitor    5. UTI: on rocephin- F/U urine culture       for placement     Full code    Disposition: awaiting pre-certification for STR    Signed By: Venecia Phillips MD     April 11, 2018

## 2018-04-11 NOTE — PROGRESS NOTES
TRANSFER - OUT REPORT:    Verbal report given to Lauree Moritz, RN(name) on Juan Daniel Bermeo  being transferred to PREOP(unit) for ordered procedure       Report consisted of patients Situation, Background, Assessment and   Recommendations(SBAR). Information from the following report(s) SBAR, Kardex, Intake/Output and MAR was reviewed with the receiving nurse. Lines:   Peripheral IV 04/10/18 Right Antecubital (Active)   Site Assessment Clean, dry, & intact 4/10/2018  8:05 PM   Phlebitis Assessment 0 4/10/2018  8:05 PM   Infiltration Assessment 0 4/10/2018  8:05 PM   Dressing Status Clean, dry, & intact 4/10/2018  8:05 PM   Dressing Type Tape;Transparent 4/10/2018  8:05 PM   Hub Color/Line Status Infusing 4/10/2018  8:05 PM        Opportunity for questions and clarification was provided.       Patient transported with:   Registered Nurse

## 2018-04-11 NOTE — PROGRESS NOTES
TRANSFER - IN REPORT:    Verbal report received from Haresh Maurice RN(name) on Greg Aiken  being received from PACU(unit) for routine post - op      Report consisted of patients Situation, Background, Assessment and   Recommendations(SBAR). Information from the following report(s) SBAR, Kardex, Intake/Output and MAR was reviewed with the receiving nurse. Opportunity for questions and clarification was provided. Assessment completed upon patients arrival to unit and care assumed.

## 2018-04-11 NOTE — CONSULTS
Geriatric Fracture Consult  Patient: Deandra Love  YOB: 1935   MRN: [id number]      Consult Date: 2018     Consulting Physician: DR Faustino Lopez    Chief Complaint: RIGHT FEMORAL NECK FRACTURE; OSTEOPOROSIS  History of Present Illness: Magui Lopez is an 81 y.o.  male who is being seen for right hip pain after sustaining a fall at home. Onset of symptoms was abrupt with unchanged course since that time. The pain is located in the right hip. Patient describes the pain as continuous and rated as moderate. Pain has been associated with movement. Patient denies other injuries. Review of Systems: A comprehensive review of systems was negative except for that written in the History of Present Illness. 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 ([facility] [address]     Renal colic     Type II or unspecified type diabetes mellitus without mention of complication, not stated as uncontrolled     Unspecified essential hypertension        Allergies:    Allergies   Allergen Reactions    Pcn [Penicillins] Unknown (comments)    Sulfa (Sulfonamide Antibiotics) Unknown (comments)      Past Surgical History:   Past Surgical History:   Procedure Laterality Date    HX APPENDECTOMY      HX CHOLECYSTECTOMY      HX GYN      hysterectomy: Partial/ Ovaries remain    HX HEENT      HX TONSILLECTOMY      HX UROLOGICAL      Bladder Surgery      Social History:   Social History     Social History    Marital status:      Spouse name: N/A    Number of children: N/A    Years of education: N/A     Occupational History    Not on file. Social History Main Topics    Smoking status: Never Smoker    Smokeless tobacco: Never Used    Alcohol use No    Drug use: No    Sexual activity: Not on file     Other Topics Concern    Not on file     Social History Narrative      FAMILY HISTORY - REVIEWED - NO PERTINENT FAMILY HISTORY  Dwelling Status: LIVES WITH SONMIKEL  Current Anticoagulant Medications: Aspirin 81 MG/DAY  History of falls: YES  Prior Fractures: LEFT PROXIMAL HUMERUS - FEB 2018 -  [facility]  Osteoporosis Medications: none  Bone Density Tests: YES - OCT 2017  X-RAYS: Right Nondisplaced Femoral Neck Fracture  Physical Exam:   PATIENT COMPLAINING OF RIGHT HIP PAIN AFTER A FALL AT HOME. FOCUSED MUSCULOSKELETAL EXAM REVEALS DECREASED ROM TO RIGHT LE. THERE IS SHORTENING AND EXTERNAL ROTATION NOTED TO RIGHT LE. PATIENT IS TENDER TO PALPATION OVER RIGHT HIP AND GROIN. PATIENT HAS PAIN WITH LOG ROLLING. N/V INTACT. DENIES OTHER INJURIES.     Assessment / Plan: PERCUTANEOUS PINNING OF RIGHT HIP; CONSULT PT/OT - WBAT RIGHT LE; STR  RISKS AND BENEFITS WERE ADDRESSED WITH PATIENT AND SON  Labs:    Lab Results   Component Value Date/Time    HGB 11.0 (L) 04/10/2018 10:50 AM    WBC 9.8 04/10/2018 10:50 AM    INR 1.1 04/10/2018 10:50 AM    Albumin 3.3 02/20/2018 07:03 AM      Preoperative Clearance: YES by Hospitalist          Signed by: Sixto Madison NP   Today's Date: April 11, 2018

## 2018-04-11 NOTE — PROGRESS NOTES
Problem: Interdisciplinary Rounds  Goal: Interdisciplinary Rounds  Outcome: Progressing Towards Goal  Interdisciplinary team rounds were held 4/11/2018 with the following team members:Care Management, Occupational Therapy, Physician and  and the patient. Patient had surgery today; anticipate discharge to rehab on Friday or Saturday pending insurance approval.    Plan of care discussed. See clinical pathway and/or care plan for interventions and desired outcomes.

## 2018-04-11 NOTE — PROGRESS NOTES
Problem: Mobility Impaired (Adult and Pediatric)  Goal: *Acute Goals and Plan of Care (Insert Text)  STG:  (1.)Ms. Peter Stevenson will move from supine to sit and sit to supine , scoot up and down and roll side to side with MODIFIED INDEPENDENCE within 3 treatment day(s). (2.)Ms. Peter Stevenson will transfer from bed to chair and chair to bed with MINIMAL ASSIST using the least restrictive device within 3 treatment day(s). (3.)Ms. Peter Stevenson will ambulate with MINIMAL ASSIST for 100 feet with the least restrictive device within 3 treatment day(s). (4.)Ms. Peter Stevenson will perform LE exercises with 1 to 2 cues for form within 3 days to improve strength for functional transfers and ambulation. LTG:  (1.)Ms. Peter Stevenson will move from supine to sit and sit to supine , scoot up and down and roll side to side in bed with INDEPENDENT within 7 treatment day(s). (2.)Ms. Peter Stevenson will transfer from bed to chair and chair to bed with CONTACT GUARD ASSIST using the least restrictive device within 7 treatment day(s). (3.)Ms. Peter Stevenson will ambulate with CONTACT GUARD ASSIST for 100 feet with the least restrictive device within 7 treatment day(s).   ________________________________________________________________________________________________       PHYSICAL THERAPY: Initial Assessment, PM 4/11/2018  INPATIENT: Hospital Day: 2  Payor: Nunu Pap / Plan: [address] [place] [address] HMO / Product Type: Managed Care Medicare /      Gerard BLANTON and CÉSAR HAILE     NAME/AGE/GENDER: Gerardo Henry is a 81 y.o. female   PRIMARY DIAGNOSIS: Closed fracture of right hip, initial encounter ([facility] [address] [S72.001A] Hip dislocation, right ([facility] [address] Hip dislocation, right ([facility] [address]  Procedure(s) (LRB):  HIP PERCUTANEOUS PINNING RIGHT/ 737/ CHOICE (Right)  Day of Surgery  ICD-10: Treatment Diagnosis:    · Generalized Muscle Weakness (M62.81)  · Other abnormalities of gait and mobility (R26.89)   Precaution/Allergies:  Pcn [penicillins] and Sulfa (sulfonamide antibiotics)      ASSESSMENT:     Ms. Melany Collado  is a 81year old female admitted s/p fall and R hip fracture. Patient seen this PM for initial physical therapy evaluation: presents supine in bed and endorses 0/10 pain. Patient lives with her son in a single story residence with a ramp to enter. Chart review revealed that patient had L proximal humerus fracture 2 months ago. Contacted fracture coordinator Zandra Kunz and she report hat Dr. Bessie Ling treated the patient's humerus fracture. Attempted contacting Dr. Hayder Carson office, awaiting call back. Addendum: Per Dr. Hayder Carson office, patient is not permitted to bear weight on LUE. Will maintain NWB LUE until further clarification. Patient performed bed mobility with supervision, transfers with moderate assistance to the chair. Ms. Melany Collado presents with decreased functional mobility and balance/gait status from baseline. Recommend continued skilled PT services to address stated deficits. Will follow and progress toward stated goals during acute stay. This section established at most recent assessment   PROBLEM LIST (Impairments causing functional limitations):  1. Decreased Strength  2. Decreased ADL/Functional Activities  3. Decreased Transfer Abilities  4. Decreased Ambulation Ability/Technique  5. Decreased Balance   INTERVENTIONS PLANNED: (Benefits and precautions of physical therapy have been discussed with the patient.)  1. Balance Exercise  2. Bed Mobility  3. Gait Training  4. Therapeutic Activites  5. Therapeutic Exercise/Strengthening  6. Transfer Training  7. Group Therapy     TREATMENT PLAN: Frequency/Duration: twice daily for duration of hospital stay  Rehabilitation Potential For Stated Goals: Good     RECOMMENDED REHABILITATION/EQUIPMENT: (at time of discharge pending progress): Due to the probability of continued deficits (see above) this patient will likely need continued skilled physical therapy after discharge.   Equipment:    To be determined based on patient progress              HISTORY:   History of Present Injury/Illness (Reason for Referral):  Per H&P \" Debbi Prader is a 81 y.o. female who has a PMH of HTN, dyslipidemia, chronic anemia, kidney stones ( unable to take calcium supplements ), who came after she fell at home after she tripped. She hit her right hip and was unable to move after this. Of note, patient had a recent left humeral fracture 2 months ago, which was treated conservatively. Her son was present in the ER room and all questions were answered. Patient denied any dizziness, chest pain, sob, palpitations, dysuria, fever, chills. She was using a cane at home. \"  Past Medical History/Comorbidities:   Ms. 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 Ms. Arron Day  has a past surgical history that includes hx cholecystectomy; hx heent; hx appendectomy; hx urological; hx gyn; and hx tonsillectomy.   Social History/Living Environment:   Home Environment: Private residence  # Steps to Enter: 0  Wheelchair Ramp: Yes  One/Two Story Residence: One story  Living Alone: No  Support Systems: Child(forest)  Patient Expects to be Discharged to[de-identified] Rehabilitation facility  Current DME Used/Available at Home: Cane, straight (Use in community)  Tub or Shower Type: Tub/Shower combination (with bench and grab bars)  Prior Level of Function/Work/Activity:  Independent with functional mobility and ADLs     Number of Personal Factors/Comorbidities that affect the Plan of Care: 1-2: MODERATE COMPLEXITY   EXAMINATION:   Most Recent Physical Functioning:   Gross Assessment:  AROM: Generally decreased, functional  Strength: Generally decreased, functional  Sensation: Intact               Posture:     Balance:  Sitting: Intact  Standing: Impaired  Standing - Static: Good  Standing - Dynamic : Fair (+) Bed Mobility:  Supine to Sit: Supervision  Scooting: Stand-by assistance  Wheelchair Mobility:     Transfers:  Sit to Stand: Moderate assistance  Stand to Sit: Moderate assistance  Bed to Chair: Moderate assistance  Gait:  Right Side Weight Bearing: As tolerated  Ambulation - Level of Assistance: Moderate assistance      Body Structures Involved:  1. Bones  2. Joints  3. Muscles  4. Ligaments Body Functions Affected:  1. Neuromusculoskeletal  2. Movement Related Activities and Participation Affected:  1. General Tasks and Demands  2. Mobility  3. Self Care  4. Community, Social and [place]   Number of elements that affect the Plan of Care: 4+: HIGH COMPLEXITY   CLINICAL PRESENTATION:   Presentation: Evolving clinical presentation with changing clinical characteristics: MODERATE COMPLEXITY   CLINICAL DECISION MAKIN [facility] Inpatient Short Form  How much difficulty does the patient currently have. .. Unable A Lot A Little None   1. Turning over in bed (including adjusting bedclothes, sheets and blankets)? [] 1   [] 2   [] 3   [x] 4   2. Sitting down on and standing up from a chair with arms ( e.g., wheelchair, bedside commode, etc.)   [] 1   [x] 2   [] 3   [] 4   3. Moving from lying on back to sitting on the side of the bed? [] 1   [] 2   [x] 3   [] 4   How much help from another person does the patient currently need. .. Total A Lot A Little None   4. Moving to and from a bed to a chair (including a wheelchair)? [] 1   [x] 2   [] 3   [] 4   5. Need to walk in hospital room? [] 1   [] 2   [x] 3   [] 4   6.   Climbing 3-5 steps with a railing? [] 1   [x] 2   [] 3   [] 4   © 2007, Trustees of [address] [place] [address] [address] [id number], under license to [email or web address]. All rights reserved      Score:  Initial: 16 Most Recent: X (Date: -- )    Interpretation of Tool:  Represents activities that are increasingly more difficult (i.e. Bed mobility, Transfers, Gait). Score 24 23 22-20 19-15 14-10 9-7 6     Modifier CH CI CJ CK CL CM CN      ? Mobility - Walking and Moving Around:     - CURRENT STATUS: CK - 40%-59% impaired, limited or restricted    - GOAL STATUS: CJ - 20%-39% impaired, limited or restricted    - D/C STATUS:  ---------------To be determined---------------  Payor: Michelle Crooks / Plan: [address] [place] [address] HMO / Product Type: Managed Care Medicare /      Medical Necessity:     · Patient demonstrates good rehab potential due to higher previous functional level. Reason for Services/Other Comments:  · Patient continues to require skilled intervention due to decreased transfer and ambulation ability. Use of outcome tool(s) and clinical judgement create a POC that gives a: Clear prediction of patient's progress: LOW COMPLEXITY            TREATMENT:   (In addition to Assessment/Re-Assessment sessions the following treatments were rendered)   Pre-treatment Symptoms/Complaints:  No complaints of pain  Pain: Initial:   Pain Intensity 1: 0  Post Session:  Unchanged, patient comfortable in chair      Assessment/Reassessment only, no treatment provided today    Braces/Orthotics/Lines/Etc:   · limon catheter  · O2 Device: Room air  Treatment/Session Assessment:    · Response to Treatment:  Tolerated well  · Interdisciplinary Collaboration:   o Physical Therapist  o Occupational Therapist  o Registered Nurse  o SPT  · After treatment position/precautions:   o Up in chair  o Bed alarm/tab alert on  o Bed/Chair-wheels locked  o Bed in low position  o Call light within reach   · Compliance with Program/Exercises:  Will assess as treatment progresses. · Recommendations/Intent for next treatment session: \"Next visit will focus on advancements to more challenging activities and reduction in assistance provided\".   Total Treatment Duration:  PT Patient Time In/Time Out  Time In: 1500  Time Out: [address] [facility]

## 2018-04-11 NOTE — PROGRESS NOTES
Problem: Self Care Deficits Care Plan (Adult)  Goal: *Acute Goals and Plan of Care (Insert Text)  1. Patient will maintain WBAT status in RLE for 100% of treatment session and no verbal cues from therapist.   2. Patient will complete functional transfers with SBA while maintaining WBAT status in RLE and with adaptive equipment as needed. 3. Patient will complete lower body bathing and dressing with SBA and adaptive equipment as needed. 4. Patient will complete toileting and toilet transfer with SBA. 5. Patient will tolerate 23 minutes of OT treatment with less than 4 rest breaks to increase activity tolerance for ADLs. 6. Patient will participate in at least 23 minutes of BUE therapeutic exercises to strengthen BUE for functional transfers. Timeframe: 7 visits     Comments:     OCCUPATIONAL THERAPY: Initial Assessment and PM 4/11/2018  INPATIENT: Hospital Day: 2  Payor: Aureliano Alfalfa / Plan: [address] [place] [address] HMO / Product Type: [employer] /      NAME/AGE/GENDER: Gaetano Jessica is a 81 y.o. female   PRIMARY DIAGNOSIS:  Closed fracture of right hip, initial encounter ([facility] [S72.001A] Hip dislocation, right ([facility] Hip dislocation, right ([facility]  Procedure(s) (LRB):  HIP PERCUTANEOUS PINNING RIGHT/ 737/ CHOICE (Right)  Day of Surgery  ICD-10: Treatment Diagnosis:    · Pain in Right Hip (M25.551)  · Stiffness of Right Hip, Not elsewhere classified (M25.651)   Precautions/Allergies:    RLE WBAT, LUE with non-surgical proximal humerus fx-checking on WB status with MD Herrera [penicillins] and Sulfa (sulfonamide antibiotics)      ASSESSMENT:     Ms. Mark Chowdary presents to hospital for above procedure. PMHx significant for recent non-surgical proximal humeral fx, in which weight bearing status is unknown at present, will keep pt NWB LUE until confirmed by treating physician. Pt lives with son in a mobile home, where she has access to a ramp upon entering.  Pt is typically mod I to independence with ADLs. Today, pt is found supine in bed upon arrival, AOX4, and agreeable to OT. She denies pain this afternoon and is very pleasant/cooperative/talkative. Pt completes bed mobility with SBA/CGA for safety, demonstrating intact sitting balance at EOB. Pt left seated at EOB with PT student for evaluation. This therapist assisted as a tech with transfer to chair. Pt is functioning below her baseline and will benefit from continued skilled OT services to maximize safety and independence with ADLs. Will follow. This section established at most recent assessment   PROBLEM LIST (Impairments causing functional limitations):  1. Decreased Strength  2. Decreased ADL/Functional Activities  3. Decreased Transfer Abilities  4. Decreased Ambulation Ability/Technique  5. Decreased Balance  6. Increased Pain  7. Decreased Activity Tolerance  8. Decreased Knowledge of Precautions   INTERVENTIONS PLANNED: (Benefits and precautions of occupational therapy have been discussed with the patient.)  1. Activities of daily living training  2. Adaptive equipment training  3. Balance training  4. Clothing management  5. Donning&doffing training  6. Group therapy  7. Therapeutic activity  8. Therapeutic exercise     TREATMENT PLAN: Frequency/Duration: Follow patient 6x/week to address above goals. Rehabilitation Potential For Stated Goals: Good     RECOMMENDED REHABILITATION/EQUIPMENT: (at time of discharge pending progress): Due to the probability of continued deficits (see above) this patient will likely need continued skilled occupational therapy after discharge. Equipment:    None at this time              OCCUPATIONAL PROFILE AND HISTORY:   History of Present Injury/Illness (Reason for Referral):  See H&P  Past Medical History/Comorbidities:   Ms. Franklin Cadena  has a past medical history of Anemia, unspecified; Calculus of kidney; Depressive disorder, not elsewhere classified; Diabetes ([address] Hypertension;  Hypocalcemia and hypomagnesemia of ; Hypopotassemia; Hyposmolality and/or hyponatremia; Insomnia, unspecified; Irritable bowel syndrome; Other and unspecified hyperlipidemia (2015); [facility] Renal colic; Type II or unspecified type diabetes mellitus without mention of complication, not stated as uncontrolled; and Unspecified essential hypertension. Ms. Dalila Jaquze  has a past surgical history that includes hx cholecystectomy; hx heent; hx appendectomy; hx urological; hx gyn; and hx tonsillectomy. Social History/Living Environment:   Home Environment: Private residence  # Steps to Enter: 0  Wheelchair Ramp: Yes  One/Two Story Residence: One story  Living Alone: No  Support Systems: Child(forest)  Patient Expects to be Discharged to[de-identified] Rehabilitation facility  Current DME Used/Available at Home: Cane, straight (Use in community)  Tub or Shower Type: Tub/Shower combination (with bench and grab bars)  Prior Level of Function/Work/Activity:  [place] to mod I with ADLs  Personal Factors:          Sex:  female        Age:  81 y.o.    Number of Personal Factors/Comorbidities that affect the Plan of Care: Expanded review of therapy/medical records (1-2):  MODERATE COMPLEXITY   ASSESSMENT OF OCCUPATIONAL PERFORMANCE[de-identified]   Activities of Daily Living:           Basic ADLs (From Assessment) Complex ADLs (From Assessment)   Basic ADL  Feeding: Setup  Oral Facial Hygiene/Grooming: Setup  Bathing: Minimum assistance  Type of Bath: Chlorhexidine (CHG), Bath Pack  Upper Body Dressing: Setup  Lower Body Dressing: Minimum assistance  Toileting: Minimum assistance Instrumental ADL  Meal Preparation: Moderate assistance  Homemaking: Maximum assistance  Medication Management: Modified independent  Financial Management: Modified independent   Grooming/Bathing/Dressing Activities of Daily Living     Cognitive Retraining  Safety/Judgement: Fall prevention                       Bed/Mat Mobility  Supine to Sit: Supervision  Sit to Stand: Moderate assistance  Bed to Chair: Moderate assistance  Scooting: Stand-by assistance       Most Recent Physical Functioning:   Gross Assessment:  AROM: Within functional limits  Strength: Generally decreased, functional               Posture:     Balance:  Sitting: Intact  Standing: Impaired  Standing - Static: Good  Standing - Dynamic : Fair (+) Bed Mobility:  Supine to Sit: Supervision  Scooting: Stand-by assistance  Wheelchair Mobility:     Transfers:  Sit to Stand: Moderate assistance  Stand to Sit: Moderate assistance  Bed to Chair: Moderate assistance                Patient Vitals for the past 6 hrs:   BP SpO2 O2 Flow Rate (L/min) Pulse   18 1000 190/76 96 % - 64   18 1015 (!) 193/92 96 % - 75   18 1030 200/88 - - 75   18 1045 190/83 - - 70   18 1151 183/78 92 % - 72   18 1444 - 97 % 2 l/min -   18 1500 - 91 % - -       Mental Status  Neurologic State: Alert  Orientation Level: Oriented X4  Cognition: Follows commands  Perception: Appears intact  Perseveration: No perseveration noted  Safety/Judgement: Fall prevention                          Physical Skills Involved:  1. Balance  2. Strength  3. Activity Tolerance  4. Pain (acute) Cognitive Skills Affected (resulting in the inability to perform in a timely and safe manner): 1. none Psychosocial Skills Affected:  1. Habits/Routines  2. Environmental Adaptation  3. Social Roles   Number of elements that affect the Plan of Care: 5+:  HIGH COMPLEXITY   CLINICAL DECISION MAKIN [facility] [address] [address] AM-PAC 6 Clicks   Daily Activity Inpatient Short Form  How much help from another person does the patient currently need. .. Total A Lot A Little None   1. Putting on and taking off regular lower body clothing? [] 1   [] 2   [x] 3   [] 4   2. Bathing (including washing, rinsing, drying)? [] 1   [] 2   [x] 3   [] 4   3.   Toileting, which includes using toilet, bedpan or urinal?   [] 1   [] 2 [x] 3   [] 4   4. Putting on and taking off regular upper body clothing? [] 1   [] 2   [] 3   [x] 4   5. Taking care of personal grooming such as brushing teeth? [] 1   [] 2   [] 3   [x] 4   6. Eating meals? [] 1   [] 2   [] 3   [x] 4   © 2007, Trustees of [address] [place] [address] [address] [address], under license to [email or web address]. All rights reserved      Score:  Initial: 21 Most Recent: X (Date: -- )    Interpretation of Tool:  Represents activities that are increasingly more difficult (i.e. Bed mobility, Transfers, Gait). Score 24 23 22-20 19-15 14-10 9-7 6     Modifier CH CI CJ CK CL CM CN      ? Self Care:     - CURRENT STATUS: CJ - 20%-39% impaired, limited or restricted    - GOAL STATUS: CI - 1%-19% impaired, limited or restricted    - D/C STATUS:  ---------------To be determined---------------  Payor: Alba Shane / Plan: [address] [place] [address] HMO / Product Type: [employer] Medicare /      Medical Necessity:     · Patient is expected to demonstrate progress in strength, balance and functional technique to increase independence with ADLs. Reason for Services/Other Comments:  · Patient continues to require modification of therapeutic interventions to increase complexity of exercises.    Use of outcome tool(s) and clinical judgement create a POC that gives a: LOW COMPLEXITY         TREATMENT:   (In addition to Assessment/Re-Assessment sessions the following treatments were rendered)     Pre-treatment Symptoms/Complaints:    Pain: Initial:   Pain Intensity 1: 0  Post Session:  same     Assessment/Reassessment only, no treatment provided today    Braces/Orthotics/Lines/Etc:   · limon catheter  · O2 Device: Room air  Treatment/Session Assessment:    · Response to Treatment:  Eval only   · Interdisciplinary Collaboration:   o Physical Therapist  o Occupational Therapist  o Registered Nurse  · After treatment position/precautions:   o with PT   · Compliance with Program/Exercises: compliant all of the time.  · Recommendations/Intent for next treatment session: \"Next visit will focus on advancements to more challenging activities and reduction in assistance provided\".   Total Treatment Duration:  OT Patient Time In/Time Out  Time In: 2284  Time Out: Reba

## 2018-04-11 NOTE — BRIEF OP NOTE
BRIEF OPERATIVE NOTE    Date of Procedure: 4/11/2018   Preoperative Diagnosis: Closed fracture of right [facility], [facility] [S72.001A]  Postoperative Diagnosis: Right femoral neck fracture    Procedure(s):  HIP PERCUTANEOUS PINNING RIGHT  Surgeon(s) and Role:     * Taj Grimm MD - Primary         Surgical Assistant: NONE    Surgical Staff:  Circ-1: Angel Harris RN  Scrub Tech-1: Tawana Landers  Scrub Tech-2: Alethea Raymond  Scrub Tech-3: Jose G Gonsalves  Event Time In   Incision Start 3081   Incision Close 0828     Anesthesia: Spinal   Estimated Blood Loss: 50 CC  Specimens: * No specimens in log *   Findings: NONE   Complications: NONE  Implants:   Implant Name Type Inv.  Item Serial No.  Lot No. LRB No. Used Action   SCR BNE WOLF 16MM THRD 6.5X75 --  - [id number]  SCR BNE WOLF 16MM THRD 6.5X75 --   [facility] [id number] Right 1 Implanted   SCR BNE WOLF 16MM THRD 6.5X80 --  - [id number]  SCR BNE WOLF 16MM THRD 6.5X80 --   [facility] [id number] Right 1 Implanted   SCR BNE WOLF 16MM THRD 6.5X90 --  - [id number]   SCR BNE WOLF 16MM THRD 6.5X90 --    [facility] [id number] Right 1 Implanted

## 2018-04-11 NOTE — PERIOP NOTES
TRANSFER - OUT REPORT:    Verbal report given to Karly Michael  on Luna Armstrong  being transferred to [address] [place] [address]  for routine progression of care       Report consisted of patients Situation, Background, Assessment and   Recommendations(SBAR). Information from the following report(s) SBAR, OR Summary and MAR was reviewed with the receiving nurse. Lines:   Peripheral IV 04/10/18 Right Antecubital (Active)   Site Assessment Clean, dry, & intact 4/11/2018  9:06 AM   Phlebitis Assessment 0 4/11/2018  9:06 AM   Infiltration Assessment 0 4/11/2018  9:06 AM   Dressing Status Clean, dry, & intact 4/11/2018  9:06 AM   Dressing Type Transparent;Tape 4/11/2018  9:06 AM   Hub Color/Line Status Infusing 4/11/2018  9:06 AM        Opportunity for questions and clarification was provided. Patient transported with:   O2 @ 2 liters    VTE prophylaxis orders have been written for Luna Armstrong. Patient and family given floor number and nurses name. Family updated re: pt status after security code verified.

## 2018-04-11 NOTE — PROGRESS NOTES
An Oxygen Spot Check was performed on Pt. Her SPO2 on 2L was 97%. When I decreased her O2 to 1L of Oxygen Her SPO2 was 95%. On Room Air at Rest Her SPO2 was 90% with a HR of 61 bpm. I placed her back on 1 L.

## 2018-04-11 NOTE — ANESTHESIA PREPROCEDURE EVALUATION
Anesthetic History   No history of anesthetic complications            Review of Systems / Medical History  Patient summary reviewed, nursing notes reviewed and pertinent labs reviewed    Pulmonary  Within defined limits                 Neuro/Psych         Psychiatric history     Cardiovascular    Hypertension        Dysrhythmias (palpitations- controlled witrh beta blocker)         Comments: Echo Aug 2015- EF 60%, diast dysfxn, AV sclerosis without stenosis   GI/Hepatic/Renal     GERD: well controlled           Endo/Other    Diabetes: type 2         Other Findings            Physical Exam    Airway  Mallampati: II      Mouth opening: Normal     Cardiovascular  Regular rate and rhythm,  S1 and S2 normal,  no murmur, click, rub, or gallop             Dental    Dentition: Full upper dentures and Full lower dentures     Pulmonary  Breath sounds clear to auscultation               Abdominal         Other Findings            Anesthetic Plan    ASA: 3  Anesthesia type: general          Induction: Intravenous  Anesthetic plan and risks discussed with: Patient and Son / Daughter      Pt and son prefer GA.

## 2018-04-11 NOTE — ANESTHESIA POSTPROCEDURE EVALUATION
Post-Anesthesia Evaluation and Assessment    Patient: Kelly Bobo MRN: [id number]  SSN: [id number]    YOB: 1935  Age: 81 y.o. Sex: female       Cardiovascular Function/Vital Signs  Visit Vitals    /62 (BP 1 Location: Right arm, BP Patient Position: At rest;Supine)    Pulse 68    Temp 36.9 °C (98.5 °F)    Resp 12    Ht 5' 6\" (1.676 m)    Wt 64.9 kg (143 lb)    SpO2 93%    BMI 23.08 kg/m2       Patient is status post general anesthesia for Procedure(s):  HIP PERCUTANEOUS PINNING RIGHT/ 737/ CHOICE. Nausea/Vomiting: None    Postoperative hydration reviewed and adequate. Pain:  Pain Scale 1: Numeric (0 - 10) (04/11/18 0906)  Pain Intensity 1: 4 (04/11/18 0906)   Managed    Neurological Status:   Neuro (WDL): Within Defined Limits (04/11/18 0906)  Neuro  Neurologic State: Drowsy (04/11/18 2784)  Orientation Level: Unable to verbalize (04/11/18 0834)  Cognition: Follows commands (04/10/18 2005)  RLE Motor Response: Weak (04/10/18 2005)   At baseline    Mental Status and Level of Consciousness: Awake. Pulmonary Status:   O2 Device: Nasal cannula (04/11/18 0834)   Adequate oxygenation and airway patent    Complications related to anesthesia: None    Post-anesthesia assessment completed. No concerns  Based on chart review.   Signed By: Criss Dominguez MD     April 11, 2018

## 2018-04-11 NOTE — PROGRESS NOTES
Physical Therapy Note:    Chart reviewed for physical therapy evaluation, patient has recent history of L UE fracture and no weight bearing orders for her LUE. Called fracture coordinator Destiny Farmer, she reports that patient was seeing Dr. Vaughn Alvarez orthopedist at [facility] for this fracture, so attempted to call Dr. Henriquez Batter office for clarification. Awaiting call back. Will make LUE NWB until further clarification obtained. Thank you,  Howard Medel, DPT    Addendum: Patient not permitted to bear weight on LUE per Dr. Vaughn Alvarez.

## 2018-04-12 LAB
BACTERIA SPEC CULT: ABNORMAL
BACTERIA SPEC CULT: ABNORMAL
BASOPHILS # BLD: 0 K/UL (ref 0–0.2)
BASOPHILS NFR BLD: 0 % (ref 0–2)
DIFFERENTIAL METHOD BLD: ABNORMAL
EOSINOPHIL # BLD: 0.1 K/UL (ref 0–0.8)
EOSINOPHIL NFR BLD: 1 % (ref 0.5–7.8)
ERYTHROCYTE [DISTWIDTH] IN BLOOD BY AUTOMATED COUNT: 14.2 % (ref 11.9–14.6)
GLUCOSE BLD STRIP.AUTO-MCNC: 105 MG/DL (ref 65–100)
GLUCOSE BLD STRIP.AUTO-MCNC: 109 MG/DL (ref 65–100)
GLUCOSE BLD STRIP.AUTO-MCNC: 167 MG/DL (ref 65–100)
GLUCOSE BLD STRIP.AUTO-MCNC: 85 MG/DL (ref 65–100)
HCT VFR BLD AUTO: 29.8 % (ref 35.8–46.3)
HGB BLD-MCNC: 10.1 G/DL (ref 11.7–15.4)
IMM GRANULOCYTES # BLD: 0.1 K/UL (ref 0–0.5)
IMM GRANULOCYTES NFR BLD AUTO: 0 % (ref 0–5)
LYMPHOCYTES # BLD: 2.2 K/UL (ref 0.5–4.6)
LYMPHOCYTES NFR BLD: 16 % (ref 13–44)
MCH RBC QN AUTO: 30.1 PG (ref 26.1–32.9)
MCHC RBC AUTO-ENTMCNC: 33.9 G/DL (ref 31.4–35)
MCV RBC AUTO: 89 FL (ref 79.6–97.8)
MM INDURATION POC: 0 MM (ref 0–5)
MONOCYTES # BLD: 1 K/UL (ref 0.1–1.3)
MONOCYTES NFR BLD: 7 % (ref 4–12)
NEUTS SEG # BLD: 10.3 K/UL (ref 1.7–8.2)
NEUTS SEG NFR BLD: 76 % (ref 43–78)
PLATELET # BLD AUTO: 313 K/UL (ref 150–450)
PMV BLD AUTO: 9.8 FL (ref 10.8–14.1)
PPD POC: NEGATIVE NEGATIVE
RBC # BLD AUTO: 3.35 M/UL (ref 4.05–5.25)
SERVICE CMNT-IMP: ABNORMAL
WBC # BLD AUTO: 13.6 K/UL (ref 4.3–11.1)

## 2018-04-12 PROCEDURE — 74011636637 HC RX REV CODE- 636/637: Performed by: INTERNAL MEDICINE

## 2018-04-12 PROCEDURE — 97116 GAIT TRAINING THERAPY: CPT

## 2018-04-12 PROCEDURE — 85025 COMPLETE CBC W/AUTO DIFF WBC: CPT | Performed by: NURSE PRACTITIONER

## 2018-04-12 PROCEDURE — 65270000029 HC RM PRIVATE

## 2018-04-12 PROCEDURE — 97535 SELF CARE MNGMENT TRAINING: CPT

## 2018-04-12 PROCEDURE — 97150 GROUP THERAPEUTIC PROCEDURES: CPT

## 2018-04-12 PROCEDURE — 74011250637 HC RX REV CODE- 250/637: Performed by: INTERNAL MEDICINE

## 2018-04-12 PROCEDURE — 82962 GLUCOSE BLOOD TEST: CPT

## 2018-04-12 PROCEDURE — 74011250637 HC RX REV CODE- 250/637: Performed by: NURSE PRACTITIONER

## 2018-04-12 PROCEDURE — 74011000258 HC RX REV CODE- 258: Performed by: INTERNAL MEDICINE

## 2018-04-12 PROCEDURE — 36415 COLL VENOUS BLD VENIPUNCTURE: CPT | Performed by: NURSE PRACTITIONER

## 2018-04-12 PROCEDURE — 74011250636 HC RX REV CODE- 250/636: Performed by: INTERNAL MEDICINE

## 2018-04-12 PROCEDURE — 74011250636 HC RX REV CODE- 250/636: Performed by: NURSE PRACTITIONER

## 2018-04-12 RX ADMIN — VERAPAMIL HYDROCHLORIDE 40 MG: 80 TABLET, FILM COATED ORAL at 08:45

## 2018-04-12 RX ADMIN — MUPIROCIN: 2 CREAM TOPICAL at 08:47

## 2018-04-12 RX ADMIN — VANCOMYCIN HYDROCHLORIDE 1000 MG: 1 INJECTION, POWDER, LYOPHILIZED, FOR SOLUTION INTRAVENOUS at 07:27

## 2018-04-12 RX ADMIN — ACETAMINOPHEN 650 MG: 325 TABLET ORAL at 21:02

## 2018-04-12 RX ADMIN — VERAPAMIL HYDROCHLORIDE 40 MG: 80 TABLET, FILM COATED ORAL at 18:10

## 2018-04-12 RX ADMIN — DOCUSATE SODIUM 100 MG: 100 CAPSULE, LIQUID FILLED ORAL at 08:45

## 2018-04-12 RX ADMIN — INSULIN LISPRO 2 UNITS: 100 INJECTION, SOLUTION INTRAVENOUS; SUBCUTANEOUS at 18:10

## 2018-04-12 RX ADMIN — BENAZEPRIL HYDROCHLORIDE 40 MG: 10 TABLET, FILM COATED ORAL at 08:44

## 2018-04-12 RX ADMIN — CEFTRIAXONE SODIUM 1 G: 1 INJECTION, POWDER, FOR SOLUTION INTRAMUSCULAR; INTRAVENOUS at 13:00

## 2018-04-12 RX ADMIN — ACETAMINOPHEN 650 MG: 325 TABLET ORAL at 13:02

## 2018-04-12 RX ADMIN — TRAMADOL HYDROCHLORIDE 50 MG: 50 TABLET, FILM COATED ORAL at 21:02

## 2018-04-12 RX ADMIN — Medication 10 ML: at 05:09

## 2018-04-12 RX ADMIN — ENOXAPARIN SODIUM 30 MG: 30 INJECTION SUBCUTANEOUS at 08:45

## 2018-04-12 RX ADMIN — METOPROLOL SUCCINATE 200 MG: 100 TABLET, EXTENDED RELEASE ORAL at 08:44

## 2018-04-12 RX ADMIN — HYDRALAZINE HYDROCHLORIDE 50 MG: 50 TABLET, FILM COATED ORAL at 05:08

## 2018-04-12 RX ADMIN — Medication 10 ML: at 21:04

## 2018-04-12 RX ADMIN — HYDRALAZINE HYDROCHLORIDE 50 MG: 50 TABLET, FILM COATED ORAL at 21:02

## 2018-04-12 RX ADMIN — METOPROLOL SUCCINATE 200 MG: 100 TABLET, EXTENDED RELEASE ORAL at 21:02

## 2018-04-12 RX ADMIN — MUPIROCIN: 2 CREAM TOPICAL at 18:11

## 2018-04-12 RX ADMIN — HYDRALAZINE HYDROCHLORIDE 50 MG: 50 TABLET, FILM COATED ORAL at 13:09

## 2018-04-12 RX ADMIN — HYDRALAZINE HYDROCHLORIDE 10 MG: 20 INJECTION INTRAMUSCULAR; INTRAVENOUS at 11:46

## 2018-04-12 RX ADMIN — SODIUM CHLORIDE, SODIUM LACTATE, POTASSIUM CHLORIDE, AND CALCIUM CHLORIDE 75 ML/HR: 600; 310; 30; 20 INJECTION, SOLUTION INTRAVENOUS at 11:48

## 2018-04-12 RX ADMIN — ACETAMINOPHEN 650 MG: 325 TABLET ORAL at 05:08

## 2018-04-12 NOTE — PROGRESS NOTES
Problem: Self Care Deficits Care Plan (Adult)  Goal: *Acute Goals and Plan of Care (Insert Text)  1. Patient will maintain WBAT status in RLE for 100% of treatment session and no verbal cues from therapist.   2. Patient will complete functional transfers with SBA while maintaining WBAT status in RLE and with adaptive equipment as needed. 3. Patient will complete lower body bathing and dressing with SBA and adaptive equipment as needed. 4. Patient will complete toileting and toilet transfer with SBA. 5. Patient will tolerate 23 minutes of OT treatment with less than 4 rest breaks to increase activity tolerance for ADLs. 6. Patient will participate in at least 23 minutes of BUE therapeutic exercises to strengthen BUE for functional transfers. Timeframe: 7 visits     Comments:     OCCUPATIONAL THERAPY: Daily Note, Treatment Day: 1st and AM 4/12/2018  INPATIENT: Hospital Day: 3  Payor: Meli Rosario / Plan: [address] [place] [address] HMO / Product Type: Managed Care Medicare /      NAME/AGE/GENDER: Brooke Lam is a 81 y.o. female   PRIMARY DIAGNOSIS:  Closed fracture of right hip, initial encounter ([address] [S72.001A] Hip dislocation, right ([address] Hip dislocation, right ([address]  Procedure(s) (LRB):  HIP PERCUTANEOUS PINNING RIGHT/ 737/ CHOICE (Right)  1 Day Post-Op  ICD-10: Treatment Diagnosis:    · Pain in Right Hip (M25.551)  · Stiffness of Right Hip, Not elsewhere classified (M25.651)   Precautions/Allergies:    RLE WBAT, LUE with non-surgical proximal humerus fx-checking on WB status with MD Herrera [penicillins] and Sulfa (sulfonamide antibiotics)      ASSESSMENT:     Ms. Desean Thompson presents to hospital for above procedure. Pt was sitting up in chair. Pt completed sponge bath and dressing with the assistance listed below while sitting at sink side. Continue POC. This section established at most recent assessment   PROBLEM LIST (Impairments causing functional limitations):  1.  Decreased Strength  2. Decreased ADL/Functional Activities  3. Decreased Transfer Abilities  4. Decreased Ambulation Ability/Technique  5. Decreased Balance  6. Increased Pain  7. Decreased Activity Tolerance  8. Decreased Knowledge of Precautions   INTERVENTIONS PLANNED: (Benefits and precautions of occupational therapy have been discussed with the patient.)  1. Activities of daily living training  2. Adaptive equipment training  3. Balance training  4. Clothing management  5. Donning&doffing training  6. Group therapy  7. Therapeutic activity  8. Therapeutic exercise     TREATMENT PLAN: Frequency/Duration: Follow patient 6x/week to address above goals. Rehabilitation Potential For Stated Goals: Good     RECOMMENDED REHABILITATION/EQUIPMENT: (at time of discharge pending progress): Due to the probability of continued deficits (see above) this patient will likely need continued skilled occupational therapy after discharge. Equipment:    None at this time              OCCUPATIONAL PROFILE AND HISTORY:   History of Present Injury/Illness (Reason for Referral):  See H&P  Past Medical History/Comorbidities:   Ms. 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 Ms. Dorothae Toledo  has a past surgical history that includes hx cholecystectomy; hx heent; hx appendectomy; hx urological; hx gyn; and hx tonsillectomy.   Social History/Living Environment:   Home Environment: Private residence  # Steps to Enter: 0  Wheelchair Ramp: Yes  One/Two Story Residence: One story  Living Alone: No  Support Systems: Child(forest)  Patient Expects to be Discharged to[de-identified] Rehabilitation facility  Current DME Used/Available at Home: Cane, straight (Use in community)  Tub or Shower Type: Tub/Shower combination (with bench and grab bars)  Prior Level of Function/Work/Activity:  [place] to mod I with ADLs  Personal Factors:          Sex:  female        Age:  81 y.o. Number of Personal Factors/Comorbidities that affect the Plan of Care: Expanded review of therapy/medical records (1-2):  MODERATE COMPLEXITY   ASSESSMENT OF OCCUPATIONAL PERFORMANCE[de-identified]   Activities of Daily Living:           Basic ADLs (From Assessment) Complex ADLs (From Assessment)   Basic ADL  Feeding: Setup  Oral Facial Hygiene/Grooming: Setup  Bathing: Minimum assistance  Type of Bath: Bath Pack, Full, Shower  Upper Body Dressing: Setup  Lower Body Dressing: Minimum assistance  Toileting: Minimum assistance Instrumental ADL  Meal Preparation: Moderate assistance  Homemaking: Maximum assistance  Medication Management: Modified independent  Financial Management: Modified independent   Grooming/Bathing/Dressing Activities of Daily Living   Grooming  Washing Face: Supervision/set-up Cognitive Retraining  Safety/Judgement: Fall prevention   Upper Body Bathing  Bathing Assistance: Supervision/set-up  Adaptive Equipment: Shower chair     Lower Body Bathing  Bathing Assistance: Minimum assistance  Perineal  : Supervision/set-up  Lower Body : Minimum assistance     Upper Body Dressing Assistance  Dressing Assistance: Minimum assistance  Hospital Gown: Minimum  assistance     Lower Body Dressing Assistance  Dressing Assistance: Moderate assistance  Socks:  Moderate assistance  Cues: Doff;Don;Physical assistance Bed/Mat Mobility  Sit to Stand: Supervision       Most Recent Physical Functioning:   Gross Assessment:                  Posture:     Balance:    Bed Mobility:     Wheelchair Mobility:     Transfers:  Sit to Stand: Supervision                Patient Vitals for the past 6 hrs:   BP BP Patient Position SpO2 O2 Flow Rate (L/min) Pulse   18 0558 163/61 - 96 % - 68   18 0809 178/79 At rest 96 % 2 l/min 70   18 1129 175/73 Sitting 98 % 2 l/min (!) 59       Mental Status  Neurologic State: Alert  Orientation Level: Oriented to person  Cognition: Follows commands  Perception: Verbal, Tactile  Perseveration: Tactile cues provided, Verbal cues provided  Safety/Judgement: Fall prevention                          Physical Skills Involved:  1. Balance  2. Strength  3. Activity Tolerance  4. Pain (acute) Cognitive Skills Affected (resulting in the inability to perform in a timely and safe manner): 1. none Psychosocial Skills Affected:  1. Habits/Routines  2. Environmental Adaptation  3. Social Roles   Number of elements that affect the Plan of Care: 5+:  HIGH COMPLEXITY   CLINICAL DECISION MAKIN[address] [place] [id number] AM-PAC 6 Clicks   Daily Activity Inpatient Short Form  How much help from another person does the patient currently need. .. Total A Lot A Little None   1. Putting on and taking off regular lower body clothing? [] 1   [] 2   [x] 3   [] 4   2. Bathing (including washing, rinsing, drying)? [] 1   [] 2   [x] 3   [] 4   3. Toileting, which includes using toilet, bedpan or urinal?   [] 1   [] 2   [x] 3   [] 4   4. Putting on and taking off regular upper body clothing? [] 1   [] 2   [] 3   [x] 4   5. Taking care of personal grooming such as brushing teeth? [] 1   [] 2   [] 3   [x] 4   6. Eating meals? [] 1   [] 2   [] 3   [x] 4   © , Trustees of [address] [place] [address] [address] [address], under license to [employer]. All rights reserved      Score:  Initial: 21 Most Recent: X (Date: -- )    Interpretation of Tool:  Represents activities that are increasingly more difficult (i.e. Bed mobility, Transfers, Gait). Score 24 23 22-20 19-15 14-10 9-7 6     Modifier CH CI CJ CK CL CM CN      ?  Self Care:     - CURRENT STATUS: CJ - 20%-39% impaired, limited or restricted    - GOAL STATUS: CI - 1%-19% impaired, limited or restricted    - D/C STATUS:  ---------------To be determined---------------  Payor: Geri Pierson / Plan: [facility] [employer] / Product Type: Managed Care Medicare /      Medical Necessity:     · Patient is expected to demonstrate progress in strength, balance and functional technique to increase independence with ADLs. Reason for Services/Other Comments:  · Patient continues to require modification of therapeutic interventions to increase complexity of exercises. Use of outcome tool(s) and clinical judgement create a POC that gives a: LOW COMPLEXITY         TREATMENT:   (In addition to Assessment/Re-Assessment sessions the following treatments were rendered)     Pre-treatment Symptoms/Complaints:    Pain: Initial:   Pain Intensity 1: 2  Pain Location 1: Hip  Pain Intervention(s) 1: Repositioned  Post Session:  same     Self Care: (23): Procedure(s) (per grid) utilized to improve and/or restore self-care/home management as related to dressing and bathing. Required min verbal and manual cueing to facilitate activities of daily living skills. Braces/Orthotics/Lines/Etc:   · 02   · IV  Treatment/Session Assessment:    · Response to Treatment:  Progressing well   · Interdisciplinary Collaboration:   o Physical Therapist  o Certified Occupational Therapy Assistant  o Registered Nurse  · After treatment position/precautions:   o Up in chair  o Bed alarm/tab alert on  o Bed/Chair-wheels locked  o Bed in low position  o Call light within reach  o RN notified   · Compliance with Program/Exercises: compliant all of the time. · Recommendations/Intent for next treatment session: \"Next visit will focus on advancements to more challenging activities and reduction in assistance provided\".   Total Treatment Duration:  OT Patient Time In/Time Out  Time In: 0940  Time Out: 1003     Claudette GLASS Cayetano Barrow

## 2018-04-12 NOTE — PROGRESS NOTES
Problem: Falls - Risk of  Goal: *Absence of Falls  Document Bharati Fall Risk and appropriate interventions in the flowsheet.    Outcome: Progressing Towards Goal  Fall Risk Interventions:  Mobility Interventions: Bed/chair exit alarm, Communicate number of staff needed for ambulation/transfer, OT consult for ADLs, Patient to call before getting OOB, PT Consult for mobility concerns, PT Consult for assist device competence, Utilize walker, cane, or other assitive device    Mentation Interventions: Adequate sleep, hydration, pain control, Bed/chair exit alarm, Door open when patient unattended, Reorient patient, More frequent rounding, Increase mobility, Room close to nurse's station, Toileting rounds, Update white board    Medication Interventions: Bed/chair exit alarm, Evaluate medications/consider consulting pharmacy, Patient to call before getting OOB, Teach patient to arise slowly    Elimination Interventions: Bed/chair exit alarm, Call light in reach, Patient to call for help with toileting needs, Toileting schedule/hourly rounds    History of Falls Interventions: Bed/chair exit alarm, Consult care management for discharge planning, Door open when patient unattended, Evaluate medications/consider consulting pharmacy, Room close to nurse's station, Investigate reason for fall

## 2018-04-12 NOTE — PROGRESS NOTES
Problem: Mobility Impaired (Adult and Pediatric)  Goal: *Acute Goals and Plan of Care (Insert Text)  STG:  (1.)Ms. Claudia Talbot will move from supine to sit and sit to supine , scoot up and down and roll side to side with MODIFIED INDEPENDENCE within 3 treatment day(s). (2.)Ms. Claudia Talbot will transfer from bed to chair and chair to bed with MINIMAL ASSIST using the least restrictive device within 3 treatment day(s). (3.)Ms. Claudia Talbot will ambulate with MINIMAL ASSIST for 100 feet with the least restrictive device within 3 treatment day(s). (4.)Ms. Claudia Talbot will perform LE exercises with 1 to 2 cues for form within 3 days to improve strength for functional transfers and ambulation. LTG:  (1.)Ms. Claudia Talbot will move from supine to sit and sit to supine , scoot up and down and roll side to side in bed with INDEPENDENT within 7 treatment day(s). (2.)Ms. Claudia Talbot will transfer from bed to chair and chair to bed with CONTACT GUARD ASSIST using the least restrictive device within 7 treatment day(s). (3.)Ms. Claudia Talbot will ambulate with CONTACT GUARD ASSIST for 100 feet with the least restrictive device within 7 treatment day(s).   ________________________________________________________________________________________________       PHYSICAL THERAPY: Daily Note, Treatment Day: 1st, PM 4/12/2018  INPATIENT: Hospital Day: 3  Payor: Nils Madison / Plan: [address] [place] [address] HMO / Product Type: Managed Care Medicare /      Kevin BLANTON and CÉSAR HAILE     NAME/AGE/GENDER: Marck Brush is a 81 y.o. female   PRIMARY DIAGNOSIS: Closed fracture of right hip, initial encounter ([facility] [S72.001A] Hip dislocation, right ([facility] Hip dislocation, right ([facility])  Procedure(s) (LRB):  HIP PERCUTANEOUS PINNING RIGHT/ 737/ CHOICE (Right)  1 Day Post-Op  ICD-10: Treatment Diagnosis:    · Generalized Muscle Weakness (M62.81)  · Other abnormalities of gait and mobility (R26.89)   Precaution/Allergies:  Pcn [penicillins] and Sulfa (sulfonamide antibiotics)      ASSESSMENT:     Ms. Dorothea Toledo  is a 81year old female admitted s/p fall and R hip fracture. Patient was sitting up in recliner chair upon contact and agreeable to PT with MAX encouragement from therapist and patient's son. Unclear why patient did not want to participate in physical therapy? Patient able to transfer to standing with mod assist and cues to maintain NWB status on LUE as well as proper technique (hand placement on RUE). Once standing patient able to perform gait training x 15' with use of bud walker, and below cues in gait section. Patient refused to perform additional treatment including therapeutic exercises. Overall slow, steady progress towards physical therapy goals. No goals have been met thus far. Will continue efforts. This section established at most recent assessment   PROBLEM LIST (Impairments causing functional limitations):  1. Decreased Strength  2. Decreased ADL/Functional Activities  3. Decreased Transfer Abilities  4. Decreased Ambulation Ability/Technique  5. Decreased Balance   INTERVENTIONS PLANNED: (Benefits and precautions of physical therapy have been discussed with the patient.)  1. Balance Exercise  2. Bed Mobility  3. Gait Training  4. Therapeutic Activites  5. Therapeutic Exercise/Strengthening  6. Transfer Training  7. Group Therapy     TREATMENT PLAN: Frequency/Duration: twice daily for duration of hospital stay  Rehabilitation Potential For Stated Goals: Good     RECOMMENDED REHABILITATION/EQUIPMENT: (at time of discharge pending progress): Due to the probability of continued deficits (see above) this patient will likely need continued skilled physical therapy after discharge.   Equipment:    To be determined based on patient progress              HISTORY:   History of Present Injury/Illness (Reason for Referral):  Per H&P \" Scott Johnson is a 81 y.o. female who has a PMH of HTN, dyslipidemia, chronic anemia, kidney stones ( unable to take calcium supplements ), who came after she fell at home after she tripped. She hit her right hip and was unable to move after this. Of note, patient had a recent left humeral fracture 2 months ago, which was treated conservatively. Her son was present in the ER room and all questions were answered. Patient denied any dizziness, chest pain, sob, palpitations, dysuria, fever, chills. She was using a cane at home. \"  Past Medical History/Comorbidities:   Ms. 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 Ms. Bryanna Sands  has a past surgical history that includes hx cholecystectomy; hx heent; hx appendectomy; hx urological; hx gyn; and hx tonsillectomy.   Social History/Living Environment:   Home Environment: Private residence  # Steps to Enter: 0  Wheelchair Ramp: Yes  One/Two Story Residence: One story  Living Alone: No  Support Systems: Child(forest)  Patient Expects to be Discharged to[de-identified] Rehabilitation facility  Current DME Used/Available at Home: Cane, straight (Use in community)  Tub or Shower Type: Tub/Shower combination (with bench and grab bars)  Prior Level of Function/Work/Activity:  Independent with functional mobility and ADLs     Number of Personal Factors/Comorbidities that affect the Plan of Care: 1-2: MODERATE COMPLEXITY   EXAMINATION:   Most Recent Physical Functioning:   Gross Assessment:                  Posture:     Balance:  Sitting: Intact  Standing: Impaired  Standing - Static: Fair  Standing - Dynamic : Fair Bed Mobility:     Wheelchair Mobility:     Transfers:  Sit to Stand: Moderate assistance  Stand to Sit: Minimum assistance  Gait:  Right Side Weight Bearing: As tolerated  Base of Support: Narrowed  Speed/Maren: Slow;Shuffled  Step Length: Right shortened;Left shortened  Gait Abnormalities: Decreased step clearance;Trunk sway increased; Shuffling gait; Step to gait  Distance (ft): 15 Feet (ft)  Assistive Device: Walker bud  Ambulation - Level of Assistance: Minimal assistance; Moderate assistance  Interventions: Safety awareness training; Tactile cues; Verbal cues;Manual cues      Body Structures Involved:  1. Bones  2. Joints  3. Muscles  4. Ligaments Body Functions Affected:  1. Neuromusculoskeletal  2. Movement Related Activities and Participation Affected:  1. General Tasks and Demands  2. Mobility  3. Self Care  4. Community, Social and [place]   Number of elements that affect the Plan of Care: 4+: HIGH COMPLEXITY   CLINICAL PRESENTATION:   Presentation: Evolving clinical presentation with changing clinical characteristics: MODERATE COMPLEXITY   CLINICAL DECISION MAKIN [facility] Inpatient Short Form  How much difficulty does the patient currently have. .. Unable A Lot A Little None   1. Turning over in bed (including adjusting bedclothes, sheets and blankets)? [] 1   [] 2   [] 3   [x] 4   2. Sitting down on and standing up from a chair with arms ( e.g., wheelchair, bedside commode, etc.)   [] 1   [x] 2   [] 3   [] 4   3. Moving from lying on back to sitting on the side of the bed? [] 1   [] 2   [x] 3   [] 4   How much help from another person does the patient currently need. .. Total A Lot A Little None   4. Moving to and from a bed to a chair (including a wheelchair)? [] 1   [x] 2   [] 3   [] 4   5. Need to walk in hospital room? [] 1   [] 2   [x] 3   [] 4   6. Climbing 3-5 steps with a railing?    [] 1   [x] 2   [] 3   [] 4   © 2007, Trustees of [address] [place] [address] [address] [address], under license to [employer]. All rights reserved      Score:  Initial: 16 Most Recent: X (Date: -- )    Interpretation of Tool:  Represents activities that are increasingly more difficult (i.e. Bed mobility, Transfers, Gait). Score 24 23 22-20 19-15 14-10 9-7 6     Modifier CH CI CJ CK CL CM CN      ? Mobility - Walking and Moving Around:     - CURRENT STATUS: CK - 40%-59% impaired, limited or restricted    - GOAL STATUS: CJ - 20%-39% impaired, limited or restricted    - D/C STATUS:  ---------------To be determined---------------  Payor: Yung Delgado / Plan: [address] [place] [address] HMO / Product Type: [place] [employer] Care Medicare /      Medical Necessity:     · Patient demonstrates good rehab potential due to higher previous functional level. Reason for Services/Other Comments:  · Patient continues to require skilled intervention due to decreased transfer and ambulation ability. Use of outcome tool(s) and clinical judgement create a POC that gives a: Clear prediction of patient's progress: LOW COMPLEXITY            TREATMENT:   (In addition to Assessment/Re-Assessment sessions the following treatments were rendered)   Pre-treatment Symptoms/Complaints:  No complaints of pain  Pain: Initial:   Pain Intensity 1: 0  Post Session:  Unchanged, patient comfortable in chair      Gait Training (  10 Minutes):  Gait training to improve and/or restore physical functioning as related to mobility, strength and balance. Ambulated 15 Feet (ft) with Minimal assistance; Moderate assistance using a Walker bud and moderate Safety awareness training; Tactile cues; Verbal cues;Manual cues related to their sequencing, walker manipulation, step length, weight shifts, UE support on bud walker, and foot placement to promote proper body alignment, promote proper body posture and promote proper body mechanics.   Instruction in performance of the above deficits to correct overall gait quality and functional mobility. Group Therapeutic Exercise: (   Minutes):  Exercises per grid below to improve mobility, strength, balance and stiffness/soreness. Required moderate visual, verbal, manual and tactile cues to promote proper body alignment, promote proper body posture and promote proper body mechanics. Progressed range, repetitions and complexity of movement as indicated. Date:  4/12/18 Date:   Date:     ACTIVITY/EXERCISE AM PM AM PM AM PM   Ambulation:           Distance  Device  Duration         Seated Heel Raises x15B A        Seated Toe Raises x15B A        Seated Long Arc Quads x15B A        Seated Marching x15B  AA-R, A-L        Seated Hip Abduction x15B  AA-R, A-L                 B = bilateral; AA = active assistive; A = active; P = passive      Braces/Orthotics/Lines/Etc:   · IV  · O2 Device: Room air  Treatment/Session Assessment:    · Response to Treatment:  Tolerated well  · Interdisciplinary Collaboration:   o Physical Therapy Assistant  o Registered Nurse  o Rehabilitation Attendant  · After treatment position/precautions:   o Up in chair  o Bed alarm/tab alert on  o Bed/Chair-wheels locked  o Call light within reach  o RN notified  o Family at bedside   · Compliance with Program/Exercises: Will assess as treatment progresses. · Recommendations/Intent for next treatment session: \"Next visit will focus on advancements to more challenging activities and reduction in assistance provided\".   Total Treatment Duration:  PT Patient Time In/Time Out  Time In: 1326  Time Out: Pablo Fuentes. [address], [place]

## 2018-04-12 NOTE — OP NOTES
Operative Report    Date of Surgery: 4/12/2018       Preoperative Diagnosis: Closed fracture of right hip, initial encounter [facility] [S72.001A]     Postoperative Diagnosis: Right femoral neck fracture     Procedure: Procedure(s):  HIP PERCUTANEOUS PINNING RIGHT/ 737/ CHOICE     Surgeon(s) and Role:     * Nikolas Chowdary MD - Primary     Anesthesia: Spinal     Procedure in Detail:    The patient was brought to the operative suite and placed in the supine position. After adequate anesthesia was achieved, the patient was placed upon the fracture table. The foot holders and peroneal posts were well padded. Right hip was then prepped and draped in the usual sterile fashion after fluoroscopy confirms that her impacted valgus femoral neck fracture was in a stable position. A 3 cm incision is then made over the lateral aspect of the proximal femur. Hemostasis was achieved with Bovie cautery. The guide pin for the cannulated screw was inserted through the lateral cortex beginning superior to the lesser trochanter. AP and lateral fluoroscopic images confirmed its position as it runs up along the calcar into the subchondral bone to the head. Depth gauge was used to determine the appropriate length of the screw and the appropriate length, partially threaded cannulated screw, was passed over the guidewire with excellent purchase. Two additional screws were placed, both superior, one anterior and one posterior. All three screws were parallel in both planes. The wounds were then irrigated with normal saline and closed in layers. Sterile compressive dressings were applied. The patient was then removed from the fracture table and transferred to the recovery room alert and oriented under the care of anesthesia. Estimated Blood Loss:  50 cc    Tourniquet Time: * No tourniquets in log *    Implants/Hardware:   Implant Name Type Inv.  Item Serial No.  Lot No. LRB No. Used Action   SCR BNE WOLF 16MM THRD 6.5X75 --  - [id number]  SCR NIKKO WOLF 16MM THRD 6.5X75 --   [facility] [id number] Right 1 Implanted   SCR NIKKO WOLF 16MM THRD 6.5X80 --  - [id number]  SCR NIKKO WOLF 16MM THRD 6.5X80 --   [facility] [id number] Right 1 Implanted   SCR NIKKO WOLF 16MM THRD 6.5X90 --  - [id number]   SCR NIKKO WOLF 16MM THRD 6.5X90 --    [facility] [id number] Right 1 Implanted        Fluids: See anesthesia record.     Closure: Primary    Complications: None    Signed By: Judi Randle MD              April 12, 2018

## 2018-04-12 NOTE — PROGRESS NOTES
Problem: Mobility Impaired (Adult and Pediatric)  Goal: *Acute Goals and Plan of Care (Insert Text)  STG:  (1.)Ms. Ashley Goel will move from supine to sit and sit to supine , scoot up and down and roll side to side with MODIFIED INDEPENDENCE within 3 treatment day(s). (2.)Ms. Ashley Goel will transfer from bed to chair and chair to bed with MINIMAL ASSIST using the least restrictive device within 3 treatment day(s). (3.)Ms. Ashley Goel will ambulate with MINIMAL ASSIST for 100 feet with the least restrictive device within 3 treatment day(s). (4.)Ms. Ashley Goel will perform LE exercises with 1 to 2 cues for form within 3 days to improve strength for functional transfers and ambulation. LTG:  (1.)Ms. Ashley Goel will move from supine to sit and sit to supine , scoot up and down and roll side to side in bed with INDEPENDENT within 7 treatment day(s). (2.)Ms. Ashley Goel will transfer from bed to chair and chair to bed with CONTACT GUARD ASSIST using the least restrictive device within 7 treatment day(s). (3.)Ms. Ashley Goel will ambulate with CONTACT GUARD ASSIST for 100 feet with the least restrictive device within 7 treatment day(s).   ________________________________________________________________________________________________       PHYSICAL THERAPY: Daily Note, Treatment Day: 1st, AM 4/12/2018  INPATIENT: Hospital Day: 3  Payor: Edgar Hicks / Plan: [address] [place] [address] HMO / Product Type: Managed Care Medicare /      Amarilis BLANTON and CÉSAR HAILE     NAME/AGE/GENDER: Mariel Lam is a 81 y.o. female   PRIMARY DIAGNOSIS: Closed fracture of right hip, [facility] [S72.001A] Hip dislocation, right [facility] Hip dislocation, right (HCC)  Procedure(s) (LRB):  HIP PERCUTANEOUS PINNING RIGHT/ 737/ CHOICE (Right)  1 Day Post-Op  ICD-10: Treatment Diagnosis:    · Generalized Muscle Weakness (M62.81)  · Other abnormalities of gait and mobility (R26.89)   Precaution/Allergies:  Pcn [penicillins] and Sulfa (sulfonamide antibiotics)      ASSESSMENT:     Ms. Melany Collado  is a 81year old female admitted s/p fall and R hip fracture. Patient was sitting up in recliner chair upon contact and agreeable to PT. Patient able to transfer to standing with min assist and cues to maintain NWB status on LUE as well as proper technique (hand placement on RUE). Once standing patient able to perform gait training x 15' with use of bud walker, and below cues in gait section. Patient was later rolled to therapy gym to participate in group therapeutic strengthening exercises to improve functional strength for transfers, gait and overall mobility. Patient requires cues and assistance to perform exercises correctly. Overall slow, steady progress towards physical therapy goals. No goals have been met thus far. Will continue efforts. This section established at most recent assessment   PROBLEM LIST (Impairments causing functional limitations):  1. Decreased Strength  2. Decreased ADL/Functional Activities  3. Decreased Transfer Abilities  4. Decreased Ambulation Ability/Technique  5. Decreased Balance   INTERVENTIONS PLANNED: (Benefits and precautions of physical therapy have been discussed with the patient.)  1. Balance Exercise  2. Bed Mobility  3. Gait Training  4. Therapeutic Activites  5. Therapeutic Exercise/Strengthening  6. Transfer Training  7. Group Therapy     TREATMENT PLAN: Frequency/Duration: twice daily for duration of hospital stay  Rehabilitation Potential For Stated Goals: Good     RECOMMENDED REHABILITATION/EQUIPMENT: (at time of discharge pending progress): Due to the probability of continued deficits (see above) this patient will likely need continued skilled physical therapy after discharge.   Equipment:    To be determined based on patient progress              HISTORY:   History of Present Injury/Illness (Reason for Referral):  Per H&P \" Kitty Perry is a 81 y.o. female who has a PMH of HTN, dyslipidemia, chronic anemia, kidney stones ( unable to take calcium supplements ), who came after she fell at home after she tripped. She hit her right hip and was unable to move after this. Of note, patient had a recent left humeral fracture 2 months ago, which was treated conservatively. Her son was present in the ER room and all questions were answered. Patient denied any dizziness, chest pain, sob, palpitations, dysuria, fever, chills. She was using a cane at home. \"  Past Medical History/Comorbidities:   Ms. Flores  has a past medical history of Anemia, unspecified; Calculus of kidney; Depressive disorder, not elsewhere classified; Diabetes ([facility] Hypertension; Hypocalcemia and hypomagnesemia of ; Hypopotassemia; Hyposmolality and/or hyponatremia; Insomnia, unspecified; Irritable bowel syndrome; Other and unspecified hyperlipidemia (2015); Polyneuropathy in diabetes ([facility] Renal colic; Type II or unspecified type diabetes mellitus without mention of complication, not stated as uncontrolled; and Unspecified essential hypertension. Ms. Flores  has a past surgical history that includes hx cholecystectomy; hx heent; hx appendectomy; hx urological; hx gyn; and hx tonsillectomy.   Social History/Living Environment:   Home Environment: Private residence  # Steps to Enter: 0  Wheelchair Ramp: Yes  One/Two Story Residence: One story  Living Alone: No  Support Systems: Child(forest)  Patient Expects to be Discharged to[de-identified] Rehabilitation facility  Current DME Used/Available at Home: Cane, straight (Use in community)  Tub or Shower Type: Tub/Shower combination (with bench and grab bars)  Prior Level of Function/Work/Activity:  Independent with functional mobility and ADLs     Number of Personal Factors/Comorbidities that affect the Plan of Care: 1-2: MODERATE COMPLEXITY   EXAMINATION:   Most Recent Physical Functioning:   Gross Assessment:                  Posture:     Balance:  Sitting: Intact  Standing: Impaired  Standing - Static: Fair  Standing - Dynamic : Fair Bed Mobility:     Wheelchair Mobility:     Transfers:  Sit to Stand: Minimum assistance  Stand to Sit: Minimum assistance  Gait:  Right Side Weight Bearing: As tolerated  Base of Support: Narrowed  Speed/Maren: Slow;Shuffled  Step Length: Right shortened;Left shortened  Gait Abnormalities: Decreased step clearance;Trunk sway increased; Shuffling gait; Step to gait  Distance (ft): 15 Feet (ft)  Assistive Device: Walker bud  Ambulation - Level of Assistance: Minimal assistance; Moderate assistance  Interventions: Safety awareness training; Tactile cues; Verbal cues;Manual cues      Body Structures Involved:  1. Bones  2. Joints  3. Muscles  4. Ligaments Body Functions Affected:  1. Neuromusculoskeletal  2. Movement Related Activities and Participation Affected:  1. General Tasks and Demands  2. Mobility  3. Self Care  4. Community, Social and [place]   Number of elements that affect the Plan of Care: 4+: HIGH COMPLEXITY   CLINICAL PRESENTATION:   Presentation: Evolving clinical presentation with changing clinical characteristics: MODERATE COMPLEXITY   CLINICAL DECISION MAKIN [facility] Short Form  How much difficulty does the patient currently have. .. Unable A Lot A Little None   1. Turning over in bed (including adjusting bedclothes, sheets and blankets)? [] 1   [] 2   [] 3   [x] 4   2. Sitting down on and standing up from a chair with arms ( e.g., wheelchair, bedside commode, etc.)   [] 1   [x] 2   [] 3   [] 4   3. Moving from lying on back to sitting on the side of the bed? [] 1   [] 2   [x] 3   [] 4   How much help from another person does the patient currently need. .. Total A Lot A Little None   4. Moving to and from a bed to a chair (including a wheelchair)? [] 1   [x] 2   [] 3   [] 4   5. Need to walk in hospital room? [] 1   [] 2   [x] 3   [] 4   6.   Climbing 3-5 steps with a railing? [] 1   [x] 2   [] 3   [] 4   © 2007, Trustees of [address] [place] [address] [address] [address], under license to [employer]. All rights reserved      Score:  Initial: 16 Most Recent: X (Date: -- )    Interpretation of Tool:  Represents activities that are increasingly more difficult (i.e. Bed mobility, Transfers, Gait). Score 24 23 22-20 19-15 14-10 9-7 6     Modifier CH CI CJ CK CL CM CN      ? Mobility - Walking and Moving Around:     - CURRENT STATUS: CK - 40%-59% impaired, limited or restricted    - GOAL STATUS: CJ - 20%-39% impaired, limited or restricted    - D/C STATUS:  ---------------To be determined---------------  Payor: Kathy Arboleda / Plan: [address] [place] [address] HMO / Product Type: Managed Care Medicare /      Medical Necessity:     · Patient demonstrates good rehab potential due to higher previous functional level. Reason for Services/Other Comments:  · Patient continues to require skilled intervention due to decreased transfer and ambulation ability. Use of outcome tool(s) and clinical judgement create a POC that gives a: Clear prediction of patient's progress: LOW COMPLEXITY            TREATMENT:   (In addition to Assessment/Re-Assessment sessions the following treatments were rendered)   Pre-treatment Symptoms/Complaints:  No complaints of pain  Pain: Initial:   Pain Intensity 1: 0  Post Session:  Unchanged, patient comfortable in chair      Gait Training (  13 Minutes):  Gait training to improve and/or restore physical functioning as related to mobility, strength and balance. Ambulated 15 Feet (ft) with Minimal assistance; Moderate assistance using a Walker bud and moderate Safety awareness training; Tactile cues; Verbal cues;Manual cues related to their sequencing, walker manipulation, step length, weight shifts, UE support on bud walker, and foot placement to promote proper body alignment, promote proper body posture and promote proper body mechanics.   Instruction in performance of the above deficits to correct overall gait quality and functional mobility. Group Therapeutic Exercise: (  10 Minutes):  Exercises per grid below to improve mobility, strength, balance and stiffness/soreness. Required moderate visual, verbal, manual and tactile cues to promote proper body alignment, promote proper body posture and promote proper body mechanics. Progressed range, repetitions and complexity of movement as indicated. Date:  4/12/18 Date:   Date:     ACTIVITY/EXERCISE AM PM AM PM AM PM   Ambulation:           Distance  Device  Duration         Seated Heel Raises x15B A        Seated Toe Raises x15B A        Seated Long Arc Quads x15B A        Seated Marching x15B  AA-R, A-L        Seated Hip Abduction x15B  AA-R, A-L                 B = bilateral; AA = active assistive; A = active; P = passive      Braces/Orthotics/Lines/Etc:   · limon catheter  · O2 Device: Room air  Treatment/Session Assessment:    · Response to Treatment:  Tolerated well  · Interdisciplinary Collaboration:   o Physical Therapist  o Occupational Therapist  o Registered Nurse  o SPT  · After treatment position/precautions:   o Up in chair  o Bed alarm/tab alert on  o Bed/Chair-wheels locked  o Call light within reach  o RN notified   · Compliance with Program/Exercises: Will assess as treatment progresses. · Recommendations/Intent for next treatment session: \"Next visit will focus on advancements to more challenging activities and reduction in assistance provided\".   Total Treatment Duration:  PT Patient Time In/Time Out  Time In: 0900 (6737)  Time Out: 0913 (1125)    Nikole Rose PTA

## 2018-04-12 NOTE — PROGRESS NOTES
Hospitalist Progress Note    2018  Admit Date: 4/10/2018  8:56 AM   NAME: Kishan Connell   :  1935   DOS:              18  MRN:  [id number]   Attending: Pilo Watson MD  PCP:  Teresita Temple MD  Treatment Team: Attending Provider: Elle Franklin MD; Utilization Review: Steve Lott RN; Care Manager: Claudell Cargo, RN    No Order     SUBJECTIVE:   As previously documented: \" Ms. Fan López is a 80 yo F with PMHx of  HTN, dyslipidemia, chronic anemia, kidney stones ( unable to take calcium supplements ), who was admitted on 4/10/18 after she  fell at home after she tripped. Right hip x ray showed right femoral neck dislocation and orthopedic surgery was consulted. S/p ORIF R hip 2018. Of note, patient had a recent left humeral fracture 2 months ago, which was treated conservatively. No other falls in the last year. Supposed to use a cane, although Ms. Fan López is suing the cane only when she walks outside. \"        18   Ms. Jamar Madrid Campbelldenies any SOb, CP or abdominal pain. Able to walk with PT today. Son at bedside. Waiting for insurance precertification for STR.             10+ ROS reviewed and negative except for positive in HPI.    Allergies   Allergen Reactions    Pcn [Penicillins] Unknown (comments)    Sulfa (Sulfonamide Antibiotics) Unknown (comments)     Current Facility-Administered Medications   Medication Dose Route Frequency    lactated Ringers infusion  75 mL/hr IntraVENous CONTINUOUS    sodium chloride (NS) flush 5-10 mL  5-10 mL IntraVENous Q8H    sodium chloride (NS) flush 5-10 mL  5-10 mL IntraVENous PRN    oxyCODONE IR (ROXICODONE) tablet 5 mg  5 mg Oral Q4H PRN    ondansetron (ZOFRAN) injection 4 mg  4 mg IntraVENous Q4H PRN    docusate sodium (COLACE) capsule 100 mg  100 mg Oral BID    alum-mag hydroxide-simeth (MYLANTA) oral suspension 30 mL  30 mL Oral Q4H PRN    enoxaparin (LOVENOX) injection 30 mg  30 mg SubCUTAneous Q24H    metoprolol succinate (TOPROL-XL) XL tablet 200 mg  200 mg Oral BID    hydrALAZINE (APRESOLINE) 20 mg/mL injection 10 mg  10 mg IntraVENous Q6H PRN    benazepril (LOTENSIN) tablet 40 mg  40 mg Oral DAILY    hydrALAZINE (APRESOLINE) tablet 50 mg  50 mg Oral TID    verapamil (CALAN) tablet 40 mg  40 mg Oral BID    acetaminophen (TYLENOL) tablet 650 mg  650 mg Oral Q8H    traMADol (ULTRAM) tablet 50 mg  50 mg Oral Q6H PRN    naloxone (NARCAN) injection 0.4 mg  0.4 mg IntraVENous EVERY 2 MINUTES AS NEEDED    cefTRIAXone (ROCEPHIN) 1 g in 0.9% sodium chloride (MBP/ADV) 50 mL  1 g IntraVENous Q24H    insulin lispro (HUMALOG) injection   SubCUTAneous TIDAC    mupirocin calcium (BACTROBAN) 2 % cream   Topical BID         Immunization History   Administered Date(s) Administered    TB Skin Test (PPD) Intradermal 04/10/2018     Objective:     Patient Vitals for the past 24 hrs:   Temp Pulse Resp BP SpO2   18 1129 97.8 °F (36.6 °C) (!) 59 19 175/73 98 %   18 0809 97.9 °F (36.6 °C) 70 20 178/79 96 %   18 0558 98.7 °F (37.1 °C) 68 17 163/61 96 %   18 0150 - - - - (!) 83 %   18 0050 98.2 °F (36.8 °C) 74 18 185/67 90 %   18 2056 97.8 °F (36.6 °C) 64 17 175/74 93 %   18 1615 97.4 °F (36.3 °C) 61 19 151/65 93 %     Temp (24hrs), Av °F (36.7 °C), Min:97.4 °F (36.3 °C), Max:98.7 °F (37.1 °C)    Oxygen Therapy  O2 Sat (%): 98 % (18 1129)  Pulse via Oximetry: 74 beats per minute (18 0050)  O2 Device: Nasal cannula (18)  O2 Flow Rate (L/min): 2 l/min (18)  Oxygen Therapy  O2 Sat (%): 98 % (18)  Pulse via Oximetry: 74 beats per minute (18 0050)  O2 Device: Nasal cannula (18)  O2 Flow Rate (L/min): 2 l/min (18)    Physical Exam:  General:         Alert, cooperative, no acute distress   HEENT:               NCAT. No obvious deformity. Nares normal. No drainage  Lungs:                 Decreased air entry b/l.   No wheezing/rhonchi/rales NC 2L  Cardiovascular:   RRR. No m/r/g. No pedal edema b/l. +2 PT/DT pulses b/l. Abdomen:       S/nt/nd. Bowel sounds normal. .   Skin:         Not Jaundiced  Extremities: R hip with dressing  Neurologic:    AAOx3. No gross focal deficit. Psychiatric:         Good mood. Normal affect. Denies any SI/HI. DIAGNOSTIC STUDIES      Data Review:   Recent Results (from the past 24 hour(s))   GLUCOSE, POC    Collection Time: 04/11/18  4:19 PM   Result Value Ref Range    Glucose (POC) 171 (H) 65 - 100 mg/dL   GLUCOSE, POC    Collection Time: 04/11/18 10:14 PM   Result Value Ref Range    Glucose (POC) 118 (H) 65 - 100 mg/dL   CBC WITH AUTOMATED DIFF    Collection Time: 04/12/18  5:35 AM   Result Value Ref Range    WBC 13.6 (H) 4.3 - 11.1 K/uL    RBC 3.35 (L) 4.05 - 5.25 M/uL    HGB 10.1 (L) 11.7 - 15.4 g/dL    HCT 29.8 (L) 35.8 - 46.3 %    MCV 89.0 79.6 - 97.8 FL    MCH 30.1 26.1 - 32.9 PG    MCHC 33.9 31.4 - 35.0 g/dL    RDW 14.2 11.9 - 14.6 %    PLATELET 957 347 - 101 K/uL    MPV 9.8 (L) 10.8 - 14.1 FL    DF AUTOMATED      NEUTROPHILS 76 43 - 78 %    LYMPHOCYTES 16 13 - 44 %    MONOCYTES 7 4.0 - 12.0 %    EOSINOPHILS 1 0.5 - 7.8 %    BASOPHILS 0 0.0 - 2.0 %    IMMATURE GRANULOCYTES 0 0.0 - 5.0 %    ABS. NEUTROPHILS 10.3 (H) 1.7 - 8.2 K/UL    ABS. LYMPHOCYTES 2.2 0.5 - 4.6 K/UL    ABS. MONOCYTES 1.0 0.1 - 1.3 K/UL    ABS. EOSINOPHILS 0.1 0.0 - 0.8 K/UL    ABS. BASOPHILS 0.0 0.0 - 0.2 K/UL    ABS. IMM.  GRANS. 0.1 0.0 - 0.5 K/UL   GLUCOSE, POC    Collection Time: 04/12/18  8:11 AM   Result Value Ref Range    Glucose (POC) 85 65 - 100 mg/dL   GLUCOSE, POC    Collection Time: 04/12/18 11:32 AM   Result Value Ref Range    Glucose (POC) 109 (H) 65 - 100 mg/dL       All Micro Results     Procedure Component Value Units Date/Time    CULTURE, URINE [[id number]]  (Abnormal) Collected:  04/10/18 1046    Order Status:  Completed Specimen:  Urine from [place] Specimen Updated:  04/12/18 0818 Special Requests: NO SPECIAL REQUESTS        Culture result:         >100,000 COLONIES/mL STREPTOCOCCUS AGALACTIAE SERO GROUP B (A)              THIS ORGANISM WILL BE HELD FOR 7 DAYS. IF FURTHER TESTING IS REQUIRED PLEASE NOTIFY MICROBIOLOGY    MSSA/MRSA SC BY PCR, NASAL SWAB [[id number]]  (Abnormal) Collected:  04/10/18 1050    Order Status:  Completed Specimen:  Nasal Swab Updated:  04/10/18 1411     Special Requests: NO SPECIAL REQUESTS        Culture result:         MRSA target DNA detected, SA target DNA detected. A positive test result does not necessarily indicate the presence of viable organisms. It is however, presumptive for the presence of MRSA or SA. (A)            RESULTS VERIFIED, PHONED TO AND READ BACK BY  SAMARIA BROWN RN AT 0746 ON 4/10/18 SG            Imaging /Procedures /Studies:      Labs and Studies from previous 24 hours have been personally reviewed by myself [facility] as of 4/12/2018  Date Reviewed: 4/10/2018          Codes Class Noted - Resolved POA    * (Principal)Hip dislocation, right ([address] ICD-10-CM: S73.004A  ICD-9-CM: 835.00  4/10/2018 - Present Yes        UTI (urinary tract infection) ICD-10-CM: N39.0  ICD-9-CM: 599.0  4/10/2018 - Present Yes        Mixed hyperlipidemia ICD-10-CM: E78.2  ICD-9-CM: 272.2  9/15/2017 - Present Yes        Type 2 diabetes mellitus with diabetic polyneuropathy, without long-term current use of insulin (HCC) ICD-10-CM: E11.42  ICD-9-CM: 250.60, 357.2  9/15/2017 - Present Yes        Essential hypertension ICD-10-CM: I10  ICD-9-CM: 401.9  Unknown - Present Yes        Anemia ICD-10-CM: D64.9  ICD-9-CM: 285. 9  Unknown - Present Yes              Plan:  - PRN pain medications  - PT/OT - plan for STR  - appreciate orthopedic help   - On IV Rocephin - urine culture with strep - follow full results  - On Verapamil, Benazepril and Toprol. PRN hydralazine  - On ISS.  Hold Metformin  - will need to use a walker to prevent future falls.      DVT Prophylaxis: Lovenox SQ  CODE Status: Full CODE  Plan of Care Discussed with: patient and son at bedside.  Care team.  Medical Risk: moderate  Disposition: pending STR    Jun Diaz MD  04/12/18

## 2018-04-13 VITALS
HEIGHT: 66 IN | RESPIRATION RATE: 19 BRPM | OXYGEN SATURATION: 93 % | TEMPERATURE: 98.1 F | DIASTOLIC BLOOD PRESSURE: 72 MMHG | SYSTOLIC BLOOD PRESSURE: 185 MMHG | HEART RATE: 68 BPM | WEIGHT: 143 LBS | BODY MASS INDEX: 22.98 KG/M2

## 2018-04-13 LAB
ANION GAP SERPL CALC-SCNC: 10 MMOL/L (ref 7–16)
BASOPHILS # BLD: 0 K/UL (ref 0–0.2)
BASOPHILS NFR BLD: 0 % (ref 0–2)
BUN SERPL-MCNC: 5 MG/DL (ref 8–23)
CALCIUM SERPL-MCNC: 8 MG/DL (ref 8.3–10.4)
CHLORIDE SERPL-SCNC: 102 MMOL/L (ref 98–107)
CO2 SERPL-SCNC: 27 MMOL/L (ref 21–32)
CREAT SERPL-MCNC: 0.59 MG/DL (ref 0.6–1)
DIFFERENTIAL METHOD BLD: ABNORMAL
EOSINOPHIL # BLD: 0.2 K/UL (ref 0–0.8)
EOSINOPHIL NFR BLD: 2 % (ref 0.5–7.8)
ERYTHROCYTE [DISTWIDTH] IN BLOOD BY AUTOMATED COUNT: 14.6 % (ref 11.9–14.6)
GLUCOSE BLD STRIP.AUTO-MCNC: 85 MG/DL (ref 65–100)
GLUCOSE BLD STRIP.AUTO-MCNC: 97 MG/DL (ref 65–100)
GLUCOSE SERPL-MCNC: 85 MG/DL (ref 65–100)
HCT VFR BLD AUTO: 26.6 % (ref 35.8–46.3)
HGB BLD-MCNC: 8.8 G/DL (ref 11.7–15.4)
IMM GRANULOCYTES # BLD: 0 K/UL (ref 0–0.5)
IMM GRANULOCYTES NFR BLD AUTO: 0 % (ref 0–5)
LYMPHOCYTES # BLD: 1.7 K/UL (ref 0.5–4.6)
LYMPHOCYTES NFR BLD: 19 % (ref 13–44)
MAGNESIUM SERPL-MCNC: 2.2 MG/DL (ref 1.8–2.4)
MCH RBC QN AUTO: 29.7 PG (ref 26.1–32.9)
MCHC RBC AUTO-ENTMCNC: 33.1 G/DL (ref 31.4–35)
MCV RBC AUTO: 89.9 FL (ref 79.6–97.8)
MM INDURATION POC: 0 MM (ref 0–5)
MONOCYTES # BLD: 0.7 K/UL (ref 0.1–1.3)
MONOCYTES NFR BLD: 8 % (ref 4–12)
NEUTS SEG # BLD: 6.1 K/UL (ref 1.7–8.2)
NEUTS SEG NFR BLD: 71 % (ref 43–78)
PLATELET # BLD AUTO: 289 K/UL (ref 150–450)
PMV BLD AUTO: 9.8 FL (ref 10.8–14.1)
POTASSIUM SERPL-SCNC: 3 MMOL/L (ref 3.5–5.1)
PPD POC: NEGATIVE NEGATIVE
RBC # BLD AUTO: 2.96 M/UL (ref 4.05–5.25)
SODIUM SERPL-SCNC: 139 MMOL/L (ref 136–145)
WBC # BLD AUTO: 8.8 K/UL (ref 4.3–11.1)

## 2018-04-13 PROCEDURE — 97150 GROUP THERAPEUTIC PROCEDURES: CPT

## 2018-04-13 PROCEDURE — 36415 COLL VENOUS BLD VENIPUNCTURE: CPT | Performed by: NURSE PRACTITIONER

## 2018-04-13 PROCEDURE — 74011250637 HC RX REV CODE- 250/637: Performed by: INTERNAL MEDICINE

## 2018-04-13 PROCEDURE — 74011250636 HC RX REV CODE- 250/636: Performed by: INTERNAL MEDICINE

## 2018-04-13 PROCEDURE — 83735 ASSAY OF MAGNESIUM: CPT | Performed by: NURSE PRACTITIONER

## 2018-04-13 PROCEDURE — 80048 BASIC METABOLIC PNL TOTAL CA: CPT | Performed by: NURSE PRACTITIONER

## 2018-04-13 PROCEDURE — 82962 GLUCOSE BLOOD TEST: CPT

## 2018-04-13 PROCEDURE — 85025 COMPLETE CBC W/AUTO DIFF WBC: CPT | Performed by: NURSE PRACTITIONER

## 2018-04-13 PROCEDURE — 97116 GAIT TRAINING THERAPY: CPT

## 2018-04-13 PROCEDURE — 74011250636 HC RX REV CODE- 250/636: Performed by: NURSE PRACTITIONER

## 2018-04-13 PROCEDURE — 74011000258 HC RX REV CODE- 258: Performed by: INTERNAL MEDICINE

## 2018-04-13 PROCEDURE — 74011250637 HC RX REV CODE- 250/637: Performed by: NURSE PRACTITIONER

## 2018-04-13 RX ORDER — INSULIN LISPRO 100 [IU]/ML
INJECTION, SOLUTION INTRAVENOUS; SUBCUTANEOUS
Qty: 1 VIAL | Refills: 0 | Status: SHIPPED
Start: 2018-04-13 | End: 2018-05-10 | Stop reason: SDUPTHER

## 2018-04-13 RX ORDER — POTASSIUM CHLORIDE 20 MEQ/1
40 TABLET, EXTENDED RELEASE ORAL
Status: COMPLETED | OUTPATIENT
Start: 2018-04-13 | End: 2018-04-13

## 2018-04-13 RX ORDER — TRAMADOL HYDROCHLORIDE 50 MG/1
50 TABLET ORAL
Qty: 20 TAB | Refills: 0 | Status: SHIPPED | OUTPATIENT
Start: 2018-04-13 | End: 2018-08-15

## 2018-04-13 RX ORDER — ENOXAPARIN SODIUM 100 MG/ML
30 INJECTION SUBCUTANEOUS EVERY 24 HOURS
Qty: 26 SYRINGE | Refills: 0 | Status: SHIPPED | OUTPATIENT
Start: 2018-04-14 | End: 2018-05-10

## 2018-04-13 RX ORDER — MAG HYDROX/ALUMINUM HYD/SIMETH 200-200-20
30 SUSPENSION, ORAL (FINAL DOSE FORM) ORAL
Qty: 1 BOTTLE | Refills: 0 | Status: SHIPPED | OUTPATIENT
Start: 2018-04-13 | End: 2020-06-04 | Stop reason: ALTCHOICE

## 2018-04-13 RX ORDER — MUPIROCIN CALCIUM 20 MG/G
CREAM TOPICAL
Qty: 15 G | Refills: 0 | Status: ON HOLD | OUTPATIENT
Start: 2018-04-13 | End: 2020-06-19

## 2018-04-13 RX ORDER — DOCUSATE SODIUM 100 MG/1
100 CAPSULE, LIQUID FILLED ORAL 2 TIMES DAILY
Qty: 60 CAP | Refills: 2 | Status: SHIPPED | OUTPATIENT
Start: 2018-04-13 | End: 2018-05-10 | Stop reason: SDUPTHER

## 2018-04-13 RX ORDER — CLONAZEPAM 1 MG/1
0.25 TABLET ORAL
Qty: 15 TAB | Refills: 0 | Status: SHIPPED | OUTPATIENT
Start: 2018-04-13 | End: 2018-05-10 | Stop reason: SDUPTHER

## 2018-04-13 RX ORDER — POTASSIUM CHLORIDE 14.9 MG/ML
20 INJECTION INTRAVENOUS ONCE
Status: COMPLETED | OUTPATIENT
Start: 2018-04-13 | End: 2018-04-13

## 2018-04-13 RX ORDER — OXYCODONE HYDROCHLORIDE 5 MG/1
5 TABLET ORAL
Qty: 15 TAB | Refills: 0 | Status: SHIPPED | OUTPATIENT
Start: 2018-04-13 | End: 2018-08-15

## 2018-04-13 RX ADMIN — VERAPAMIL HYDROCHLORIDE 40 MG: 80 TABLET, FILM COATED ORAL at 08:24

## 2018-04-13 RX ADMIN — ACETAMINOPHEN 650 MG: 325 TABLET ORAL at 12:54

## 2018-04-13 RX ADMIN — MUPIROCIN: 2 CREAM TOPICAL at 08:26

## 2018-04-13 RX ADMIN — METOPROLOL SUCCINATE 200 MG: 100 TABLET, EXTENDED RELEASE ORAL at 08:23

## 2018-04-13 RX ADMIN — ENOXAPARIN SODIUM 30 MG: 30 INJECTION SUBCUTANEOUS at 08:24

## 2018-04-13 RX ADMIN — BENAZEPRIL HYDROCHLORIDE 40 MG: 10 TABLET, FILM COATED ORAL at 08:23

## 2018-04-13 RX ADMIN — Medication 5 ML: at 12:55

## 2018-04-13 RX ADMIN — POTASSIUM CHLORIDE 40 MEQ: 20 TABLET, EXTENDED RELEASE ORAL at 09:18

## 2018-04-13 RX ADMIN — CEFTRIAXONE SODIUM 1 G: 1 INJECTION, POWDER, FOR SOLUTION INTRAMUSCULAR; INTRAVENOUS at 12:50

## 2018-04-13 RX ADMIN — POTASSIUM CHLORIDE 20 MEQ: 14.9 INJECTION, SOLUTION INTRAVENOUS at 09:18

## 2018-04-13 RX ADMIN — HYDRALAZINE HYDROCHLORIDE 50 MG: 50 TABLET, FILM COATED ORAL at 04:49

## 2018-04-13 RX ADMIN — TRAMADOL HYDROCHLORIDE 50 MG: 50 TABLET, FILM COATED ORAL at 12:54

## 2018-04-13 RX ADMIN — HYDRALAZINE HYDROCHLORIDE 50 MG: 50 TABLET, FILM COATED ORAL at 12:55

## 2018-04-13 RX ADMIN — Medication 10 ML: at 04:50

## 2018-04-13 RX ADMIN — ACETAMINOPHEN 650 MG: 325 TABLET ORAL at 04:49

## 2018-04-13 NOTE — PROGRESS NOTES
Approved for STR by Tonia. Patient scheduled for transport today at  to [facility] via stretcher by [facility]. Patient and son aware. Care Management Interventions  PCP Verified by CM:  Yes  Mode of Transport at Discharge: BLS  Transition of Care Consult (CM Consult): Discharge Planning  Physical Therapy Consult: Yes  Occupational Therapy Consult: Yes  Current Support Network: Relative's Home  Confirm Follow Up Transport: Family  Plan discussed with Pt/Family/Caregiver: Yes  Freedom of Choice Offered: Yes  Discharge Location  Discharge Placement:  ([facility])

## 2018-04-13 NOTE — PROGRESS NOTES
Problem: Self Care Deficits Care Plan (Adult)  Goal: *Acute Goals and Plan of Care (Insert Text)  1. Patient will maintain WBAT status in RLE for 100% of treatment session and no verbal cues from therapist.   2. Patient will complete functional transfers with SBA while maintaining WBAT status in RLE and with adaptive equipment as needed. 3. Patient will complete lower body bathing and dressing with SBA and adaptive equipment as needed. 4. Patient will complete toileting and toilet transfer with SBA. 5. Patient will tolerate 23 minutes of OT treatment with less than 4 rest breaks to increase activity tolerance for ADLs. 6. Patient will participate in at least 23 minutes of BUE therapeutic exercises to strengthen BUE for functional transfers. Timeframe: 7 visits     Comments:     OCCUPATIONAL THERAPY: Daily Note, Treatment Day: 2nd and AM    4/13/2018  INPATIENT: Hospital Day: 4  Payor: Cammy Hurt / Plan: [address] [place] [address] HMO / Product Type: Managed Care Medicare /      NAME/AGE/GENDER: Alberto Abreu is a 81 y.o. female   PRIMARY DIAGNOSIS:  Closed fracture of right hip, initial encounter ([facility] [address] [S72.001A] Hip dislocation, right ([address] Hip dislocation, right ([address]  Procedure(s) (LRB):  HIP PERCUTANEOUS PINNING RIGHT/ 737/ CHOICE (Right)  2 Days Post-Op  ICD-10: Treatment Diagnosis:    · Pain in Right Hip (M25.551)  · Stiffness of Right Hip, Not elsewhere classified (M25.651)   Precautions/Allergies:    RLE WBAT, LUE with non-surgical proximal humerus fx-checking on WB status with MD Herrera [penicillins] and Sulfa (sulfonamide antibiotics)      ASSESSMENT:     Ms. Devine Began Pt was transferred down to the gym in her recliner chair to participate in group exercises to increase strength for mobility and ADL's. Pt required visual, verbal, and manual cues to complete exercises with proper form. Pt participated with good effort. Continue POC.      This section established at most recent assessment PROBLEM LIST (Impairments causing functional limitations):  1. Decreased Strength  2. Decreased ADL/Functional Activities  3. Decreased Transfer Abilities  4. Decreased Ambulation Ability/Technique  5. Decreased Balance  6. Increased Pain  7. Decreased Activity Tolerance  8. Decreased Knowledge of Precautions   INTERVENTIONS PLANNED: (Benefits and precautions of occupational therapy have been discussed with the patient.)  1. Activities of daily living training  2. Adaptive equipment training  3. Balance training  4. Clothing management  5. Donning&doffing training  6. Group therapy  7. Therapeutic activity  8. Therapeutic exercise     TREATMENT PLAN: Frequency/Duration: Follow patient 6x/week to address above goals. Rehabilitation Potential For Stated Goals: Good     RECOMMENDED REHABILITATION/EQUIPMENT: (at time of discharge pending progress): Due to the probability of continued deficits (see above) this patient will likely need continued skilled occupational therapy after discharge. Equipment:    None at this time              OCCUPATIONAL PROFILE AND HISTORY:   History of Present Injury/Illness (Reason for Referral):  See H&P  Past Medical History/Comorbidities:   Ms. Franklin Cadena  has a past medical history of Anemia, unspecified; Calculus of kidney; Depressive disorder, not elsewhere classified; Diabetes ([address] Hypertension; Hypocalcemia and hypomagnesemia of ; Hypopotassemia; Hyposmolality and/or hyponatremia; Insomnia, unspecified; Irritable bowel syndrome; Other and unspecified hyperlipidemia (2015); Polyneuropathy in diabetes ([address] Renal colic; Type II or unspecified type diabetes mellitus without mention of complication, not stated as uncontrolled; and Unspecified essential hypertension. Ms. Franklin Cadena  has a past surgical history that includes hx cholecystectomy; hx heent; hx appendectomy; hx urological; hx gyn; and hx tonsillectomy.   Social History/Living Environment:   Home Environment: Private residence  # Steps to Enter: 0  Wheelchair Ramp: Yes  One/Two Story Residence: One story  Living Alone: No  Support Systems: Child(forest)  Patient Expects to be Discharged to[de-identified] Rehabilitation facility  Current DME Used/Available at Home: Cane, straight (Use in community)  Tub or Shower Type: Tub/Shower combination (with bench and grab bars)  Prior Level of Function/Work/Activity:  [place] to mod I with ADLs  Personal Factors:          Sex:  female        Age:  81 y.o.    Number of Personal Factors/Comorbidities that affect the Plan of Care: Expanded review of therapy/medical records (1-2):  MODERATE COMPLEXITY   ASSESSMENT OF OCCUPATIONAL PERFORMANCE[de-identified]   Activities of Daily Living:           Basic ADLs (From Assessment) Complex ADLs (From Assessment)   Basic ADL  Feeding: Setup  Oral Facial Hygiene/Grooming: Setup  Bathing: Minimum assistance  Type of Bath: Chlorhexidine (CHG), Bath Pack  Upper Body Dressing: Setup  Lower Body Dressing: Minimum assistance  Toileting: Minimum assistance Instrumental ADL  Meal Preparation: Moderate assistance  Homemaking: Maximum assistance  Medication Management: Modified independent  Financial Management: Modified independent   Grooming/Bathing/Dressing Activities of Daily Living                             Bed/Mat Mobility  Supine to Sit: Minimum assistance  Sit to Stand: Minimum assistance       Most Recent Physical Functioning:   Gross Assessment:                  Posture:     Balance:  Sitting: Intact  Standing: Impaired  Standing - Static: Fair  Standing - Dynamic : Fair Bed Mobility:  Supine to Sit: Minimum assistance  Wheelchair Mobility:     Transfers:  Sit to Stand: Minimum assistance                Patient Vitals for the past 6 hrs:   BP BP Patient Position SpO2 O2 Flow Rate (L/min) Pulse   04/13/18 0805 182/72 At rest 90 % - 65   04/13/18 1112 185/72 Sitting 93 % 1.5 l/min 68       Mental Status  Neurologic State: Alert  Orientation Level: Oriented X4  Cognition: Decreased attention/concentration, Follows commands  Perception: Verbal, Tactile  Perseveration: Tactile cues provided, Verbal cues provided  Safety/Judgement: Fall prevention                          Physical Skills Involved:  1. Balance  2. Strength  3. Activity Tolerance  4. Pain (acute) Cognitive Skills Affected (resulting in the inability to perform in a timely and safe manner): 1. none Psychosocial Skills Affected:  1. Habits/Routines  2. Environmental Adaptation  3. Social Roles   Number of elements that affect the Plan of Care: 5+:  HIGH COMPLEXITY   CLINICAL DECISION MAKIN[address] [place] [address] AM-PAC 6 Clicks   Daily Activity Inpatient Short Form  How much help from another person does the patient currently need. .. Total A Lot A Little None   1. Putting on and taking off regular lower body clothing? [] 1   [] 2   [x] 3   [] 4   2. Bathing (including washing, rinsing, drying)? [] 1   [] 2   [x] 3   [] 4   3. Toileting, which includes using toilet, bedpan or urinal?   [] 1   [] 2   [x] 3   [] 4   4. Putting on and taking off regular upper body clothing? [] 1   [] 2   [] 3   [x] 4   5. Taking care of personal grooming such as brushing teeth? [] 1   [] 2   [] 3   [x] 4   6. Eating meals? [] 1   [] 2   [] 3   [x] 4   © , Trustees of [address] [place] [address], under license to [employer]. All rights reserved      Score:  Initial: 21 Most Recent: X (Date: -- )    Interpretation of Tool:  Represents activities that are increasingly more difficult (i.e. Bed mobility, Transfers, Gait). Score 24 23 22-20 19-15 14-10 9-7 6     Modifier CH CI CJ CK CL CM CN      ?  Self Care:     - CURRENT STATUS: CJ - 20%-39% impaired, limited or restricted    - GOAL STATUS: CI - 1%-19% impaired, limited or restricted    - D/C STATUS:  ---------------To be determined---------------  Payor: Geri Pierson / Plan: [address] [place] [address] HMO / Product Type: Managed Care Medicare / Medical Necessity:     · Patient is expected to demonstrate progress in strength, balance and functional technique to increase independence with ADLs. Reason for Services/Other Comments:  · Patient continues to require modification of therapeutic interventions to increase complexity of exercises. Use of outcome tool(s) and clinical judgement create a POC that gives a: LOW COMPLEXITY         TREATMENT:   (In addition to Assessment/Re-Assessment sessions the following treatments were rendered)     Pre-treatment Symptoms/Complaints:    Pain: Initial:   Pain Intensity 1: 0  Post Session:  same   Group Therapeutic Exercise: (10 minutes):  Exercises per grid below to improve mobility and strength. Required minimal visual and verbal cues to promote proper body posture and promote proper body mechanics. Progressed resistance and range as indicated. Date:  4/13/18 Date:   Date:     Activity/Exercise Parameters Parameters Parameters   Shoulder flexion/extension 15 reps with 3# weight     Shoulder horizontal add/abb 15 reps with red theraband     Punches 15 reps with 3# weight     Elbow flexion/extension 15 reps with 3# weight     Tricep extension      Balloon tapping      Ball toss            Braces/Orthotics/Lines/Etc:   · 02   · IV  Treatment/Session Assessment:    · Response to Treatment:  Progressing well   · Interdisciplinary Collaboration:   o Certified Occupational Therapy Assistant  o Registered Nurse  · After treatment position/precautions:   o Up in chair  o Bed alarm/tab alert on  o Bed/Chair-wheels locked  o Bed in low position  o Call light within reach  o RN notified   · Compliance with Program/Exercises: compliant all of the time. · Recommendations/Intent for next treatment session: \"Next visit will focus on advancements to more challenging activities and reduction in assistance provided\".   Total Treatment Duration:  OT Patient Time In/Time Out  Time In: 1120  Time Out: 7[address] Jeff Cantu, NERI

## 2018-04-13 NOTE — PROGRESS NOTES
TRANSFER - OUT REPORT:    Verbal report given to Jennifer MULLER(name) on Cleo Burkett  being transferred to [facility]) for routine progression of care       Report consisted of patients Situation, Background, Assessment and   Recommendations(SBAR). Information from the following report(s) SBAR, Kardex, OR Summary, Intake/Output, MAR, Accordion and Recent Results was reviewed with the receiving nurse. Lines:       Opportunity for questions and clarification was provided.       Patient transported with:   [employer]

## 2018-04-13 NOTE — PROGRESS NOTES
Problem: Mobility Impaired (Adult and Pediatric)  Goal: *Acute Goals and Plan of Care (Insert Text)  STG:  (1.)Ms. Alexa Kim will move from supine to sit and sit to supine , scoot up and down and roll side to side with MODIFIED INDEPENDENCE within 3 treatment day(s). (2.)Ms. Alexa Kim will transfer from bed to chair and chair to bed with MINIMAL ASSIST using the least restrictive device within 3 treatment day(s). (3.)Ms. Alexa Kim will ambulate with MINIMAL ASSIST for 100 feet with the least restrictive device within 3 treatment day(s). (4.)Ms. Alexa Kim will perform LE exercises with 1 to 2 cues for form within 3 days to improve strength for functional transfers and ambulation. LTG:  (1.)Ms. Alexa Kim will move from supine to sit and sit to supine , scoot up and down and roll side to side in bed with INDEPENDENT within 7 treatment day(s). (2.)Ms. Alexa Kim will transfer from bed to chair and chair to bed with CONTACT GUARD ASSIST using the least restrictive device within 7 treatment day(s). (3.)Ms. Alexa Kim will ambulate with CONTACT GUARD ASSIST for 100 feet with the least restrictive device within 7 treatment day(s).   ________________________________________________________________________________________________       PHYSICAL THERAPY: Daily Note, Treatment Day: 2nd, AM 4/13/2018  INPATIENT: Hospital Day: 4  Payor: Chip Copeland / Plan: [address] [place] [address] HMO / Product Type: Managed Care Medicare /      Mame BLANTON and CÉSAR HAILE     NAME/AGE/GENDER: Juan Daniel Bermeo is a 81 y.o. female   PRIMARY DIAGNOSIS: Closed fracture of right hip, initial encounter ([facility] [address] [S72.001A] Hip dislocation, right ([facility] [address] Hip dislocation, right ([facility])  Procedure(s) (LRB):  HIP PERCUTANEOUS PINNING RIGHT/ 737/ CHOICE (Right)  2 Days Post-Op  ICD-10: Treatment Diagnosis:    · Generalized Muscle Weakness (M62.81)  · Other abnormalities of gait and mobility (R26.89)   Precaution/Allergies:  Pcn [penicillins] and Sulfa (sulfonamide antibiotics)      ASSESSMENT:     Ms. Milton Lopez  is a 81year old female admitted s/p fall and R hip fracture. Patient was supine upon contact and agreeable to PT. Patient able to perform supine to sit and transfer to standing with mod assist and cues to maintain NWB status on LUE as well as proper technique (hand placement on RUE). Once standing patient able to perform gait training x 15' with use of bud walker, and below cues in gait section. Patient was later rolled to therapy gym to participate in group therapeutic strengthening exercises to improve functional strength for transfers, gait and overall mobility. Patient requires cues and assistance to perform exercises correctly. Overall slow, steady progress towards physical therapy goals. No goals have been met thus far. Will continue efforts. This section established at most recent assessment   PROBLEM LIST (Impairments causing functional limitations):  1. Decreased Strength  2. Decreased ADL/Functional Activities  3. Decreased Transfer Abilities  4. Decreased Ambulation Ability/Technique  5. Decreased Balance   INTERVENTIONS PLANNED: (Benefits and precautions of physical therapy have been discussed with the patient.)  1. Balance Exercise  2. Bed Mobility  3. Gait Training  4. Therapeutic Activites  5. Therapeutic Exercise/Strengthening  6. Transfer Training  7. Group Therapy     TREATMENT PLAN: Frequency/Duration: twice daily for duration of hospital stay  Rehabilitation Potential For Stated Goals: Good     RECOMMENDED REHABILITATION/EQUIPMENT: (at time of discharge pending progress): Due to the probability of continued deficits (see above) this patient will likely need continued skilled physical therapy after discharge.   Equipment:    To be determined based on patient progress              HISTORY:   History of Present Injury/Illness (Reason for Referral):  Per H&P \" Maryam Turpin is a 81 y.o. female who has a PMH of HTN, dyslipidemia, chronic anemia, kidney stones ( unable to take calcium supplements ), who came after she fell at home after she tripped. She hit her right hip and was unable to move after this. Of note, patient had a recent left humeral fracture 2 months ago, which was treated conservatively. Her son was present in the ER room and all questions were answered. Patient denied any dizziness, chest pain, sob, palpitations, dysuria, fever, chills. She was using a cane at home. \"  Past Medical History/Comorbidities:   Ms. Dorothea Toledo  has a past medical history of Anemia, unspecified; Calculus of kidney; Depressive disorder, not elsewhere classified; Diabetes ([facility] [address] Hypertension; Hypocalcemia and hypomagnesemia of ; Hypopotassemia; Hyposmolality and/or hyponatremia; Insomnia, unspecified; Irritable bowel syndrome; Other and unspecified hyperlipidemia (2015); Polyneuropathy in diabetes ([facility] [address] Renal colic; Type II or unspecified type diabetes mellitus without mention of complication, not stated as uncontrolled; and Unspecified essential hypertension. Ms. Dorothea Toledo  has a past surgical history that includes hx cholecystectomy; hx heent; hx appendectomy; hx urological; hx gyn; and hx tonsillectomy.   Social History/Living Environment:   Home Environment: Private residence  # Steps to Enter: 0  Wheelchair Ramp: Yes  One/Two Story Residence: One story  Living Alone: No  Support Systems: Child(forest)  Patient Expects to be Discharged to[de-identified] Rehabilitation facility  Current DME Used/Available at Home: Cane, straight (Use in community)  Tub or Shower Type: Tub/Shower combination (with bench and grab bars)  Prior Level of Function/Work/Activity:  Independent with functional mobility and ADLs     Number of Personal Factors/Comorbidities that affect the Plan of Care: 1-2: MODERATE COMPLEXITY   EXAMINATION:   Most Recent Physical Functioning:   Gross Assessment:                  Posture:     Balance:  Sitting: Intact  Standing: Impaired  Standing - Static: Fair  Standing - Dynamic : Fair Bed Mobility:  Supine to Sit: Minimum assistance  Wheelchair Mobility:     Transfers:  Sit to Stand: Minimum assistance  Gait:  Right Side Weight Bearing: As tolerated  Base of Support: Narrowed  Speed/Maren: Slow;Shuffled  Step Length: Left shortened;Right shortened  Gait Abnormalities: Decreased step clearance;Trunk sway increased; Shuffling gait; Step to gait  Distance (ft): 15 Feet (ft)  Assistive Device: Walker bud  Ambulation - Level of Assistance: Minimal assistance  Interventions: Safety awareness training; Tactile cues; Verbal cues      Body Structures Involved:  1. Bones  2. Joints  3. Muscles  4. Ligaments Body Functions Affected:  1. Neuromusculoskeletal  2. Movement Related Activities and Participation Affected:  1. General Tasks and Demands  2. Mobility  3. Self Care  4. Community, Social and Melfa Dover   Number of elements that affect the Plan of Care: 4+: HIGH COMPLEXITY   CLINICAL PRESENTATION:   Presentation: Evolving clinical presentation with changing clinical characteristics: MODERATE COMPLEXITY   CLINICAL DECISION MAKIN [facility] Inpatient Short Form  How much difficulty does the patient currently have. .. Unable A Lot A Little None   1. Turning over in bed (including adjusting bedclothes, sheets and blankets)? [] 1   [] 2   [] 3   [x] 4   2. Sitting down on and standing up from a chair with arms ( e.g., wheelchair, bedside commode, etc.)   [] 1   [x] 2   [] 3   [] 4   3. Moving from lying on back to sitting on the side of the bed? [] 1   [] 2   [x] 3   [] 4   How much help from another person does the patient currently need. .. Total A Lot A Little None   4. Moving to and from a bed to a chair (including a wheelchair)? [] 1   [x] 2   [] 3   [] 4   5. Need to walk in hospital room? [] 1   [] 2   [x] 3   [] 4   6. Climbing 3-5 steps with a railing?    [] 1   [x] 2   [] 3   [] 4 © 2007, Trustees of [address] [place] [address] [address] [address], under license to [employer]. All rights reserved      Score:  Initial: 16 Most Recent: X (Date: -- )    Interpretation of Tool:  Represents activities that are increasingly more difficult (i.e. Bed mobility, Transfers, Gait). Score 24 23 22-20 19-15 14-10 9-7 6     Modifier CH CI CJ CK CL CM CN      ? Mobility - Walking and Moving Around:     - CURRENT STATUS: CK - 40%-59% impaired, limited or restricted    - GOAL STATUS: CJ - 20%-39% impaired, limited or restricted    - D/C STATUS:  ---------------To be determined---------------  Payor: Bob Woodruff / Plan: [address] [place] [address] HMO / Product Type: [employer] Medicare /      Medical Necessity:     · Patient demonstrates good rehab potential due to higher previous functional level. Reason for Services/Other Comments:  · Patient continues to require skilled intervention due to decreased transfer and ambulation ability. Use of outcome tool(s) and clinical judgement create a POC that gives a: Clear prediction of patient's progress: LOW COMPLEXITY            TREATMENT:   (In addition to Assessment/Re-Assessment sessions the following treatments were rendered)   Pre-treatment Symptoms/Complaints:  No complaints of pain  Pain: Initial:   Pain Intensity 1: 0  Post Session:  Unchanged, patient comfortable in chair      Gait Training (  13 Minutes):  Gait training to improve and/or restore physical functioning as related to mobility, strength and balance. Ambulated 15 Feet (ft) with Minimal assistance using a Walker bud and moderate Safety awareness training; Tactile cues; Verbal cues related to their sequencing, walker manipulation, step length, weight shifts, UE support on bud walker, and foot placement to promote proper body alignment, promote proper body posture and promote proper body mechanics.   Instruction in performance of the above deficits to correct overall gait quality and functional mobility. Group Therapeutic Exercise: (  10 Minutes):  Exercises per grid below to improve mobility, strength, balance and stiffness/soreness. Required moderate visual, verbal, manual and tactile cues to promote proper body alignment, promote proper body posture and promote proper body mechanics. Progressed range, repetitions and complexity of movement as indicated. Date:  4/12/18 Date:  4/13/18 Date:     ACTIVITY/EXERCISE AM PM AM PM AM PM   Ambulation:           Distance  Device  Duration         Seated Heel Raises x15B A  x15B A      Seated Toe Raises x15B A  x15B A      Seated Long Arc Quads x15B A  x15B A      Seated Marching x15B  AA-R, A-L  x15B  AA-R, A-L      Seated Hip Abduction x15B  AA-R, A-L  x15B  AA-R, A-L               B = bilateral; AA = active assistive; A = active; P = passive      Braces/Orthotics/Lines/Etc:   · O2 Device: Room air  Treatment/Session Assessment:    · Response to Treatment:  Tolerated well  · Interdisciplinary Collaboration:   o Physical Therapy Assistant  o Registered Nurse  · After treatment position/precautions:   o Up in chair  o Bed alarm/tab alert on  o Bed/Chair-wheels locked  o Call light within reach  o RN notified  o Family at bedside   · Compliance with Program/Exercises: Will assess as treatment progresses. · Recommendations/Intent for next treatment session: \"Next visit will focus on advancements to more challenging activities and reduction in assistance provided\".   Total Treatment Duration:  PT Patient Time In/Time Out  Time In: 0846 (1130)  Time Out: 0903 (1140)    Pavel Rose PTA

## 2018-04-13 NOTE — PROGRESS NOTES
Problem: Mobility Impaired (Adult and Pediatric)  Goal: *Acute Goals and Plan of Care (Insert Text)  STG:  (1.)Ms. Arron Day will move from supine to sit and sit to supine , scoot up and down and roll side to side with MODIFIED INDEPENDENCE within 3 treatment day(s). (2.)Ms. Arron Day will transfer from bed to chair and chair to bed with MINIMAL ASSIST using the least restrictive device within 3 treatment day(s). (3.)Ms. Arron Day will ambulate with MINIMAL ASSIST for 100 feet with the least restrictive device within 3 treatment day(s). (4.)Ms. Arron Day will perform LE exercises with 1 to 2 cues for form within 3 days to improve strength for functional transfers and ambulation. LTG:  (1.)Ms. Arron Day will move from supine to sit and sit to supine , scoot up and down and roll side to side in bed with INDEPENDENT within 7 treatment day(s). (2.)Ms. Arron Day will transfer from bed to chair and chair to bed with CONTACT GUARD ASSIST using the least restrictive device within 7 treatment day(s). (3.)Ms. Arron Day will ambulate with CONTACT GUARD ASSIST for 100 feet with the least restrictive device within 7 treatment day(s).   ________________________________________________________________________________________________       PHYSICAL THERAPY: Daily Note, Treatment Day: 2nd, PM 4/13/2018  INPATIENT: Hospital Day: 4  Payor: Kusum Nunez / Plan: [address] [place] [address] HMO / Product Type: Managed Care Medicare /      Adalgisa BLANTON and CÉSAR HAILE     NAME/AGE/GENDER: Debbi Prader is a 81 y.o. female   PRIMARY DIAGNOSIS: Closed fracture of right hip, initial encounter ([facility] [S72.001A] Hip dislocation, right ([facility] Hip dislocation, right (HCC)  Procedure(s) (LRB):  HIP PERCUTANEOUS PINNING RIGHT/ 737/ CHOICE (Right)  2 Days Post-Op  ICD-10: Treatment Diagnosis:    · Generalized Muscle Weakness (M62.81)  · Other abnormalities of gait and mobility (R26.89)   Precaution/Allergies:  Pcn [penicillins] and Sulfa (sulfonamide antibiotics)      ASSESSMENT:     Ms. Dorothea Toledo  is a 81year old female admitted s/p fall and R hip fracture. Patient was sitting up in recliner chair upon contact and agreeable to PT. Patient able to perform  transfer to standing with mod assist and cues to maintain NWB status on LUE as well as proper technique (hand placement on RUE). Once standing patient able to perform gait training x 15' with use of bud walker, and below cues in gait section. Overall slow, steady progress towards physical therapy goals. No goals have been met thus far. Will continue efforts. This section established at most recent assessment   PROBLEM LIST (Impairments causing functional limitations):  1. Decreased Strength  2. Decreased ADL/Functional Activities  3. Decreased Transfer Abilities  4. Decreased Ambulation Ability/Technique  5. Decreased Balance   INTERVENTIONS PLANNED: (Benefits and precautions of physical therapy have been discussed with the patient.)  1. Balance Exercise  2. Bed Mobility  3. Gait Training  4. Therapeutic Activites  5. Therapeutic Exercise/Strengthening  6. Transfer Training  7. Group Therapy     TREATMENT PLAN: Frequency/Duration: twice daily for duration of hospital stay  Rehabilitation Potential For Stated Goals: Good     RECOMMENDED REHABILITATION/EQUIPMENT: (at time of discharge pending progress): Due to the probability of continued deficits (see above) this patient will likely need continued skilled physical therapy after discharge. Equipment:    To be determined based on patient progress              HISTORY:   History of Present Injury/Illness (Reason for Referral):  Per H&P \" Scott Johnson is a 81 y.o. female who has a PMH of HTN, dyslipidemia, chronic anemia, kidney stones ( unable to take calcium supplements ), who came after she fell at home after she tripped. She hit her right hip and was unable to move after this.  Of note, patient had a recent left humeral fracture 2 months ago, which was treated conservatively. Her son was present in the ER room and all questions were answered. Patient denied any dizziness, chest pain, sob, palpitations, dysuria, fever, chills. She was using a cane at home. \"  Past Medical History/Comorbidities:   Ms. Breanna Vines  has a past medical history of Anemia, unspecified; Calculus of kidney; Depressive disorder, not elsewhere classified; Diabetes ([facility] Hypertension; Hypocalcemia and hypomagnesemia of ; Hypopotassemia; Hyposmolality and/or hyponatremia; Insomnia, unspecified; Irritable bowel syndrome; Other and unspecified hyperlipidemia (2015); Polyneuropathy in diabetes ([facility] Renal colic; Type II or unspecified type diabetes mellitus without mention of complication, not stated as uncontrolled; and Unspecified essential hypertension. Ms. Breanna Vines  has a past surgical history that includes hx cholecystectomy; hx heent; hx appendectomy; hx urological; hx gyn; and hx tonsillectomy.   Social History/Living Environment:   Home Environment: Private residence  # Steps to Enter: 0  Wheelchair Ramp: Yes  One/Two Story Residence: One story  Living Alone: No  Support Systems: Child(forest)  Patient Expects to be Discharged to[de-identified] Rehabilitation facility  Current DME Used/Available at Home: Cane, straight (Use in community)  Tub or Shower Type: Tub/Shower combination (with bench and grab bars)  Prior Level of Function/Work/Activity:  Independent with functional mobility and ADLs     Number of Personal Factors/Comorbidities that affect the Plan of Care: 1-2: MODERATE COMPLEXITY   EXAMINATION:   Most Recent Physical Functioning:   Gross Assessment:                  Posture:     Balance:  Sitting: Intact  Standing: Impaired  Standing - Static: Fair  Standing - Dynamic : Fair Bed Mobility:  Supine to Sit: Minimum assistance  Wheelchair Mobility:     Transfers:  Sit to Stand: Minimum assistance  Gait:  Right Side Weight Bearing: As tolerated  Base of Support: Narrowed  Speed/Maren: Slow;Shuffled  Step Length: Left shortened;Right shortened  Gait Abnormalities: Decreased step clearance;Trunk sway increased; Shuffling gait; Step to gait  Distance (ft): 15 Feet (ft)  Assistive Device: Walker bud  Ambulation - Level of Assistance: Minimal assistance  Interventions: Safety awareness training; Tactile cues; Verbal cues      Body Structures Involved:  1. Bones  2. Joints  3. Muscles  4. Ligaments Body Functions Affected:  1. Neuromusculoskeletal  2. Movement Related Activities and Participation Affected:  1. General Tasks and Demands  2. Mobility  3. Self Care  4. Community, Social and Jackson Josephine   Number of elements that affect the Plan of Care: 4+: HIGH COMPLEXITY   CLINICAL PRESENTATION:   Presentation: Evolving clinical presentation with changing clinical characteristics: MODERATE COMPLEXITY   CLINICAL DECISION MAKIN [facility] Short Form  How much difficulty does the patient currently have. .. Unable A Lot A Little None   1. Turning over in bed (including adjusting bedclothes, sheets and blankets)? [] 1   [] 2   [] 3   [x] 4   2. Sitting down on and standing up from a chair with arms ( e.g., wheelchair, bedside commode, etc.)   [] 1   [x] 2   [] 3   [] 4   3. Moving from lying on back to sitting on the side of the bed? [] 1   [] 2   [x] 3   [] 4   How much help from another person does the patient currently need. .. Total A Lot A Little None   4. Moving to and from a bed to a chair (including a wheelchair)? [] 1   [x] 2   [] 3   [] 4   5. Need to walk in hospital room? [] 1   [] 2   [x] 3   [] 4   6. Climbing 3-5 steps with a railing? [] 1   [x] 2   [] 3   [] 4   © , Trustees of [address] [place] [address] [address] [address], under license to [employer].  All rights reserved      Score:  Initial: 16 Most Recent: X (Date: -- )    Interpretation of Tool:  Represents activities that are increasingly more difficult (i.e. Bed mobility, Transfers, Gait). Score 24 23 22-20 19-15 14-10 9-7 6     Modifier CH CI CJ CK CL CM CN      ? Mobility - Walking and Moving Around:     - CURRENT STATUS: CK - 40%-59% impaired, limited or restricted    - GOAL STATUS: CJ - 20%-39% impaired, limited or restricted    - D/C STATUS:  ---------------To be determined---------------  Payor: Michelle Crooks / Plan: [address] [place] [address] HMO / Product Type: Managed Care Medicare /      Medical Necessity:     · Patient demonstrates good rehab potential due to higher previous functional level. Reason for Services/Other Comments:  · Patient continues to require skilled intervention due to decreased transfer and ambulation ability. Use of outcome tool(s) and clinical judgement create a POC that gives a: Clear prediction of patient's progress: LOW COMPLEXITY            TREATMENT:   (In addition to Assessment/Re-Assessment sessions the following treatments were rendered)   Pre-treatment Symptoms/Complaints:  No complaints of pain  Pain: Initial:   Pain Intensity 1: 0  Post Session:  Unchanged, patient comfortable in chair      Gait Training (  10 Minutes):  Gait training to improve and/or restore physical functioning as related to mobility, strength and balance. Ambulated 15 Feet (ft) with Minimal assistance using a Walker bud and moderate Safety awareness training; Tactile cues; Verbal cues related to their sequencing, walker manipulation, step length, weight shifts, UE support on bud walker, and foot placement to promote proper body alignment, promote proper body posture and promote proper body mechanics. Instruction in performance of the above deficits to correct overall gait quality and functional mobility. Group Therapeutic Exercise: (   Minutes):  Exercises per grid below to improve mobility, strength, balance and stiffness/soreness.   Required moderate visual, verbal, manual and tactile cues to promote proper body alignment, promote proper body posture and promote proper body mechanics. Progressed range, repetitions and complexity of movement as indicated. Date:  4/12/18 Date:  4/13/18 Date:     ACTIVITY/EXERCISE AM PM AM PM AM PM   Ambulation:           Distance  Device  Duration         Seated Heel Raises x15B A  x15B A      Seated Toe Raises x15B A  x15B A      Seated Long Arc Quads x15B A  x15B A      Seated Marching x15B  AA-R, A-L  x15B  AA-R, A-L      Seated Hip Abduction x15B  AA-R, A-L  x15B  AA-R, A-L               B = bilateral; AA = active assistive; A = active; P = passive      Braces/Orthotics/Lines/Etc:   · O2 Device: Room air  Treatment/Session Assessment:    · Response to Treatment:  Tolerated well  · Interdisciplinary Collaboration:   o Physical Therapy Assistant  o Registered Nurse  · After treatment position/precautions:   o Up in chair  o Bed alarm/tab alert on  o Bed/Chair-wheels locked  o Call light within reach  o RN notified  o Family at bedside   · Compliance with Program/Exercises: Will assess as treatment progresses. · Recommendations/Intent for next treatment session: \"Next visit will focus on advancements to more challenging activities and reduction in assistance provided\".   Total Treatment Duration:  PT Patient Time In/Time Out  Time In: 9476  Time Out: [facility]

## 2018-04-13 NOTE — PROGRESS NOTES
ORTH FRACTURE PROGRESS NOTE    2018  Admit Date:   4/10/2018    Post Op day: 1 Days Post-Op    Subjective:    Alberto Abreu PATIENT RESTING IN BED     PT/OT:   Gait:  Gait  Base of Support: Narrowed  Speed/Maren: Slow, Shuffled  Step Length: Right shortened, Left shortened  Gait Abnormalities: Decreased step clearance, Trunk sway increased, Shuffling gait, Step to gait  Ambulation - Level of Assistance: Minimal assistance, Moderate assistance  Distance (ft): 15 Feet (ft)  Assistive Device: Walker bud  Interventions: Safety awareness training, Tactile cues, Verbal cues, Manual cues            Interventions: Safety awareness training, Tactile cues, Verbal cues, Manual cues    Vital Signs:    Patient Vitals for the past 8 hrs:   BP Temp Pulse Resp SpO2   18 0805 182/72 97.6 °F (36.4 °C) 65 19 90 %   18 0409 175/73 98.8 °F (37.1 °C) 74 18 91 %     Temp (24hrs), Av.4 °F (36.9 °C), Min:97.6 °F (36.4 °C), Max:99.6 °F (37.6 °C)      Pain Control:   Pain Assessment  Pain Scale 1: Visual  Pain Intensity 1: 0  Pain Onset 1: post op  Pain Location 1: Hip  Pain Orientation 1: Right  Pain Description 1: Aching, Other (comment) (spasms)  Pain Intervention(s) 1: Medication (see MAR)    Meds:    Current Facility-Administered Medications   Medication Dose Route Frequency    potassium chloride 20 mEq in 100 ml IVPB  20 mEq IntraVENous ONCE    sodium chloride (NS) flush 5-10 mL  5-10 mL IntraVENous Q8H    sodium chloride (NS) flush 5-10 mL  5-10 mL IntraVENous PRN    oxyCODONE IR (ROXICODONE) tablet 5 mg  5 mg Oral Q4H PRN    ondansetron (ZOFRAN) injection 4 mg  4 mg IntraVENous Q4H PRN    docusate sodium (COLACE) capsule 100 mg  100 mg Oral BID    alum-mag hydroxide-simeth (MYLANTA) oral suspension 30 mL  30 mL Oral Q4H PRN    enoxaparin (LOVENOX) injection 30 mg  30 mg SubCUTAneous Q24H    metoprolol succinate (TOPROL-XL) XL tablet 200 mg  200 mg Oral BID    hydrALAZINE (APRESOLINE) 20 mg/mL injection 10 mg  10 mg IntraVENous Q6H PRN    benazepril (LOTENSIN) tablet 40 mg  40 mg Oral DAILY    hydrALAZINE (APRESOLINE) tablet 50 mg  50 mg Oral TID    verapamil (CALAN) tablet 40 mg  40 mg Oral BID    acetaminophen (TYLENOL) tablet 650 mg  650 mg Oral Q8H    traMADol (ULTRAM) tablet 50 mg  50 mg Oral Q6H PRN    naloxone (NARCAN) injection 0.4 mg  0.4 mg IntraVENous EVERY 2 MINUTES AS NEEDED    cefTRIAXone (ROCEPHIN) 1 g in 0.9% sodium chloride (MBP/ADV) 50 mL  1 g IntraVENous Q24H    insulin lispro (HUMALOG) injection   SubCUTAneous TIDAC    mupirocin calcium (BACTROBAN) 2 % cream   Topical BID       LAB:    Recent Labs      04/13/18   0555   04/10/18   1050   HCT  26.6*   < >  32.4*   HGB  8.8*   < >  11.0*   INR   --    --   1.1    < > = values in this interval not displayed.        24 Hour Assessment Issues:    Oriented    Discharge Planning: SNF    Transfuse PRBC's:      Assessment & Physician's Comment:  Dressing is clean, dry, and intact  Neurovascular checks within normal limits    Principal Problem:    Hip dislocation, right ([address] (4/10/2018)    Active Problems:    Essential hypertension ()      Anemia ()      Mixed hyperlipidemia (9/15/2017)      Type 2 diabetes mellitus with diabetic polyneuropathy, without long-term current use of insulin ([address] (9/15/2017)      UTI (urinary tract infection) (4/10/2018)        Plan:  [address] [place] [address]   WILL NEED SNF FOR REHAB      Monica Irving MD

## 2018-05-10 PROBLEM — E11.21 TYPE 2 DIABETES WITH NEPHROPATHY (HCC): Status: ACTIVE | Noted: 2018-05-10

## 2018-08-03 ENCOUNTER — HOSPITAL ENCOUNTER (OUTPATIENT)
Dept: MAMMOGRAPHY | Age: 83
Discharge: HOME | End: 2018-08-03
Attending: FAMILY MEDICINE
Payer: MEDICARE

## 2018-08-03 DIAGNOSIS — Z87.81 S/P RIGHT HIP FRACTURE: ICD-10-CM

## 2018-08-03 PROCEDURE — 77080 DXA BONE DENSITY AXIAL: CPT

## 2018-08-06 PROBLEM — M81.0 AGE RELATED OSTEOPOROSIS: Status: ACTIVE | Noted: 2018-08-06

## 2018-08-07 NOTE — PROGRESS NOTES
Pt notified of bone density results and will have to call back regarding appt due to transportation.

## 2018-11-21 PROBLEM — S73.004A HIP DISLOCATION, RIGHT (HCC): Status: RESOLVED | Noted: 2018-04-10 | Resolved: 2018-11-21

## 2019-05-18 NOTE — DISCHARGE SUMMARY
Discharge Summary     Patient: Chelsea Khan MRN: [id number]  SSN: [id number]    YOB: 1935  Age: 81 y.o. Sex: female       Admit Date: 4/10/2018    Discharge Date: 2018      Admission Diagnoses: Closed fracture of right hip, initial encounter [facility]) [S72.001A]    Discharge Diagnoses:   Problem List as of 2018  Date Reviewed: 4/10/2018          Codes Class Noted - Resolved    * (Principal)Hip dislocation, right ([facility] ICD-10-CM: S73.004A  ICD-9-CM: 835.00  4/10/2018 - Present        UTI (urinary tract infection) ICD-10-CM: N39.0  ICD-9-CM: 599.0  4/10/2018 - Present        Mixed hyperlipidemia ICD-10-CM: E78.2  ICD-9-CM: 272.2  9/15/2017 - Present        Type 2 diabetes mellitus with diabetic polyneuropathy, without long-term current use of insulin ([facility] ICD-10-CM: E11.42  ICD-9-CM: 250.60, 357.2  9/15/2017 - Present        Essential hypertension ICD-10-CM: I10  ICD-9-CM: 401.9  Unknown - Present        Depression ICD-10-CM: F32.9  ICD-9-CM: 311  Unknown - Present        Anemia ICD-10-CM: D64.9  ICD-9-CM: 754. 9  Unknown - Present        Irritable bowel syndrome ICD-10-CM: K58.9  ICD-9-CM: 564.1  Unknown - Present        Renal colic WFO-17-ZF: W74  ROP-8-OS: 171. 0  Unknown - Present        Insomnia ICD-10-CM: G47.00  ICD-9-CM: 780.52  Unknown - Present        RESOLVED: HLD (hyperlipidemia) ICD-10-CM: E78.5  ICD-9-CM: 272.4  Unknown - 9/15/2017        RESOLVED: Diabetes mellitus type 2, controlled ([facility] ICD-10-CM: E11.9  ICD-9-CM: 250.00  Unknown - 9/15/2017        RESOLVED: Hypopotassemia ICD-10-CM: E87.6  ICD-9-CM: 276.8  Unknown - 2017        RESOLVED: Calculus of kidney ICD-10-CM: N20.0  ICD-9-CM: 592.0  Unknown - 2017        RESOLVED: Hypocalcemia and hypomagnesemia of  ICD-10-CM: P71.1  ICD-9-CM: 775.4  Unknown - 2017        RESOLVED: Polyneuropathy in diabetes ([facility] ICD-10-CM: E11.42  ICD-9-CM: 250.60, 357.2  Unknown - 9/15/2017        RESOLVED: abdominal  X 4 weeks worse today with  nausea Hyposmolality and/or hyponatremia ICD-10-CM: E87.1  ICD-9-CM: 276.1  Unknown - 9/7/2017               Discharge Condition: [facility] Course:   Ms. Ashley Goel is a 82 yo F with PMHx of  HTN, dyslipidemia, chronic anemia, kidney stones ( unable to take calcium supplements ), who was admitted on 4/10/18 after she  fell at home after she tripped. Right hip x ray showed right femoral neck dislocation and orthopedic surgery was consulted. S/p ORIF R hip 4/11/2018. Of note, patient had a recent left humeral fracture 2 months ago, which was treated conservatively. No other falls in the last year. She tolerated her procedure well. During her hospitalization she was noted with UTI, started on rocephin. So far urine culture is growing streptococcus agalactiae serogroup B. She will complete a total of 7 days of antibiotics. She participated with rehab and patient is stable [facility]. Physical Exam:  General:               Alert, cooperative, no acute distress   HEENT:               NCAT. No obvious deformity. Nares normal. No drainage  Lungs:                 Decreased air entry b/l. No wheezing/rhonchi/rales NC 2L  Cardiovascular:   RRR. No m/r/g. No pedal edema b/l. +2 PT/DT pulses b/l. Abdomen:             S/nt/nd. Bowel sounds normal. .   Skin:                     Not Jaundiced  Extremities:          R hip with dressing  Neurologic:          AAOx3. No gross focal deficit. Psychiatric:         Good mood. Normal affect. Denies any SI/HI.        Consults: Orthopedics, PT/OT     Significant Diagnostic Studies: SEE DC SUMMARY NOTE     Disposition: [facility] [address] [facility]     Discharge Medications:   Current Discharge Medication List      START taking these medications    Details   alum-mag hydroxide-simeth (MYLANTA) 200-200-20 mg/5 mL susp Take 30 mL by mouth every four (4) hours as needed.   Qty: 1 Bottle, Refills: 0      cefTRIAXone 1 gram 1 g, ADDaptor 1 Device IVPB 1 g by IntraVENous route every twenty-four (24) hours for 4 days. Qty: 4 Dose, Refills: 0      docusate sodium (COLACE) 100 mg capsule Take 1 Cap by mouth two (2) times a day for 90 days. Qty: 60 Cap, Refills: 2      enoxaparin (LOVENOX) 30 mg/0.3 mL injection 0.3 mL by SubCUTAneous route every twenty-four (24) hours for 26 days. Qty: 26 Syringe, Refills: 0      insulin lispro (HUMALOG) 100 unit/mL injection As per sliding scale  Qty: 1 Vial, Refills: 0      oxyCODONE IR (ROXICODONE) 5 mg immediate release tablet Take 1 Tab by mouth every four (4) hours as needed. Max Daily Amount: 30 mg.  Qty: 15 Tab, Refills: 0    Associated Diagnoses: Closed fracture of right hip, initial encounter ([facility])      traMADol (ULTRAM) 50 mg tablet Take 1 Tab by mouth every six (6) hours as needed. Max Daily Amount: 200 mg. Qty: 20 Tab, Refills: 0    Associated Diagnoses: Closed fracture of right hip, initial encounter ([facility]      mupirocin calcium (BACTROBAN) 2 % topical cream Apply to nares  Qty: 15 g, Refills: 0         CONTINUE these medications which have CHANGED    Details   clonazePAM (KLONOPIN) 1 mg tablet Take 0.25 Tabs by mouth two (2) times daily as needed. Max Daily Amount: 0.5 mg. TAKE 1 TABLET BY MOUTH TWICE A DAY  Indications: anxiety  Qty: 15 Tab, Refills: 0    Comments: This request is for a new prescription for a controlled substance as required by Federal/State law. PT WOULD LIKE A REFILL TO GET HER TO HER APPT IN NOVEMBER. Associated Diagnoses: MOISES (generalized anxiety disorder)         CONTINUE these medications which have NOT CHANGED    Details   ACETYLCARNITINE HCL (ACETYL L-CARNITINE PO) Take 1 Tab by mouth daily (after lunch).       verapamil (CALAN) 40 mg tablet TAKE 1 TABLET BY MOUTH TWICE A DAY  Qty: 60 Tab, Refills: 5      metFORMIN ER (GLUCOPHAGE XR) 500 mg tablet TAKE ONE TABLET BY MOUTH ONE TIME DAILY  Qty: 30 Tab, Refills: 11    Associated Diagnoses: Type 2 diabetes mellitus with diabetic polyneuropathy, without long-term current use of insulin ([facility])      metoprolol succinate (TOPROL-XL) 200 mg XL tablet Take 1 Tab by mouth two (2) times a day. Qty: 180 Tab, Refills: 3    Associated Diagnoses: Essential hypertension      hydrALAZINE (APRESOLINE) 50 mg tablet Take 1 Tab by mouth three (3) times daily. Qty: 270 Tab, Refills: 4    Associated Diagnoses: Essential hypertension      omeprazole (PRILOSEC) 20 mg capsule TAKE ONE CAPSULE BY MOUTH EVERY DAY  Qty: 30 Cap, Refills: 11      benazepril (LOTENSIN) 40 mg tablet TAKE 1 TABLET BY MOUTH TWICE A DAY  Qty: 180 Tab, Refills: 3      KRILL OIL PO Take  by mouth. aspirin 81 mg chewable tablet Take 81 mg by mouth daily. fluticasone (FLONASE) 50 mcg/actuation nasal spray 2 Sprays by Both Nostrils route daily. Qty: 1 Bottle, Refills: 1    Associated Diagnoses: Acute non-recurrent maxillary sinusitis      cetirizine (ZYRTEC) 10 mg tablet Take 1 Tab by mouth nightly. Indications: Allergic Rhinitis  Qty: 30 Tab, Refills: 1    Associated Diagnoses: Acute non-recurrent maxillary sinusitis      ondansetron (ZOFRAN ODT) 4 mg disintegrating tablet Take 1 Tab by mouth every eight (8) hours as needed for Nausea.   Qty: 6 Tab, Refills: 0      furosemide (LASIX) 20 mg tablet TAKE 1 TABLET BY MOUTH EVERY DAY  Qty: 30 Tab, Refills: 2    Associated Diagnoses: Localized edema      ACCU-CHEK OVIDIO PLUS TEST STRP strip TEST ONE TIME DAILY  Qty: 100 Strip, Refills: 3    Associated Diagnoses: Controlled type 2 diabetes mellitus without complication, without long-term current use of insulin ([facility])      ACCU-CHEK OVIDIO PLUS METER misc TEST ONE TIME DAILY  Qty: 1 Each, Refills: 0      ACCU-CHEK SOFTCLIX LANCETS misc USE AS DIRECTED EVERY DAY  Qty: 100 Each, Refills: 3         STOP taking these medications       morphine IR (MS IR) 15 mg tablet Comments:   Reason for Stopping:               Activity: Activity as tolerated  Diet: Cardiac Diet  Wound Care: As directed    Follow-up Appointments   Procedures    FOLLOW UP VISIT Appointment in: One Week pcp     pcp     Standing Status:   Standing     Number of Occurrences:   1     Order Specific Question:   Appointment in     Answer:    One Week    FOLLOW UP VISIT Appointment in: One Month Orthopedics     Orthopedics     Standing Status:   Standing     Number of Occurrences:   1     Standing Expiration Date:   4/14/2018     Order Specific Question:   Appointment in     Answer:   One Month       Signed By: Alfredo Mcclure MD     April 13, 2018

## 2019-06-07 PROBLEM — H91.93 BILATERAL HEARING LOSS: Status: ACTIVE | Noted: 2019-05-20

## 2019-06-07 PROBLEM — R26.81 GAIT INSTABILITY: Status: ACTIVE | Noted: 2019-05-20

## 2019-06-07 PROBLEM — E87.1 HYPONATREMIA: Status: ACTIVE | Noted: 2019-05-21

## 2019-12-19 PROBLEM — F03.90 DEMENTIA WITHOUT BEHAVIORAL DISTURBANCE (HCC): Status: ACTIVE | Noted: 2019-07-23

## 2020-06-04 PROBLEM — N18.30 CKD (CHRONIC KIDNEY DISEASE) STAGE 3, GFR 30-59 ML/MIN (HCC): Status: ACTIVE | Noted: 2020-06-04

## 2020-06-18 ENCOUNTER — HOSPITAL ENCOUNTER (OUTPATIENT)
Age: 85
Setting detail: OBSERVATION
Discharge: NURSING, REHAB OR LONG-TERM CARE | End: 2020-06-24
Attending: EMERGENCY MEDICINE | Admitting: INTERNAL MEDICINE
Payer: MEDICARE

## 2020-06-18 DIAGNOSIS — N17.9 AKI (ACUTE KIDNEY INJURY) (HCC): Primary | ICD-10-CM

## 2020-06-18 DIAGNOSIS — F41.1 GAD (GENERALIZED ANXIETY DISORDER): ICD-10-CM

## 2020-06-18 DIAGNOSIS — I10 ESSENTIAL HYPERTENSION: ICD-10-CM

## 2020-06-18 DIAGNOSIS — N39.0 URINARY TRACT INFECTION WITHOUT HEMATURIA, SITE UNSPECIFIED: ICD-10-CM

## 2020-06-18 DIAGNOSIS — E87.5 HYPERKALEMIA: ICD-10-CM

## 2020-06-18 LAB
ALBUMIN SERPL-MCNC: 2.7 G/DL (ref 3.2–4.6)
ALBUMIN/GLOB SERPL: 0.5 {RATIO} (ref 1.2–3.5)
ALP SERPL-CCNC: 68 U/L (ref 50–136)
ALT SERPL-CCNC: 16 U/L (ref 12–65)
ANION GAP SERPL CALC-SCNC: 10 MMOL/L (ref 7–16)
ANION GAP SERPL CALC-SCNC: 8 MMOL/L (ref 7–16)
AST SERPL-CCNC: 19 U/L (ref 15–37)
BACTERIA URNS QL MICRO: ABNORMAL /HPF
BASOPHILS # BLD: 0 K/UL (ref 0–0.2)
BASOPHILS NFR BLD: 0 % (ref 0–2)
BILIRUB SERPL-MCNC: 0.5 MG/DL (ref 0.2–1.1)
BUN SERPL-MCNC: 43 MG/DL (ref 8–23)
BUN SERPL-MCNC: 45 MG/DL (ref 8–23)
CALCIUM SERPL-MCNC: 7.5 MG/DL (ref 8.3–10.4)
CALCIUM SERPL-MCNC: 8 MG/DL (ref 8.3–10.4)
CASTS URNS QL MICRO: 0 /LPF
CHLORIDE SERPL-SCNC: 104 MMOL/L (ref 98–107)
CHLORIDE SERPL-SCNC: 105 MMOL/L (ref 98–107)
CO2 SERPL-SCNC: 15 MMOL/L (ref 21–32)
CO2 SERPL-SCNC: 17 MMOL/L (ref 21–32)
CREAT SERPL-MCNC: 3.12 MG/DL (ref 0.6–1)
CREAT SERPL-MCNC: 3.24 MG/DL (ref 0.6–1)
CRYSTALS URNS QL MICRO: 0 /LPF
DIFFERENTIAL METHOD BLD: ABNORMAL
EOSINOPHIL # BLD: 0.1 K/UL (ref 0–0.8)
EOSINOPHIL NFR BLD: 0 % (ref 0.5–7.8)
EPI CELLS #/AREA URNS HPF: ABNORMAL /HPF
ERYTHROCYTE [DISTWIDTH] IN BLOOD BY AUTOMATED COUNT: 13.2 % (ref 11.9–14.6)
GLOBULIN SER CALC-MCNC: 5.3 G/DL (ref 2.3–3.5)
GLUCOSE SERPL-MCNC: 126 MG/DL (ref 65–100)
GLUCOSE SERPL-MCNC: 149 MG/DL (ref 65–100)
HCT VFR BLD AUTO: 23.7 % (ref 35.8–46.3)
HGB BLD-MCNC: 7.6 G/DL (ref 11.7–15.4)
IMM GRANULOCYTES # BLD AUTO: 0 K/UL (ref 0–0.5)
IMM GRANULOCYTES NFR BLD AUTO: 0 % (ref 0–5)
LYMPHOCYTES # BLD: 2 K/UL (ref 0.5–4.6)
LYMPHOCYTES NFR BLD: 17 % (ref 13–44)
MCH RBC QN AUTO: 29.7 PG (ref 26.1–32.9)
MCHC RBC AUTO-ENTMCNC: 32.1 G/DL (ref 31.4–35)
MCV RBC AUTO: 92.6 FL (ref 79.6–97.8)
MONOCYTES # BLD: 1.2 K/UL (ref 0.1–1.3)
MONOCYTES NFR BLD: 11 % (ref 4–12)
MUCOUS THREADS URNS QL MICRO: 0 /LPF
NEUTS SEG # BLD: 8.5 K/UL (ref 1.7–8.2)
NEUTS SEG NFR BLD: 72 % (ref 43–78)
NRBC # BLD: 0 K/UL (ref 0–0.2)
OTHER OBSERVATIONS,UCOM: ABNORMAL
PLATELET # BLD AUTO: 404 K/UL (ref 150–450)
PMV BLD AUTO: 9.7 FL (ref 9.4–12.3)
POTASSIUM SERPL-SCNC: 6.2 MMOL/L (ref 3.5–5.1)
POTASSIUM SERPL-SCNC: 6.6 MMOL/L (ref 3.5–5.1)
PROT SERPL-MCNC: 8 G/DL (ref 6.3–8.2)
RBC # BLD AUTO: 2.56 M/UL (ref 4.05–5.2)
RBC #/AREA URNS HPF: ABNORMAL /HPF
SODIUM SERPL-SCNC: 129 MMOL/L (ref 136–145)
SODIUM SERPL-SCNC: 130 MMOL/L (ref 136–145)
WBC # BLD AUTO: 11.9 K/UL (ref 4.3–11.1)
WBC URNS QL MICRO: >100 /HPF
YEAST URNS QL MICRO: ABNORMAL

## 2020-06-18 PROCEDURE — 74011250636 HC RX REV CODE- 250/636: Performed by: EMERGENCY MEDICINE

## 2020-06-18 PROCEDURE — 81015 MICROSCOPIC EXAM OF URINE: CPT

## 2020-06-18 PROCEDURE — 81003 URINALYSIS AUTO W/O SCOPE: CPT

## 2020-06-18 PROCEDURE — 99285 EMERGENCY DEPT VISIT HI MDM: CPT

## 2020-06-18 PROCEDURE — 93005 ELECTROCARDIOGRAM TRACING: CPT | Performed by: EMERGENCY MEDICINE

## 2020-06-18 PROCEDURE — 80053 COMPREHEN METABOLIC PANEL: CPT

## 2020-06-18 PROCEDURE — 87086 URINE CULTURE/COLONY COUNT: CPT

## 2020-06-18 PROCEDURE — 74011250637 HC RX REV CODE- 250/637: Performed by: INTERNAL MEDICINE

## 2020-06-18 PROCEDURE — 65390000012 HC CONDITION CODE 44 OBSERVATION

## 2020-06-18 PROCEDURE — 85025 COMPLETE CBC W/AUTO DIFF WBC: CPT

## 2020-06-18 PROCEDURE — 65660000000 HC RM CCU STEPDOWN

## 2020-06-18 PROCEDURE — 74011250636 HC RX REV CODE- 250/636: Performed by: INTERNAL MEDICINE

## 2020-06-18 RX ORDER — FLUTICASONE PROPIONATE 50 MCG
2 SPRAY, SUSPENSION (ML) NASAL DAILY
Status: DISCONTINUED | OUTPATIENT
Start: 2020-06-19 | End: 2020-06-24 | Stop reason: HOSPADM

## 2020-06-18 RX ORDER — SODIUM CHLORIDE 0.9 % (FLUSH) 0.9 %
5-40 SYRINGE (ML) INJECTION AS NEEDED
Status: DISCONTINUED | OUTPATIENT
Start: 2020-06-18 | End: 2020-06-24 | Stop reason: HOSPADM

## 2020-06-18 RX ORDER — ACETAMINOPHEN 325 MG/1
650 TABLET ORAL
Status: DISCONTINUED | OUTPATIENT
Start: 2020-06-18 | End: 2020-06-24 | Stop reason: HOSPADM

## 2020-06-18 RX ORDER — AMOXICILLIN 250 MG
1 CAPSULE ORAL
Status: DISCONTINUED | OUTPATIENT
Start: 2020-06-18 | End: 2020-06-24 | Stop reason: HOSPADM

## 2020-06-18 RX ORDER — HEPARIN SODIUM 5000 [USP'U]/ML
5000 INJECTION, SOLUTION INTRAVENOUS; SUBCUTANEOUS EVERY 8 HOURS
Status: DISCONTINUED | OUTPATIENT
Start: 2020-06-19 | End: 2020-06-19

## 2020-06-18 RX ORDER — ONDANSETRON 2 MG/ML
4 INJECTION INTRAMUSCULAR; INTRAVENOUS
Status: DISCONTINUED | OUTPATIENT
Start: 2020-06-18 | End: 2020-06-18

## 2020-06-18 RX ORDER — ONDANSETRON 4 MG/1
4 TABLET, ORALLY DISINTEGRATING ORAL
Status: DISCONTINUED | OUTPATIENT
Start: 2020-06-18 | End: 2020-06-24 | Stop reason: HOSPADM

## 2020-06-18 RX ORDER — SODIUM POLYSTYRENE SULFONATE 15 G/60ML
30 SUSPENSION ORAL; RECTAL
Status: COMPLETED | OUTPATIENT
Start: 2020-06-18 | End: 2020-06-18

## 2020-06-18 RX ORDER — SODIUM CHLORIDE 9 MG/ML
75 INJECTION, SOLUTION INTRAVENOUS CONTINUOUS
Status: DISCONTINUED | OUTPATIENT
Start: 2020-06-19 | End: 2020-06-22

## 2020-06-18 RX ORDER — PANTOPRAZOLE SODIUM 40 MG/1
40 TABLET, DELAYED RELEASE ORAL
Status: DISCONTINUED | OUTPATIENT
Start: 2020-06-19 | End: 2020-06-24 | Stop reason: HOSPADM

## 2020-06-18 RX ORDER — GUAIFENESIN 100 MG/5ML
81 LIQUID (ML) ORAL DAILY
Status: DISCONTINUED | OUTPATIENT
Start: 2020-06-19 | End: 2020-06-24 | Stop reason: HOSPADM

## 2020-06-18 RX ORDER — SODIUM CHLORIDE 0.9 % (FLUSH) 0.9 %
5-40 SYRINGE (ML) INJECTION EVERY 8 HOURS
Status: DISCONTINUED | OUTPATIENT
Start: 2020-06-19 | End: 2020-06-24 | Stop reason: HOSPADM

## 2020-06-18 RX ADMIN — SODIUM POLYSTYRENE SULFONATE 30 G: 15 SUSPENSION ORAL; RECTAL at 22:24

## 2020-06-18 RX ADMIN — Medication 10 ML: at 23:28

## 2020-06-18 RX ADMIN — SODIUM CHLORIDE 125 ML/HR: 9 INJECTION, SOLUTION INTRAVENOUS at 23:25

## 2020-06-18 RX ADMIN — SODIUM CHLORIDE 500 ML: 900 INJECTION, SOLUTION INTRAVENOUS at 19:33

## 2020-06-18 NOTE — ED PROVIDER NOTES
Here due to weakness and being sent in for further evaluation. She states the only reason she is here is because she is lost interest in eating states that most food does not taste of any interest to her. Denies any abdominal pain. No vomiting. Overall fairly poor oral in take due to \"food ain't good\"    The history is provided by the patient. Fatigue   This is a new problem. The current episode started more than 2 days ago. The problem has been gradually worsening. There was no focality noted. Pertinent negatives include no mental status change. There has been no fever. Pertinent negatives include no altered mental status, no confusion and no headaches.         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 ([address]     Renal colic     Type II or unspecified type diabetes mellitus without mention of complication, not stated as uncontrolled     Unspecified essential hypertension        Past Surgical History:   Procedure Laterality Date    HX APPENDECTOMY      HX CHOLECYSTECTOMY      HX GYN      hysterectomy: Partial/ Ovaries remain    HX HEENT      HX TONSILLECTOMY      HX UROLOGICAL      Bladder Surgery         Family History:   Problem Relation Age of Onset    Hypertension Mother     Stroke Mother     Stroke Father     Hypertension Father        Social History     Socioeconomic History    Marital status:      Spouse name: Not on file    Number of children: Not on file    Years of education: Not on file    Highest education level: Not on file   Occupational History    Not on file Social Needs    Financial resource strain: Not on file    Food insecurity     Worry: Not on file     Inability: Not on file    Transportation needs     Medical: Not on file     Non-medical: Not on file   Tobacco Use    Smoking status: Never Smoker    Smokeless tobacco: Never Used   Substance and Sexual Activity    Alcohol use: No     Alcohol/week: 0.0 standard drinks    Drug use: No    Sexual activity: Not on file   Lifestyle    Physical activity     Days per week: Not on file     Minutes per session: Not on file    Stress: Not on file   Relationships    Social connections     Talks on phone: Not on file     Gets together: Not on file     Attends Jewish service: Not on file     Active member of club or organization: Not on file     Attends meetings of clubs or organizations: Not on file     Relationship status: Not on file    Intimate partner violence     Fear of current or ex partner: Not on file     Emotionally abused: Not on file     Physically abused: Not on file     Forced sexual activity: Not on file   Other Topics Concern    Not on file   Social History Narrative    Not on file         ALLERGIES: Pcn [penicillins] and Sulfa (sulfonamide antibiotics)    Review of Systems   Constitutional: Positive for appetite change and fatigue. Negative for chills and fever. Respiratory: Negative. Gastrointestinal: Negative. Neurological: Negative for headaches. Psychiatric/Behavioral: Negative for confusion and decreased concentration. All other systems reviewed and are negative. Vitals:    06/18/20 1815   BP: 107/54   Pulse: 82   Resp: 19   SpO2: 98%   Height: 5' 2\" (1.575 m)            Physical Exam  Vitals signs and nursing note reviewed. Constitutional:       Appearance: She is not ill-appearing or diaphoretic. HENT:      Right Ear: External ear normal.      Mouth/Throat:      Mouth: Mucous membranes are dry. Eyes:      General: No scleral icterus.   Neck:      Musculoskeletal: Normal range of motion. No muscular tenderness. Cardiovascular:      Rate and Rhythm: Normal rate. Pulmonary:      Effort: No respiratory distress. Breath sounds: No stridor. No wheezing or rhonchi. Abdominal:      Palpations: Abdomen is soft. Tenderness: There is no right CVA tenderness, left CVA tenderness, guarding or rebound. Musculoskeletal:      Right lower leg: No edema. Left lower leg: No edema. Skin:     Coloration: Skin is not jaundiced. Neurological:      Mental Status: She is alert. Mental status is at baseline. Psychiatric:         Mood and Affect: Mood normal.         Behavior: Behavior normal.         Thought Content: Thought content normal.          MDM  Number of Diagnoses or Management Options  SHERIE (acute kidney injury) ([facility]): Hyperkalemia:   Diagnosis management comments: Sent restive volume depletion here with history of some degree of renal insufficiency that is now worse. Associated with this she has moderate amount of hyperkalemia. No EKG changes. After patient has transition to hospice care urinalysis comes back with UTI changes additionally.        Amount and/or Complexity of Data Reviewed  Clinical lab tests: reviewed and ordered  Decide to obtain previous medical records or to obtain history from someone other than the patient: yes  Obtain history from someone other than the patient: yes  Independent visualization of images, tracings, or specimens: yes    Risk of Complications, Morbidity, and/or Mortality  Diagnostic procedures: low  Management options: moderate           Procedures

## 2020-06-18 NOTE — ED NOTES
Spoke with Mj Tee in lab about running BMP that was ordered. She stated that she would look for the blood to run it. Composite Graft Text: The defect edges were debeveled with a #15 scalpel blade.  Given the location of the defect, shape of the defect, the proximity to free margins and the fact the defect was full thickness a composite graft was deemed most appropriate.  The defect was outline and then transferred to the donor site.  A full thickness graft was then excised from the donor site. The graft was then placed in the primary defect, oriented appropriately and then sutured into place.  The secondary defect was then repaired using a primary closure.

## 2020-06-18 NOTE — ED TRIAGE NOTES
Pt brought in from home via EMS, per family pt has increased weakness today states recently tx for UTI. Ems states hx of dementia, pt arrives a/o x3. EMS reports 116/56, hr 82, 96.5 oral and bgl of 156. Pt respirations even and unlabored, denies shob or any discomfort at this time. Pt masked.

## 2020-06-19 ENCOUNTER — APPOINTMENT (OUTPATIENT)
Dept: ULTRASOUND IMAGING | Age: 85
End: 2020-06-19
Attending: INTERNAL MEDICINE
Payer: MEDICARE

## 2020-06-19 LAB
ANION GAP SERPL CALC-SCNC: 11 MMOL/L (ref 7–16)
ATRIAL RATE: 78 BPM
BUN SERPL-MCNC: 37 MG/DL (ref 8–23)
CALCIUM SERPL-MCNC: 7.6 MG/DL (ref 8.3–10.4)
CALCULATED P AXIS, ECG09: 37 DEGREES
CALCULATED R AXIS, ECG10: 24 DEGREES
CALCULATED T AXIS, ECG11: 35 DEGREES
CHLORIDE SERPL-SCNC: 113 MMOL/L (ref 98–107)
CK SERPL-CCNC: 37 U/L (ref 21–215)
CO2 SERPL-SCNC: 17 MMOL/L (ref 21–32)
CORTIS BS SERPL-MCNC: 21.6 UG/DL
CREAT SERPL-MCNC: 2.72 MG/DL (ref 0.6–1)
CREAT UR-MCNC: 25.8 MG/DL
DIAGNOSIS, 93000: NORMAL
ERYTHROCYTE [DISTWIDTH] IN BLOOD BY AUTOMATED COUNT: 13.4 % (ref 11.9–14.6)
FERRITIN SERPL-MCNC: 147 NG/ML (ref 8–388)
FOLATE SERPL-MCNC: 50.8 NG/ML (ref 3.1–17.5)
GLUCOSE SERPL-MCNC: 87 MG/DL (ref 65–100)
HCT VFR BLD AUTO: 21.1 % (ref 35.8–46.3)
HCT VFR BLD AUTO: 21.3 % (ref 35.8–46.3)
HGB BLD-MCNC: 6.7 G/DL (ref 11.7–15.4)
HGB BLD-MCNC: 7 G/DL (ref 11.7–15.4)
HGB RETIC QN AUTO: 31 PG (ref 29–35)
IMM RETICS NFR: 15.4 % (ref 3–15.9)
IRON SATN MFR SERPL: 5 %
IRON SERPL-MCNC: 8 UG/DL (ref 35–150)
IRON SERPL-MCNC: 9 UG/DL (ref 35–150)
Lab: NORMAL
MAGNESIUM SERPL-MCNC: 1.1 MG/DL (ref 1.8–2.4)
MCH RBC QN AUTO: 29.4 PG (ref 26.1–32.9)
MCHC RBC AUTO-ENTMCNC: 31.5 G/DL (ref 31.4–35)
MCV RBC AUTO: 93.4 FL (ref 79.6–97.8)
NRBC # BLD: 0 K/UL (ref 0–0.2)
P-R INTERVAL, ECG05: 176 MS
PLATELET # BLD AUTO: 337 K/UL (ref 150–450)
PMV BLD AUTO: 9.6 FL (ref 9.4–12.3)
POTASSIUM SERPL-SCNC: 4.4 MMOL/L (ref 3.5–5.1)
Q-T INTERVAL, ECG07: 338 MS
QRS DURATION, ECG06: 72 MS
QTC CALCULATION (BEZET), ECG08: 385 MS
RBC # BLD AUTO: 2.28 M/UL (ref 4.05–5.2)
REFERENCE LAB,REFLB: NORMAL
RETICS # AUTO: 0.07 M/UL (ref 0.03–0.1)
RETICS/RBC NFR AUTO: 3.2 % (ref 0.3–2)
SODIUM SERPL-SCNC: 141 MMOL/L (ref 136–145)
SODIUM UR-SCNC: 68 MMOL/L
TEST DESCRIPTION:,ATST: NORMAL
TIBC SERPL-MCNC: 186 UG/DL (ref 250–450)
TSH SERPL DL<=0.005 MIU/L-ACNC: 3.93 UIU/ML (ref 0.36–3.74)
VENTRICULAR RATE, ECG03: 78 BPM
VIT B12 SERPL-MCNC: 553 PG/ML (ref 193–986)
WBC # BLD AUTO: 7.7 K/UL (ref 4.3–11.1)

## 2020-06-19 PROCEDURE — 36430 TRANSFUSION BLD/BLD COMPNT: CPT

## 2020-06-19 PROCEDURE — 80048 BASIC METABOLIC PNL TOTAL CA: CPT

## 2020-06-19 PROCEDURE — 99218 HC RM OBSERVATION: CPT

## 2020-06-19 PROCEDURE — P9016 RBC LEUKOCYTES REDUCED: HCPCS

## 2020-06-19 PROCEDURE — 85046 RETICYTE/HGB CONCENTRATE: CPT

## 2020-06-19 PROCEDURE — 74011250636 HC RX REV CODE- 250/636: Performed by: INTERNAL MEDICINE

## 2020-06-19 PROCEDURE — 74011000258 HC RX REV CODE- 258: Performed by: INTERNAL MEDICINE

## 2020-06-19 PROCEDURE — 83540 ASSAY OF IRON: CPT

## 2020-06-19 PROCEDURE — 74011250637 HC RX REV CODE- 250/637: Performed by: INTERNAL MEDICINE

## 2020-06-19 PROCEDURE — 85027 COMPLETE CBC AUTOMATED: CPT

## 2020-06-19 PROCEDURE — 83930 ASSAY OF BLOOD OSMOLALITY: CPT

## 2020-06-19 PROCEDURE — 86923 COMPATIBILITY TEST ELECTRIC: CPT

## 2020-06-19 PROCEDURE — 96374 THER/PROPH/DIAG INJ IV PUSH: CPT

## 2020-06-19 PROCEDURE — 65390000012 HC CONDITION CODE 44 OBSERVATION

## 2020-06-19 PROCEDURE — 82533 TOTAL CORTISOL: CPT

## 2020-06-19 PROCEDURE — 86900 BLOOD TYPING SEROLOGIC ABO: CPT

## 2020-06-19 PROCEDURE — 82550 ASSAY OF CK (CPK): CPT

## 2020-06-19 PROCEDURE — 83735 ASSAY OF MAGNESIUM: CPT

## 2020-06-19 PROCEDURE — 77030019905 HC CATH URETH INTMIT MDII -A

## 2020-06-19 PROCEDURE — 82570 ASSAY OF URINE CREATININE: CPT

## 2020-06-19 PROCEDURE — 85018 HEMOGLOBIN: CPT

## 2020-06-19 PROCEDURE — 84300 ASSAY OF URINE SODIUM: CPT

## 2020-06-19 PROCEDURE — 82746 ASSAY OF FOLIC ACID SERUM: CPT

## 2020-06-19 PROCEDURE — 36415 COLL VENOUS BLD VENIPUNCTURE: CPT

## 2020-06-19 PROCEDURE — 83935 ASSAY OF URINE OSMOLALITY: CPT

## 2020-06-19 PROCEDURE — 76770 US EXAM ABDO BACK WALL COMP: CPT

## 2020-06-19 PROCEDURE — 82607 VITAMIN B-12: CPT

## 2020-06-19 PROCEDURE — 82728 ASSAY OF FERRITIN: CPT

## 2020-06-19 PROCEDURE — 84443 ASSAY THYROID STIM HORMONE: CPT

## 2020-06-19 PROCEDURE — 96375 TX/PRO/DX INJ NEW DRUG ADDON: CPT

## 2020-06-19 RX ORDER — METOPROLOL SUCCINATE 100 MG/1
200 TABLET, EXTENDED RELEASE ORAL 2 TIMES DAILY
Status: DISCONTINUED | OUTPATIENT
Start: 2020-06-19 | End: 2020-06-24 | Stop reason: HOSPADM

## 2020-06-19 RX ORDER — LANOLIN ALCOHOL/MO/W.PET/CERES
3 CREAM (GRAM) TOPICAL
COMMUNITY

## 2020-06-19 RX ORDER — SODIUM CHLORIDE 9 MG/ML
250 INJECTION, SOLUTION INTRAVENOUS AS NEEDED
Status: DISCONTINUED | OUTPATIENT
Start: 2020-06-19 | End: 2020-06-24 | Stop reason: HOSPADM

## 2020-06-19 RX ORDER — HYDRALAZINE HYDROCHLORIDE 50 MG/1
50 TABLET, FILM COATED ORAL 3 TIMES DAILY
Status: DISCONTINUED | OUTPATIENT
Start: 2020-06-19 | End: 2020-06-24 | Stop reason: HOSPADM

## 2020-06-19 RX ORDER — CLONAZEPAM 1 MG/1
0.25 TABLET ORAL
Status: DISCONTINUED | OUTPATIENT
Start: 2020-06-19 | End: 2020-06-24 | Stop reason: HOSPADM

## 2020-06-19 RX ORDER — MULTIVITAMIN
1 TABLET ORAL
COMMUNITY

## 2020-06-19 RX ORDER — MAGNESIUM SULFATE HEPTAHYDRATE 40 MG/ML
4 INJECTION, SOLUTION INTRAVENOUS ONCE
Status: COMPLETED | OUTPATIENT
Start: 2020-06-19 | End: 2020-06-19

## 2020-06-19 RX ORDER — LOPERAMIDE HCL 2 MG
2 TABLET ORAL
COMMUNITY

## 2020-06-19 RX ADMIN — CEFTRIAXONE SODIUM 1 G: 1 INJECTION, POWDER, FOR SOLUTION INTRAMUSCULAR; INTRAVENOUS at 17:26

## 2020-06-19 RX ADMIN — Medication 10 ML: at 21:55

## 2020-06-19 RX ADMIN — Medication 10 ML: at 05:44

## 2020-06-19 RX ADMIN — HYDRALAZINE HYDROCHLORIDE 50 MG: 50 TABLET, FILM COATED ORAL at 22:25

## 2020-06-19 RX ADMIN — ASPIRIN 81 MG 81 MG: 81 TABLET ORAL at 08:53

## 2020-06-19 RX ADMIN — Medication 10 ML: at 17:26

## 2020-06-19 RX ADMIN — METOPROLOL SUCCINATE 200 MG: 100 TABLET, EXTENDED RELEASE ORAL at 22:25

## 2020-06-19 RX ADMIN — MAGNESIUM SULFATE HEPTAHYDRATE 4 G: 40 INJECTION, SOLUTION INTRAVENOUS at 06:37

## 2020-06-19 RX ADMIN — SODIUM CHLORIDE 125 ML/HR: 9 INJECTION, SOLUTION INTRAVENOUS at 19:37

## 2020-06-19 RX ADMIN — PANTOPRAZOLE SODIUM 40 MG: 40 TABLET, DELAYED RELEASE ORAL at 05:44

## 2020-06-19 RX ADMIN — ACETAMINOPHEN 650 MG: 325 TABLET, FILM COATED ORAL at 19:35

## 2020-06-19 RX ADMIN — SODIUM CHLORIDE 125 ML/HR: 9 INJECTION, SOLUTION INTRAVENOUS at 06:35

## 2020-06-19 NOTE — PROGRESS NOTES
TRANSFER - IN REPORT:    Verbal report received from Gina Tomas RN(name) on Mic Novoa  being received from ED(unit) for routine progression of care      Report consisted of patients Situation, Background, Assessment and   Recommendations(SBAR). Information from the following report(s) SBAR, ED Summary, Intake/Output and Recent Results was reviewed with the receiving nurse. Opportunity for questions and clarification was provided. Assessment to be completed upon patients arrival to unit and care assumed.

## 2020-06-19 NOTE — PROGRESS NOTES
06/18/20 2340   Dual Skin Pressure Injury Assessment   Dual Skin Pressure Injury Assessment WDL   Second Care Provider (Based on [address] [place] [address]) Lorenzo Rodriguez, RN   Bilateral heels red but blanchable, small scabbed areas on upper back. Rest skin intact.

## 2020-06-19 NOTE — H&P
Hospitalist Note     Admit Date:  2020  6:12 PM   Name:  Darylene Kava   Age:  81 y.o.  :  1935   MRN:  [id number]   PCP:  Julissa Christine MD  Treatment Team: Attending Provider: Kay Castellanos MD; Primary Nurse: Jesu Smith, RN; Primary Nurse: Verito Bone    HPI/Subjective:     Darylene Kava is a 81 y.o. female with a history of early dementia, hypertension, type 2 diabetes mellitus, depression presenting with dehydration. Patient lives at home with her son, the son reports that she has not been eating for the past few days. He states that she is unable to eat much at the time and does not eat at all on her own volition. She reports feeling weak and typically does not ambulate at all at home. When he finds her home she is been in the same place on movement and has not eaten at all. He notes some confusion and disorientation and she often does not know where she is. He called EMS today and she was found to have a blood glucose of 50 at home even after eating a meal.  Her blood pressure was 100/50. Patient was brought to the ED. Of note patient was recently treated for UTI with Macrobid. Patient herself states that she feels fine and states she does not know why she is in the hospital and wants to go home. 10 systems reviewed and negative except as noted in HPI.     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 ([address]     Renal colic     Type II or unspecified type diabetes mellitus without mention of complication, not stated as uncontrolled     Unspecified essential hypertension       Past Surgical History:   Procedure Laterality Date    HX APPENDECTOMY      HX CHOLECYSTECTOMY      HX GYN      hysterectomy: Partial/ Ovaries remain    HX HEENT      HX TONSILLECTOMY      HX UROLOGICAL      Bladder Surgery      Allergies   Allergen Reactions    Pcn [Penicillins] Unknown (comments)    Sulfa (Sulfonamide Antibiotics) Unknown (comments) and Other (comments)     Unknown reaction      Social History     Tobacco Use    Smoking status: Never Smoker    Smokeless tobacco: Never Used   Substance Use Topics    Alcohol use: No     Alcohol/week: 0.0 standard drinks      Family History   Problem Relation Age of Onset    Hypertension Mother     Stroke Mother     Stroke Father     Hypertension Father            Immunization History   Administered Date(s) Administered    TB Skin Test (PPD) Intradermal 04/10/2018       PTA Medications:  Prior to Admission Medications   Prescriptions Last Dose Informant Patient Reported? Taking? ACETYLCARNITINE HCL (ACETYL L-CARNITINE PO)   Yes No   Sig: Take 1 Tab by mouth daily (after lunch). Blood-Glucose Meter (ACCU-CHEK OVIDIO PLUS METER) misc   No No   Sig: TEST ONE TIME DAILY   KRILL OIL PO   Yes No   Sig: Take  by mouth. alendronate (FOSAMAX) 70 mg tablet   No No   Sig: Take 1 Tab by mouth every seven (7) days. aspirin 81 mg chewable tablet   Yes No   Sig: Take 81 mg by mouth daily. benazepril (LOTENSIN) 40 mg tablet   No No   Sig: TAKE 1 TABLET BY MOUTH TWICE A DAY   cloNIDine HCl (CATAPRES) 0.1 mg tablet   No No   Sig: Take 1 Tab by mouth two (2) times a day. Indications: high blood pressure   clonazePAM (KLONOPIN) 0.5 mg tablet   No No   Sig: Take 0.5 Tabs by mouth two (2) times daily as needed (anxiety). Max Daily Amount: 0.5 mg. TAKE 1 TABLET BY MOUTH TWICE A DAY  Indications: anxiety   ergocalciferol (ERGOCALCIFEROL) 50,000 unit capsule   No No   Sig: Take 1 Cap by mouth every seven (7) days.  Indications: osteoporosis, a condition of weak bones, low vitamin D levels   fluticasone propionate (Flonase Allergy Relief) 50 mcg/actuation nasal spray   Yes No   Si Sprays by Both Nostrils route daily. furosemide (LASIX) 20 mg tablet   No No   Sig: TAKE 1 TABLET BY MOUTH EVERY DAY as needed   glucose blood VI test strips (ACCU-CHEK OVIDIO PLUS TEST STRP) strip   No No   Sig: TEST ONE TIME DAILY   hydrALAZINE (APRESOLINE) 50 mg tablet   No No   Sig: Take 1 Tab by mouth three (3) times daily. insulin lispro (HUMALOG) 100 unit/mL injection   No No   Sig: As per sliding scale   lancets (ACCU-CHEK SOFTCLIX LANCETS) misc   No No   Sig: USE AS DIRECTED EVERY DAY   metFORMIN ER (GLUCOPHAGE XR) 500 mg tablet   No No   Sig: TAKE ONE TABLET BY MOUTH ONE TIME DAILY   metoprolol succinate (TOPROL-XL) 200 mg XL tablet   No No   Sig: Take 1 Tab by mouth two (2) times a day. mupirocin calcium (BACTROBAN) 2 % topical cream   No No   Sig: Apply to nares   nitrofurantoin, macrocrystal-monohydrate, (MACROBID) 100 mg capsule   No No   Sig: Take 1 Cap by mouth two (2) times a day. omeprazole (PRILOSEC) 20 mg capsule   No No   Sig: TAKE ONE CAPSULE BY MOUTH EVERY DAY   verapamil (CALAN) 40 mg tablet   No No   Sig: TAKE 1 TABLET BY MOUTH TWICE A DAY  Indications: high blood pressure      Facility-Administered Medications: None       Objective:     Patient Vitals for the past 24 hrs:   Pulse Resp BP SpO2   20 1815 82 19 107/54 98 %     Oxygen Therapy  O2 Sat (%): 98 % (20)  O2 Device: Room air (20)    Estimated body mass index is 26.89 kg/m² as calculated from the following:    Height as of this encounter: 5' 2\" (1.575 m). Weight as of 20: 66.7 kg (147 lb). No intake or output data in the 24 hours ending 20    *Note that automatically entered I/Os may not be accurate; dependent on patient compliance with collection and accurate  by assistants.     Physical Exam:  General:    No acute distress. Awake. Alert. Elderly and frail-appearing. Eyes:   Normal sclerae, no icterus or injection. PERRL. Extraocular movements intact. HENT:  Normocephalic, atraumatic. Dry mucous membranes. No cervical LAD. CV:   RRR. Normal S1/S2. No m/r/g. Lungs:  CTAB. No wheezing, rhonchi, or rales. No increased work of breathing. Abdomen: Soft, nontender, nondistended. Extremities: Warm and dry. No cyanosis or edema. Neurologic: CN II-XII grossly intact. Sensation grossly intact. Able to move all extremities. Tremulous. A&Ox2  Skin:     No rashes or jaundice. Normal coloration  Psych:  Normal mood and affect. I reviewed the labs, imaging, EKGs, telemetry, and other studies done this admission. Data Review:   Recent Results (from the past 24 hour(s))   CBC WITH AUTOMATED DIFF    Collection Time: 06/18/20  6:23 PM   Result Value Ref Range    WBC 11.9 (H) 4.3 - 11.1 K/uL    RBC 2.56 (L) 4.05 - 5.2 M/uL    HGB 7.6 (L) 11.7 - 15.4 g/dL    HCT 23.7 (L) 35.8 - 46.3 %    MCV 92.6 79.6 - 97.8 FL    MCH 29.7 26.1 - 32.9 PG    MCHC 32.1 31.4 - 35.0 g/dL    RDW 13.2 11.9 - 14.6 %    PLATELET 077 704 - 445 K/uL    MPV 9.7 9.4 - 12.3 FL    ABSOLUTE NRBC 0.00 0.0 - 0.2 K/uL    DF AUTOMATED      NEUTROPHILS 72 43 - 78 %    LYMPHOCYTES 17 13 - 44 %    MONOCYTES 11 4.0 - 12.0 %    EOSINOPHILS 0 (L) 0.5 - 7.8 %    BASOPHILS 0 0.0 - 2.0 %    IMMATURE GRANULOCYTES 0 0.0 - 5.0 %    ABS. NEUTROPHILS 8.5 (H) 1.7 - 8.2 K/UL    ABS. LYMPHOCYTES 2.0 0.5 - 4.6 K/UL    ABS. MONOCYTES 1.2 0.1 - 1.3 K/UL    ABS. EOSINOPHILS 0.1 0.0 - 0.8 K/UL    ABS. BASOPHILS 0.0 0.0 - 0.2 K/UL    ABS. IMM.  GRANS. 0.0 0.0 - 0.5 K/UL   METABOLIC PANEL, COMPREHENSIVE    Collection Time: 06/18/20  6:23 PM   Result Value Ref Range    Sodium 129 (L) 136 - 145 mmol/L    Potassium 6.6 (HH) 3.5 - 5.1 mmol/L    Chloride 104 98 - 107 mmol/L    CO2 15 (L) 21 - 32 mmol/L    Anion gap 10 7 - 16 mmol/L    Glucose 149 (H) 65 - 100 mg/dL    BUN 45 (H) 8 - 23 MG/DL    Creatinine 3.24 (H) 0.6 - 1.0 MG/DL    GFR est AA 18 (L) >60 ml/min/1.73m2    GFR est non-AA 14 (L) >60 ml/min/1.73m2    Calcium 8.0 (L) 8.3 - 10.4 MG/DL    Bilirubin, total 0.5 0.2 - 1.1 MG/DL    ALT (SGPT) 16 12 - 65 U/L    AST (SGOT) 19 15 - 37 U/L    Alk. phosphatase 68 50 - 136 U/L    Protein, total 8.0 6.3 - 8.2 g/dL    Albumin 2.7 (L) 3.2 - 4.6 g/dL    Globulin 5.3 (H) 2.3 - 3.5 g/dL    A-G Ratio 0.5 (L) 1.2 - 3.5     EKG, 12 LEAD, INITIAL    Collection Time: 06/18/20  6:24 PM   Result Value Ref Range    Ventricular Rate 78 BPM    Atrial Rate 78 BPM    P-R Interval 176 ms    QRS Duration 72 ms    Q-T Interval 338 ms    QTC Calculation (Bezet) 385 ms    Calculated P Axis 37 degrees    Calculated R Axis 24 degrees    Calculated T Axis 35 degrees    Diagnosis       !! AGE AND GENDER SPECIFIC ECG ANALYSIS !! Normal sinus rhythm  Cannot rule out Anterior infarct , age undetermined  Abnormal ECG  No previous ECGs available     METABOLIC PANEL, BASIC    Collection Time: 06/18/20  7:34 PM   Result Value Ref Range    Sodium 130 (L) 136 - 145 mmol/L    Potassium 6.2 (HH) 3.5 - 5.1 mmol/L    Chloride 105 98 - 107 mmol/L    CO2 17 (L) 21 - 32 mmol/L    Anion gap 8 7 - 16 mmol/L    Glucose 126 (H) 65 - 100 mg/dL    BUN 43 (H) 8 - 23 MG/DL    Creatinine 3.12 (H) 0.6 - 1.0 MG/DL    GFR est AA 18 (L) >60 ml/min/1.73m2    GFR est non-AA 15 (L) >60 ml/min/1.73m2    Calcium 7.5 (L) 8.3 - 10.4 MG/DL       All Micro Results     Procedure Component Value Units Date/Time    CULTURE, BLOOD [[id number]]     Order Status:  Canceled Specimen:  Blood     CULTURE, BLOOD [[id number]]     Order Status:  Canceled Specimen:  Blood           No current facility-administered medications for this encounter. Current Outpatient Medications   Medication Sig    fluticasone propionate (Flonase Allergy Relief) 50 mcg/actuation nasal spray 2 Sprays by Both Nostrils route daily.     nitrofurantoin, macrocrystal-monohydrate, (MACROBID) 100 mg capsule Take 1 Cap by mouth two (2) times a day.  verapamil (CALAN) 40 mg tablet TAKE 1 TABLET BY MOUTH TWICE A DAY  Indications: high blood pressure    ergocalciferol (ERGOCALCIFEROL) 50,000 unit capsule Take 1 Cap by mouth every seven (7) days. Indications: osteoporosis, a condition of weak bones, low vitamin D levels    glucose blood VI test strips (ACCU-CHEK OVIDIO PLUS TEST STRP) strip TEST ONE TIME DAILY    lancets (ACCU-CHEK SOFTCLIX LANCETS) misc USE AS DIRECTED EVERY DAY    metoprolol succinate (TOPROL-XL) 200 mg XL tablet Take 1 Tab by mouth two (2) times a day.  cloNIDine HCl (CATAPRES) 0.1 mg tablet Take 1 Tab by mouth two (2) times a day. Indications: high blood pressure    hydrALAZINE (APRESOLINE) 50 mg tablet Take 1 Tab by mouth three (3) times daily.  benazepril (LOTENSIN) 40 mg tablet TAKE 1 TABLET BY MOUTH TWICE A DAY    metFORMIN ER (GLUCOPHAGE XR) 500 mg tablet TAKE ONE TABLET BY MOUTH ONE TIME DAILY    omeprazole (PRILOSEC) 20 mg capsule TAKE ONE CAPSULE BY MOUTH EVERY DAY    alendronate (FOSAMAX) 70 mg tablet Take 1 Tab by mouth every seven (7) days.  Blood-Glucose Meter (ACCU-CHEK OVIDIO PLUS METER) misc TEST ONE TIME DAILY    clonazePAM (KLONOPIN) 0.5 mg tablet Take 0.5 Tabs by mouth two (2) times daily as needed (anxiety). Max Daily Amount: 0.5 mg. TAKE 1 TABLET BY MOUTH TWICE A DAY  Indications: anxiety    insulin lispro (HUMALOG) 100 unit/mL injection As per sliding scale    furosemide (LASIX) 20 mg tablet TAKE 1 TABLET BY MOUTH EVERY DAY as needed    mupirocin calcium (BACTROBAN) 2 % topical cream Apply to nares    ACETYLCARNITINE HCL (ACETYL L-CARNITINE PO) Take 1 Tab by mouth daily (after lunch).  KRILL OIL PO Take  by mouth.  aspirin 81 mg chewable tablet Take 81 mg by mouth daily. Other Studies:  No results found for this visit on 06/18/20. No results found.       Assessment and Plan:     Hospital Problems as of 6/18/2020 Date Reviewed: 9/20/2019    Gabriela Villalobospeyton Vasquez is a 81 y.o. female presenting with acute renal failure    Plan:  #Acute renal failure  Continue aggressive IV fluid hydration  Obtain renal ultrasound  Check urine sodium, urine electrolytes  Check CPK  Continue to monitor renal function and urine output  Avoid nephrotoxic agents    #Acute on chronic anemia   Check iron panel, ferritin, B12 and folate   Transfuse PRBC for goal hemoglobin greater than 7  Continue to monitor CBC    #Hyperkalemia  Give Kayexalate  Continue to monitor potassium and magnesium    #Hyponatremia, chronic  Continue IV fluid hydration  Monitor BMP    #Hypertension  Hold benazepril  Hold verapamil  Hold hydralazine  Hold clonidine  Hold metoprolol  Hold Lasix    #Type 2 diabetes mellitus  Hold metformin  Sliding scale insulin    DVT ppx ordered  Code status:  Full  Estimated LOS:  Greater than 2 midnights  Risk:  high    Signed:  Alea Smith MD

## 2020-06-19 NOTE — PROGRESS NOTES
Problem: Falls - Risk of  Goal: *Absence of Falls  Description: Document Chcuho Bradley Fall Risk and appropriate interventions in the flowsheet.   Outcome: Progressing Towards Goal  Note: Fall Risk Interventions:  Mobility Interventions: Bed/chair exit alarm, Communicate number of staff needed for ambulation/transfer, Patient to call before getting OOB, PT Consult for mobility concerns         Medication Interventions: Patient to call before getting OOB, Teach patient to arise slowly    Elimination Interventions: Bed/chair exit alarm, Call light in reach, Patient to call for help with toileting needs, Toileting schedule/hourly rounds

## 2020-06-19 NOTE — PROGRESS NOTES
END OF SHIFT NOTE:    INTAKE/OUTPUT  06/18 0701 - 06/19 0700  In: 773 [I.V.:773]  Out: -   Voiding: YES  Catheter: NO  Drain:              Flatus: Patient does have flatus present. Stool:  2 occurrences. Characteristics:  Stool Assessment  Stool Color: Brown  Stool Appearance: Loose  Stool Amount: Large  Stool Source/Status: Rectum    Emesis: 0 occurrences. Characteristics:        VITAL SIGNS  Patient Vitals for the past 12 hrs:   Temp Pulse Resp BP SpO2   06/19/20 0405  85      06/19/20 0402 98.4 °F (36.9 °C) 76 15 115/61 98 %   06/19/20 0052  78      06/18/20 2337 98.8 °F (37.1 °C) 75 15 111/58 98 %   06/18/20 2159  74 15 109/53 97 %   06/18/20 2130  76 16 105/54 97 %   06/18/20 2059  79 17 109/53 97 %       Pain Assessment  Pain Intensity 1: 0 (06/18/20 2340)        Patient Stated Pain Goal: 0    Ambulating  Yes    Shift report given to oncoming nurse at the bedside.     Jef Hill RN

## 2020-06-19 NOTE — PROGRESS NOTES
Spoke to Ms. Deana Moody (pleasantly confused) and her son Mr. Emily Black (his cell is [phone]). He lives with her at her house in [place] , [address] [place] [address] Street - [address]). She is somewhat limited with ADLs, and the son helps her to the bathroom, but generally she manages with his assistance. He confirms that she has not been drinking enough fluids (she is observation status for SHERIE). At discharge, the plan is home, possibly with home health RN, OT, and PT. Orders for OT and PT placed into [facility]/CC. Case Management to follow and assist with discharge planning.     Care Management Interventions  Transition of Care Consult (CM Consult): (P) Discharge Planning  Physical Therapy Consult: (P) Yes  Occupational Therapy Consult: (P) Yes  Current Support Network: (P) Own Home, Has Personal Caregivers, Family Lives [place]

## 2020-06-19 NOTE — PROGRESS NOTES
Utilization Review Physician has recommended this patient is most appropriate as Outpatient Status receiving Observation Services. The Attending provider agreed and an outpatient order was placed on the chart as the Attending Provider requested. The patient will be provided the documentation for a Condition Code 44, conversion from Inpatient to Outpatient Status, and questions answered. The [facility]ON letter explaining the observation services was given to patient with verbal explanation and verbal understanding was received about information given. Letter signed by patient with date and time. Signed copy placed in the patient's chart for scanning by HIS and copy left at bedside for patient information. 6/19/20 Pt signed Code 44. CM spoke w/pt's son/ER contact Marianna Marino @ [phone] who verbally ack'd pt's rec of Code 40. DANA Simon & KERA Steel informed.

## 2020-06-19 NOTE — ED NOTES
TRANSFER - OUT REPORT:    Verbal report given to Deandra(name) on Tunde Mckee  being transferred to [facility](unit) for routine progression of care       Report consisted of patients Situation, Background, Assessment and   Recommendations(SBAR). Information from the following report(s) SBAR was reviewed with the receiving nurse. Lines:   Peripheral IV 06/18/20 Right Wrist (Active)   Site Assessment Clean, dry, & intact 6/18/2020  6:20 PM        Opportunity for questions and clarification was provided.       Patient transported with:   [email or web address]

## 2020-06-19 NOTE — PROGRESS NOTES
Unable to complete PTA med list as Pt unsure medications she takes. Pt will call son tomorrow to bring a med list when here to visit.

## 2020-06-20 LAB
ABO + RH BLD: NORMAL
ANION GAP SERPL CALC-SCNC: 10 MMOL/L (ref 7–16)
BLD PROD TYP BPU: NORMAL
BLOOD GROUP ANTIBODIES SERPL: NORMAL
BPU ID: NORMAL
BUN SERPL-MCNC: 27 MG/DL (ref 8–23)
CALCIUM SERPL-MCNC: 6.9 MG/DL (ref 8.3–10.4)
CHLORIDE SERPL-SCNC: 112 MMOL/L (ref 98–107)
CO2 SERPL-SCNC: 16 MMOL/L (ref 21–32)
CREAT SERPL-MCNC: 2.11 MG/DL (ref 0.6–1)
CROSSMATCH RESULT,%XM: NORMAL
ERYTHROCYTE [DISTWIDTH] IN BLOOD BY AUTOMATED COUNT: 13.3 % (ref 11.9–14.6)
GLUCOSE SERPL-MCNC: 117 MG/DL (ref 65–100)
HCT VFR BLD AUTO: 26.2 % (ref 35.8–46.3)
HGB BLD-MCNC: 8.3 G/DL (ref 11.7–15.4)
Lab: NORMAL
MAGNESIUM SERPL-MCNC: 2 MG/DL (ref 1.8–2.4)
MCH RBC QN AUTO: 29.1 PG (ref 26.1–32.9)
MCHC RBC AUTO-ENTMCNC: 31.7 G/DL (ref 31.4–35)
MCV RBC AUTO: 91.9 FL (ref 79.6–97.8)
NRBC # BLD: 0 K/UL (ref 0–0.2)
PLATELET # BLD AUTO: 318 K/UL (ref 150–450)
PMV BLD AUTO: 9.4 FL (ref 9.4–12.3)
POTASSIUM SERPL-SCNC: 3.4 MMOL/L (ref 3.5–5.1)
RBC # BLD AUTO: 2.85 M/UL (ref 4.05–5.2)
REFERENCE LAB,REFLB: NORMAL
SODIUM SERPL-SCNC: 138 MMOL/L (ref 136–145)
SPECIMEN EXP DATE BLD: NORMAL
STATUS OF UNIT,%ST: NORMAL
TEST DESCRIPTION:,ATST: NORMAL
UNIT DIVISION, %UDIV: 0
WBC # BLD AUTO: 7 K/UL (ref 4.3–11.1)

## 2020-06-20 PROCEDURE — 74011250636 HC RX REV CODE- 250/636: Performed by: INTERNAL MEDICINE

## 2020-06-20 PROCEDURE — 74011000258 HC RX REV CODE- 258: Performed by: INTERNAL MEDICINE

## 2020-06-20 PROCEDURE — 36415 COLL VENOUS BLD VENIPUNCTURE: CPT

## 2020-06-20 PROCEDURE — 97162 PT EVAL MOD COMPLEX 30 MIN: CPT

## 2020-06-20 PROCEDURE — 96376 TX/PRO/DX INJ SAME DRUG ADON: CPT

## 2020-06-20 PROCEDURE — 86580 TB INTRADERMAL TEST: CPT | Performed by: INTERNAL MEDICINE

## 2020-06-20 PROCEDURE — 74011000302 HC RX REV CODE- 302: Performed by: INTERNAL MEDICINE

## 2020-06-20 PROCEDURE — 80048 BASIC METABOLIC PNL TOTAL CA: CPT

## 2020-06-20 PROCEDURE — 74011250637 HC RX REV CODE- 250/637: Performed by: INTERNAL MEDICINE

## 2020-06-20 PROCEDURE — 97530 THERAPEUTIC ACTIVITIES: CPT

## 2020-06-20 PROCEDURE — 74011250636 HC RX REV CODE- 250/636: Performed by: HOSPITALIST

## 2020-06-20 PROCEDURE — 99218 HC RM OBSERVATION: CPT

## 2020-06-20 PROCEDURE — 96372 THER/PROPH/DIAG INJ SC/IM: CPT

## 2020-06-20 PROCEDURE — 85027 COMPLETE CBC AUTOMATED: CPT

## 2020-06-20 PROCEDURE — 83735 ASSAY OF MAGNESIUM: CPT

## 2020-06-20 RX ORDER — POTASSIUM CHLORIDE 20 MEQ/1
20 TABLET, EXTENDED RELEASE ORAL
Status: COMPLETED | OUTPATIENT
Start: 2020-06-20 | End: 2020-06-20

## 2020-06-20 RX ORDER — HALOPERIDOL 5 MG/ML
2 INJECTION INTRAMUSCULAR ONCE
Status: COMPLETED | OUTPATIENT
Start: 2020-06-20 | End: 2020-06-20

## 2020-06-20 RX ADMIN — METOPROLOL SUCCINATE 200 MG: 100 TABLET, EXTENDED RELEASE ORAL at 09:06

## 2020-06-20 RX ADMIN — Medication 10 ML: at 22:35

## 2020-06-20 RX ADMIN — POTASSIUM CHLORIDE 20 MEQ: 20 TABLET, EXTENDED RELEASE ORAL at 09:07

## 2020-06-20 RX ADMIN — ACETAMINOPHEN 650 MG: 325 TABLET, FILM COATED ORAL at 22:30

## 2020-06-20 RX ADMIN — CLONAZEPAM 0.25 MG: 1 TABLET ORAL at 22:35

## 2020-06-20 RX ADMIN — HYDRALAZINE HYDROCHLORIDE 50 MG: 50 TABLET, FILM COATED ORAL at 16:18

## 2020-06-20 RX ADMIN — ASPIRIN 81 MG 81 MG: 81 TABLET ORAL at 09:06

## 2020-06-20 RX ADMIN — CEFTRIAXONE SODIUM 1 G: 1 INJECTION, POWDER, FOR SOLUTION INTRAMUSCULAR; INTRAVENOUS at 16:17

## 2020-06-20 RX ADMIN — HYDRALAZINE HYDROCHLORIDE 50 MG: 50 TABLET, FILM COATED ORAL at 22:30

## 2020-06-20 RX ADMIN — Medication 10 ML: at 14:18

## 2020-06-20 RX ADMIN — METOPROLOL SUCCINATE 200 MG: 100 TABLET, EXTENDED RELEASE ORAL at 22:31

## 2020-06-20 RX ADMIN — CLONAZEPAM 0.25 MG: 1 TABLET ORAL at 04:08

## 2020-06-20 RX ADMIN — HALOPERIDOL LACTATE 2 MG: 5 INJECTION, SOLUTION INTRAMUSCULAR at 23:47

## 2020-06-20 RX ADMIN — SODIUM CHLORIDE 125 ML/HR: 9 INJECTION, SOLUTION INTRAVENOUS at 05:44

## 2020-06-20 RX ADMIN — HYDRALAZINE HYDROCHLORIDE 50 MG: 50 TABLET, FILM COATED ORAL at 09:06

## 2020-06-20 RX ADMIN — ACETAMINOPHEN 650 MG: 325 TABLET, FILM COATED ORAL at 17:29

## 2020-06-20 RX ADMIN — TUBERCULIN PURIFIED PROTEIN DERIVATIVE 5 UNITS: 5 INJECTION, SOLUTION INTRADERMAL at 18:02

## 2020-06-20 RX ADMIN — SODIUM CHLORIDE 125 ML/HR: 9 INJECTION, SOLUTION INTRAVENOUS at 14:17

## 2020-06-20 NOTE — PROGRESS NOTES
Case Management spoke with patient in rm [phone] about [facility]. Per PT recommendation patient would benefit from [facility]. Patient states she is 81years old and it is what it is. Pt states \"she has a rolling walker with 4 wheels and she can run with it if she wants\" (pt laughs). CM explained to pt that this was recommendation of PT evaluation but she did not have to go if she did not want too. Pt stated she wanted to go home. Pt has her younger son that she lives with. [employer] recommended facilities list given to patient by Hailey Mcgee will follow-up for [facility] choices. Per SW conversation pt has agreed to go to [facility] now. CM will continue to follow.

## 2020-06-20 NOTE — PROGRESS NOTES
Problem: Mobility Impaired (Adult and Pediatric)  Goal: *Acute Goals and Plan of Care (Insert Text)  Description: 1.  Ms. Aryan Lott will perform supine to sit and sit to supine independently in 7 days. 2.  Ms. Aryan Lott will perform sit to stand and bed to chair with RW independently in 7 days. 3.  Ms. Aryan Lott will perform gait with rolling walker 100 ft independently in 7 days. Outcome: Progressing Towards Goal       PHYSICAL THERAPY: Initial Assessment and Daily Note 6/20/2020  OBSERVATION:    Payor: Jose Anglin / Plan: [address] [place] [address] HMO / Product Type: Managed Care Medicare /       NAME/AGE/GENDER: Audrey Smith is a 81 y.o. female   PRIMARY DIAGNOSIS: SHERIE (acute kidney injury) ([facility] [address] [N17.9]  SHERIE (acute kidney injury) ([facility] [address] [N17.9]  UTI (urinary tract infection) [N39.0] SHERIE (acute kidney injury) ([facility] [address] SHERIE (acute kidney injury) ([facility] [address]        ICD-10: Treatment Diagnosis:    Generalized Muscle Weakness (M62.81)  Difficulty in walking, Not elsewhere classified (R26.2)   Precaution/Allergies:  Pcn [penicillins] and Sulfa (sulfonamide antibiotics)      ASSESSMENT:     Ms. Aryan Lott presents sitting in bed with her son present. She is rather pleasant and talkative but confused. She is also having hallucinations. This is a very difficult situation. Her son tells me how she lives with his brother who has to work all day. She is by herself during the day. She wears a brief that is usually soaked by the time he gets home. He leaves her food but she hardly eats or doesn't eat at all. The son tells me how she will invite strangers into the house. Son has cameras set up all over the house to watch her. He has come home before to pick her up off the floor and then go back to work. The son that is present cares for his wife who is ill. There is no way he could supervise his mother too. This is a tough situation because the family is doing everything in their power but it may not be enough. They are hoping for a rehab stay and at this point she is requiring min assist for transfers and min assist for gait with rolling walker. She is very shaky. She would not be able to ambulate independently. She would benefit from a rehab stay with fair potential for progress. Ultimately she would benefit from supervision. Apparently at night she will get up and open the front door to go outside. There is an alarm on the door and her son sleeps in the family room to be right there if he needs to. This section established at most recent assessment   PROBLEM LIST (Impairments causing functional limitations):  Decreased Strength  Decreased Transfer Abilities  Decreased Ambulation Ability/Technique  Decreased Activity Tolerance   INTERVENTIONS PLANNED: (Benefits and precautions of physical therapy have been discussed with the patient.)  Bed Mobility  Gait Training  Therapeutic Activites  Therapeutic Exercise/Strengthening  Transfer Training     TREATMENT PLAN: Frequency/Duration: 3 times a week for duration of hospital stay  Rehabilitation Potential For Stated Goals: [facility] (at time of discharge pending progress):    Placement: It is my opinion, based on this patient's performance to date, that Ms. Kevin Wetzel may benefit from intensive therapy at a [address] [place] [address] after discharge due to the functional deficits listed above that are likely to improve with skilled rehabilitation and concerns that he/she may be unsafe to be unsupervised at home due to confusion, and recent falls. Currently requiring min assist for all mobility and gait. .  Equipment:   None at this time              HISTORY:   History of Present Injury/Illness (Reason for Referral):  Nguyen Stover is a 81 y.o. female with a history of early dementia, hypertension, type 2 diabetes mellitus, depression presenting with dehydration.   Patient lives at home with her son, the son reports that she has not been eating for the past few days. He states that she is unable to eat much at the time and does not eat at all on her own volition. She reports feeling weak and typically does not ambulate at all at home. When he finds her home she is been in the same place on movement and has not eaten at all. He notes some confusion and disorientation and she often does not know where she is. He called EMS today and she was found to have a blood glucose of 50 at home even after eating a meal.  Her blood pressure was 100/50. Patient was brought to the ED. Of note patient was recently treated for UTI with Macrobid. Patient herself states that she feels fine and states she does not know why she is in the hospital and wants to go home. Past Medical History/Comorbidities:   Ms. 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 Ms. Johnnie Hawkins  has a past surgical history that includes hx cholecystectomy; hx heent; hx appendectomy; hx urological; hx gyn; and hx tonsillectomy. Social History/Living Environment:   Home Environment: Trailer/mobile home  Wheelchair Ramp: Yes  One/Two Story Residence: One story  Living Alone: No  Support Systems: Family member(s)  Patient Expects to be Discharged to[de-identified] Unknown  Current DME Used/Available at Home: Walker, rollator  Prior Level of Function/Work/Activity:  Uses a rollator. Home alone during the day. Hx of falls.    Age     Number of Personal Factors/Comorbidities that affect the Plan of Care: 1-2: MODERATE COMPLEXITY   EXAMINATION:   Most Recent Physical Functioning:   Gross Assessment:                  Posture:  Posture (WDL): Exceptions to WDL  Posture Assessment: Forward head, Trunk flexion, Rounded shoulders  Balance:  Sitting: Impaired  Sitting - Static: Fair (occasional)  Sitting - Dynamic: Fair (occasional)  Standing: Impaired; With support  Standing - Static: Fair  Standing - Dynamic : Poor Bed Mobility:     Wheelchair Mobility:     Transfers:  Sit to Stand: Minimum assistance  Stand to Sit: Minimum assistance  Bed to Chair: Minimum assistance  Gait:     Base of Support: Widened;Center of gravity altered  Step Length: Right shortened;Left shortened  Distance (ft): 25 Feet (ft)  Assistive Device: Walker, rolling  Ambulation - Level of Assistance: Minimal assistance      Body Structures Involved:  Muscles Body Functions Affected: Movement Related Activities and Participation Affected: Mobility   Number of elements that affect the Plan of Care: 3: MODERATE COMPLEXITY   CLINICAL PRESENTATION:   Presentation: Evolving clinical presentation with changing clinical characteristics: MODERATE COMPLEXITY   CLINICAL DECISION MAKIN [facility] Short Form  How much difficulty does the patient currently have. .. Unable A Lot A Little None   1. Turning over in bed (including adjusting bedclothes, sheets and blankets)? [] 1   [] 2   [x] 3   [] 4   2. Sitting down on and standing up from a chair with arms ( e.g., wheelchair, bedside commode, etc.)   [] 1   [] 2   [x] 3   [] 4   3. Moving from lying on back to sitting on the side of the bed? [] 1   [] 2   [x] 3   [] 4   How much help from another person does the patient currently need. .. Total A Lot A Little None   4. Moving to and from a bed to a chair (including a wheelchair)? [] 1   [] 2   [x] 3   [] 4   5. Need to walk in hospital room? [] 1   [] 2   [x] 3   [] 4   6. Climbing 3-5 steps with a railing? [] 1   [] 2   [x] 3   [] 4   © 2007, Trustees of [facility], under license to [employer]. All rights reserved      Score:  Initial: 18 Most Recent: X (Date: -- )    Interpretation of Tool:  Represents activities that are increasingly more difficult (i.e. Bed mobility, Transfers, Gait). Medical Necessity:     Patient is expected to demonstrate progress in   functional technique   to   decrease assistance required with mobility and gait. .  Reason for Services/Other Comments:  Patient   continues to require present interventions due to patient's inability to function at baseline. .   Use of outcome tool(s) and clinical judgement create a POC that gives a: Questionable prediction of patient's progress: MODERATE COMPLEXITY            TREATMENT:   (In addition to Assessment/Re-Assessment sessions the following treatments were rendered)   Pre-treatment Symptoms/Complaints:  none  Pain: Initial:   Pain Intensity 1: 0  Post Session:  none     Therapeutic Activity: (    15): Therapeutic activities including Bed transfers, Chair transfers, and Ambulation on level ground to improve mobility. Required minimal   to promote motor control of bilateral, upper extremity(s), lower extremity(s). Braces/Orthotics/Lines/Etc:   O2 Device: Room air  Treatment/Session Assessment:    Response to Treatment:  good   Interdisciplinary Collaboration:   Registered Nurse  After treatment position/precautions:   Up in chair  Bed alarm/tab alert on  Call light within reach  RN notified  Family at bedside   Compliance with Program/Exercises: Will assess as treatment progresses  Recommendations/Intent for next treatment session: \"Next visit will focus on advancements to more challenging activities and reduction in assistance provided\".   Total Treatment Duration:  PT Patient Time In/Time Out  Time In: 1115  Time Out: [address] CELINA Reddy

## 2020-06-20 NOTE — PROGRESS NOTES
Occupational Therapy Note: Orders received, chart reviewed, but pt is very confused and agitated today even with family at bedside. Pt trying to get OOB. Will hold OT Eval and attempt again tomorrow as time and schedule allows and as the ability for pt to cooperate improves.  Thank you for this referral. Peggy Solano MS, OTR/L

## 2020-06-20 NOTE — PROGRESS NOTES
Hospitalist Progress Note     Admit Date:  2020  6:12 PM   Name:  Nguyen Stover   Age:  81 y.o.  :  1935   MRN:  [id number]   PCP:  Arlet Shabazz MD  Treatment Team: Attending Provider: Viky Caldera MD; Primary Nurse: Alessandra Mclaughlin RN; Utilization Review: Joel Calle RN; Care Manager: Zayra Ornelas RN; Primary Nurse: May Morin    Subjective: The patient is an 71-year-old  lady with HTN, DM type II, CKD stage III with baseline creatinine of 1.5-1.6, chronic normocytic anemia with recent baseline hemoglobin of 8.1, depression, history of kidney stones, early dementia, is presently managed for SHERIE on CKD, acute on chronic anemia, UTI, hypomagnesemia and hyperkalemia. She is presently being hydrated with IV fluids and was initiated on IV ceftriaxone today. She denies any significant complaints, actually denies any significant dysuria, shortness of breath or cough. Her main complaint is generalized weakness. As per the son, she was also confused but is getting better. Hypokalemia has resolved, as the potassium came down from 6.6 on admission to 4.4. Her renal function is also slightly improved, with her creatinine down from 3.24 on admission to 2.72. Her hemoglobin dropped to 6.7 this morning and she was transfused 1 unit of PRBCs.     Objective:     Patient Vitals for the past 24 hrs:   Temp Pulse Resp BP SpO2   20 1549 98.4 °F (36.9 °C) 85 20 149/74 99 %   20 1411 97.6 °F (36.4 °C) 85 22 147/65 99 %   20 1400 98.6 °F (37 °C) 89 22 148/63 100 %   20 1349 97.6 °F (36.4 °C) 87 20 163/64 99 %   20 1144 98 °F (36.7 °C) 82 18 129/64 98 %   20 0654 98.2 °F (36.8 °C) 93 17 127/60 96 %   20 0405  85      20 0402 98.4 °F (36.9 °C) 76 15 115/61 98 %   20 0052  78      20 2337 98.8 °F (37.1 °C) 75 15 111/58 98 %   20 2159  74 15 109/53 97 %   20 2130  76 16 105/54 97 %   06/18/20 2059  79 17 109/53 97 %     Oxygen Therapy  O2 Sat (%): 99 % (06/19/20 1549)  Pulse via Oximetry: 75 beats per minute (06/18/20 2159)  O2 Device: Room air (06/19/20 0720)    Intake/Output Summary (Last 24 hours) at 6/19/2020 2031  Last data filed at 6/19/2020 1920  Gross per 24 hour   Intake 1873 ml   Output 550 ml   Net 1323 ml         General:    Well nourished. Alert. CV:   RRR. No murmur, rub, or gallop. Lungs:   Clear to auscultation bilaterally. No wheezing, rhonchi, or rales. Abdomen:   Soft, nontender, nondistended. Extremities: Warm and dry. No cyanosis or edema. Skin:     No rashes or jaundice. Data Review:  I have reviewed all labs, meds, telemetry events, and studies from the last 24 hours.     Recent Results (from the past 24 hour(s))   URINE MICROSCOPIC    Collection Time: 06/18/20 10:17 PM   Result Value Ref Range    WBC >100 0 /hpf    RBC 20-50 0 /hpf    Epithelial cells 5-10 0 /hpf    Bacteria 4+ (H) 0 /hpf    Casts 0 0 /lpf    Crystals, urine 0 0 /LPF    Mucus 0 0 /lpf    Yeast MODERATE      Other observations RESULTS VERIFIED MANUALLY     METABOLIC PANEL, BASIC    Collection Time: 06/19/20  5:04 AM   Result Value Ref Range    Sodium 141 136 - 145 mmol/L    Potassium 4.4 3.5 - 5.1 mmol/L    Chloride 113 (H) 98 - 107 mmol/L    CO2 17 (L) 21 - 32 mmol/L    Anion gap 11 7 - 16 mmol/L    Glucose 87 65 - 100 mg/dL    BUN 37 (H) 8 - 23 MG/DL    Creatinine 2.72 (H) 0.6 - 1.0 MG/DL    GFR est AA 21 (L) >60 ml/min/1.73m2    GFR est non-AA 18 (L) >60 ml/min/1.73m2    Calcium 7.6 (L) 8.3 - 10.4 MG/DL   CBC W/O DIFF    Collection Time: 06/19/20  5:04 AM   Result Value Ref Range    WBC 7.7 4.3 - 11.1 K/uL    RBC 2.28 (L) 4.05 - 5.2 M/uL    HGB 6.7 (LL) 11.7 - 15.4 g/dL    HCT 21.3 (L) 35.8 - 46.3 %    MCV 93.4 79.6 - 97.8 FL    MCH 29.4 26.1 - 32.9 PG    MCHC 31.5 31.4 - 35.0 g/dL    RDW 13.4 11.9 - 14.6 %    PLATELET 901 194 - 081 K/uL    MPV 9.6 9.4 - 12.3 FL ABSOLUTE NRBC 0.00 0.0 - 0.2 K/uL   MAGNESIUM    Collection Time: 06/19/20  5:04 AM   Result Value Ref Range    Magnesium 1.1 (LL) 1.8 - 2.4 mg/dL   CORTISOL, BASELINE    Collection Time: 06/19/20  5:04 AM   Result Value Ref Range    Cortisol, baseline 21.6 ug/dL   CK    Collection Time: 06/19/20  5:04 AM   Result Value Ref Range    CK 37 21 - 215 U/L   TSH 3RD GENERATION    Collection Time: 06/19/20  5:04 AM   Result Value Ref Range    TSH 3.930 (H) 0.358 - 3.740 uIU/mL   MISC.  LAB TEST    Collection Time: 06/19/20  5:04 AM   Result Value Ref Range    Test Description: SERUM OSMO     Reference Lab: [address] [place] [address]      Results: RESULTS SCANNED IN CONNECT CARE     TYPE + CROSSMATCH    Collection Time: 06/19/20  8:55 AM   Result Value Ref Range    Crossmatch Expiration 06/22/2020     ABO/Rh(D) A POSITIVE     Antibody screen NEG     Unit number [id number]     Blood component type  LR     Unit division 00     Status of unit ISSUED     Crossmatch result Compatible    HGB & HCT    Collection Time: 06/19/20  8:55 AM   Result Value Ref Range    HGB 7.0 (L) 11.7 - 15.4 g/dL    HCT 21.1 (L) 35.8 - 46.3 %   IRON    Collection Time: 06/19/20  8:55 AM   Result Value Ref Range    Iron 8 (L) 35 - 150 ug/dL   FERRITIN    Collection Time: 06/19/20  8:55 AM   Result Value Ref Range    Ferritin 147 8 - 388 NG/ML   TRANSFERRIN SATURATION    Collection Time: 06/19/20  8:55 AM   Result Value Ref Range    Iron 9 (L) 35 - 150 ug/dL    TIBC 186 (L) 250 - 450 ug/dL    Transferrin Saturation 5 (L) >20 %   FOLATE    Collection Time: 06/19/20  8:55 AM   Result Value Ref Range    Folate 50.8 (H) 3.1 - 17.5 ng/mL   VITAMIN B12    Collection Time: 06/19/20  8:55 AM   Result Value Ref Range    Vitamin B12 553 193 - 986 pg/mL   RETICULOCYTE COUNT    Collection Time: 06/19/20  8:55 AM   Result Value Ref Range    Reticulocyte count 3.2 (H) 0.3 - 2.0 %    Absolute Retic Cnt. 0.0731 0.026 - 0.095 M/ul    Immature Retic Fraction 15.4 3.0 - 15.9 %    Retic Hgb Conc. 31 29 - 35 pg   SODIUM, UR, RANDOM    Collection Time: 06/19/20  7:00 PM   Result Value Ref Range    Sodium,urine random 68 MMOL/L   CREATININE, UR, RANDOM    Collection Time: 06/19/20  7:00 PM   Result Value Ref Range    Creatinine, urine 25.80 mg/dL        All Micro Results     Procedure Component Value Units Date/Time    CULTURE, URINE [[id number]] Collected:  06/18/20 2217    Order Status:  Completed Specimen:  Urine from Clean catch Updated:  06/19/20 1637    CULTURE, BLOOD [[id number]]     Order Status:  Canceled Specimen:  Blood     CULTURE, BLOOD [[id number]]     Order Status:  Canceled Specimen:  Blood           Current Meds:  Current Facility-Administered Medications   Medication Dose Route Frequency    0.9% sodium chloride infusion 250 mL  250 mL IntraVENous PRN    cefTRIAXone (ROCEPHIN) 1 g in 0.9% sodium chloride (MBP/ADV) 50 mL  1 g IntraVENous Q24H    metoprolol succinate (TOPROL-XL) XL tablet 200 mg  200 mg Oral BID    hydrALAZINE (APRESOLINE) tablet 50 mg  50 mg Oral TID    clonazePAM (KlonoPIN) tablet 0.25 mg  0.25 mg Oral BID PRN    aspirin chewable tablet 81 mg  81 mg Oral DAILY    fluticasone propionate (FLONASE) 50 mcg/actuation nasal spray 2 Spray  2 Spray Both Nostrils DAILY    pantoprazole (PROTONIX) tablet 40 mg  40 mg Oral ACB    0.9% sodium chloride infusion  125 mL/hr IntraVENous CONTINUOUS    sodium chloride (NS) flush 5-40 mL  5-40 mL IntraVENous Q8H    sodium chloride (NS) flush 5-40 mL  5-40 mL IntraVENous PRN    acetaminophen (TYLENOL) tablet 650 mg  650 mg Oral Q4H PRN    ondansetron (ZOFRAN ODT) tablet 4 mg  4 mg Oral Q4H PRN    senna-docusate (PERICOLACE) 8.6-50 mg per tablet 1 Tab  1 Tab Oral BID PRN    influenza vaccine 2019-20 (6 mos+)(PF) (FLUARIX/FLULAVAL/FLUZONE QUAD) injection 0.5 mL  0.5 mL IntraMUSCular PRIOR TO DISCHARGE       Other Studies (last 24 hours):  Us Retroperitoneum Comp    Result Date: 6/19/2020  RENAL ULTRASOUND INDICATION:  Renal failure COMPARISON: None. TECHNIQUE: Real-time sonography of the kidneys, retroperitoneum and bladder was performed with multiple static images obtained. FINDINGS: The right kidney measures 11.0 cm and the left kidney measures 10.0 cm in length. Moderate bilateral hydronephrosis   Simple appearing cyst of the right kidney. The aorta and IVC are normal in caliber. Apparent debris dependently in the urinary bladder. IMPRESSION: Moderate bilateral hydronephrosis. Assessment and Plan:     Hospital Problems as of 6/19/2020 Date Reviewed: 9/20/2019          Codes Class Noted - Resolved POA    * (Principal) SHERIE (acute kidney injury) ([facility] ICD-10-CM: N17.9  ICD-9-CM: 584.9  6/18/2020 - Present Yes        Hyperkalemia ICD-10-CM: E87.5  ICD-9-CM: 276.7  6/18/2020 - Present Yes        Hyponatremia ICD-10-CM: E87.1  ICD-9-CM: 276.1  5/21/2019 - Present Yes        Type 2 diabetes with nephropathy ([facility] ICD-10-CM: E11.21  ICD-9-CM: 250.40, 583.81  5/10/2018 - Present Yes        UTI (urinary tract infection) ICD-10-CM: N39.0  ICD-9-CM: 599.0  4/10/2018 - Present Unknown        Essential hypertension ICD-10-CM: I10  ICD-9-CM: 401.9  Unknown - Present Yes        Anemia ICD-10-CM: D64.9  ICD-9-CM: 285. 9  Unknown - Present Yes            1 - SHERIE on CKD stage III, slightly improved. Creatinine down from 3.24 on admission to 2.72. Baseline creatinine around 1.5-1.6  2 - possible UTI  3 - acute on chronic normocytic anemia  4 - hypomagnesemia, Mg 1.1  5 - hyperkalemia, resolved.   Potassium down from 6.6 on admission to 4.4  6 - HTN  7 - DM type II  8 - depression and early dementia    PLAN:    · Continue hydration with IV fluids  · Provide magnesium supplementation as clinically appropriate  · Monitor renal function and electrolytes  · Obtain urine culture and empirically start IV ceftriaxone 1 g daily for suspected UTI  · Transfuse PRBCs as clinically appropriate with goal hemoglobin of at least 7  · Hold nephrotoxic medications    DC planning/Dispo: Home in 24 to 48 hours pending further clinical improvement  DVT ppx: With SCDs    Signed:   Maria Eugenia Stuart MD

## 2020-06-20 NOTE — PROGRESS NOTES
Pt anxious, confused, pulling on IV tubing and attempting to climb out of bed. Pt requests to speak with son Kunal Naranjo. Chapis Camilo called, # [phone], Pt able to talk with son.

## 2020-06-20 NOTE — PROGRESS NOTES
END OF SHIFT NOTE:    INTAKE/OUTPUT  06/19 0701 - 06/20 0700  In: 2366 [I.V.:2366]  Out: 550 [Urine:550]  Voiding: YES, incontinent briefs  Catheter: NO  Drain:              Flatus: Patient does have flatus present. Stool:  0 occurrences. Characteristics:  Stool Assessment  Stool Color: Brown  Stool Appearance: Loose  Stool Amount: Large  Stool Source/Status: Rectum    Emesis: 0 occurrences. Characteristics:        VITAL SIGNS  Patient Vitals for the past 12 hrs:   Temp Pulse Resp BP SpO2   06/20/20 1509 97.9 °F (36.6 °C) 80 18 179/69 93 %   06/20/20 1100 97.8 °F (36.6 °C) 85 18 158/65 94 %   06/20/20 0719 97.8 °F (36.6 °C) 88 18 147/65 96 %       Pain Assessment  Pain Intensity 1: 6 (06/20/20 1731)  Pain Location 1: Hand  Pain Intervention(s) 1: Medication (see MAR)  Patient Stated Pain Goal: 0    Ambulating  Yes, short distances to bed and chair. Shift report given to oncoming nurse at the bedside.     Edgard Welsh RN

## 2020-06-20 NOTE — PROGRESS NOTES
END OF SHIFT NOTE:  Pt with ongoing confusion throughout the night. Uncooperative and restless this AM.     INTAKE/OUTPUT  06/19 0701 - 06/20 0700  In: 2366 [I.V.:2366]  Out: 550 [Urine:550]  Voiding: YES  Catheter: NO  Drain:              Flatus: Patient does have flatus present. Stool:  0 occurrences. Characteristics:  Stool Assessment  Stool Color: Brown  Stool Appearance: Loose  Stool Amount: Large  Stool Source/Status: Rectum    Emesis: 0 occurrences. Characteristics:        VITAL SIGNS  Patient Vitals for the past 12 hrs:   Temp Pulse Resp BP SpO2   06/20/20 0332 98.4 °F (36.9 °C) 88 18 173/77 96 %   06/19/20 2340 98.6 °F (37 °C) 96 18 158/71 96 %   06/19/20 1947 98.2 °F (36.8 °C) 93 19 162/69 96 %       Pain Assessment  Pain Intensity 1: 4 (06/19/20 2030)  Pain Location 1: Head  Pain Intervention(s) 1: Medication (see MAR)  Patient Stated Pain Goal: 0    Ambulating  Yes    Shift report given to oncoming nurse at the bedside.     Jef Hill RN

## 2020-06-20 NOTE — PROGRESS NOTES
Problem: Falls - Risk of  Goal: *Absence of Falls  Description: Document Piter Mg Fall Risk and appropriate interventions in the flowsheet. Outcome: Progressing Towards Goal  Note: Fall Risk Interventions:  Mobility Interventions: Communicate number of staff needed for ambulation/transfer    Mentation Interventions: Bed/chair exit alarm    Medication Interventions: Patient to call before getting OOB    Elimination Interventions: Bed/chair exit alarm              Problem: Pressure Injury - Risk of  Goal: *Prevention of pressure injury  Description: Document Cruz Scale and appropriate interventions in the flowsheet. Outcome: Progressing Towards Goal  Note: Pressure Injury Interventions:  Sensory Interventions: Assess need for specialty bed    Moisture Interventions: Absorbent underpads, Apply protective barrier, creams and emollients    Activity Interventions: Pressure redistribution bed/mattress(bed type)    Mobility Interventions: HOB 30 degrees or less    Nutrition Interventions: Document food/fluid/supplement intake                     Problem: Patient Education: Go to Patient Education Activity  Goal: Patient/Family Education  Outcome: Progressing Towards Goal    Evaluated by PT this morning.

## 2020-06-21 LAB
ANION GAP SERPL CALC-SCNC: 10 MMOL/L (ref 7–16)
BACTERIA SPEC CULT: NORMAL
BACTERIA SPEC CULT: NORMAL
BUN SERPL-MCNC: 20 MG/DL (ref 8–23)
CALCIUM SERPL-MCNC: 6.4 MG/DL (ref 8.3–10.4)
CHLORIDE SERPL-SCNC: 119 MMOL/L (ref 98–107)
CO2 SERPL-SCNC: 15 MMOL/L (ref 21–32)
CREAT SERPL-MCNC: 1.81 MG/DL (ref 0.6–1)
ERYTHROCYTE [DISTWIDTH] IN BLOOD BY AUTOMATED COUNT: 13.8 % (ref 11.9–14.6)
GLUCOSE SERPL-MCNC: 94 MG/DL (ref 65–100)
HCT VFR BLD AUTO: 28.8 % (ref 35.8–46.3)
HGB BLD-MCNC: 9.1 G/DL (ref 11.7–15.4)
MCH RBC QN AUTO: 29.7 PG (ref 26.1–32.9)
MCHC RBC AUTO-ENTMCNC: 31.6 G/DL (ref 31.4–35)
MCV RBC AUTO: 94.1 FL (ref 79.6–97.8)
MM INDURATION POC: 0 MM (ref 0–5)
NRBC # BLD: 0 K/UL (ref 0–0.2)
PLATELET # BLD AUTO: 341 K/UL (ref 150–450)
PMV BLD AUTO: 9.9 FL (ref 9.4–12.3)
POTASSIUM SERPL-SCNC: 3.5 MMOL/L (ref 3.5–5.1)
PPD POC: NEGATIVE NEGATIVE
RBC # BLD AUTO: 3.06 M/UL (ref 4.05–5.2)
SERVICE CMNT-IMP: NORMAL
SODIUM SERPL-SCNC: 144 MMOL/L (ref 136–145)
WBC # BLD AUTO: 8.9 K/UL (ref 4.3–11.1)

## 2020-06-21 PROCEDURE — 74011250636 HC RX REV CODE- 250/636: Performed by: INTERNAL MEDICINE

## 2020-06-21 PROCEDURE — 85027 COMPLETE CBC AUTOMATED: CPT

## 2020-06-21 PROCEDURE — 96376 TX/PRO/DX INJ SAME DRUG ADON: CPT

## 2020-06-21 PROCEDURE — 36415 COLL VENOUS BLD VENIPUNCTURE: CPT

## 2020-06-21 PROCEDURE — 80048 BASIC METABOLIC PNL TOTAL CA: CPT

## 2020-06-21 PROCEDURE — 74011250637 HC RX REV CODE- 250/637: Performed by: INTERNAL MEDICINE

## 2020-06-21 PROCEDURE — 99218 HC RM OBSERVATION: CPT

## 2020-06-21 PROCEDURE — 74011000258 HC RX REV CODE- 258: Performed by: INTERNAL MEDICINE

## 2020-06-21 RX ADMIN — Medication 10 ML: at 17:10

## 2020-06-21 RX ADMIN — Medication 10 ML: at 05:43

## 2020-06-21 RX ADMIN — HYDRALAZINE HYDROCHLORIDE 50 MG: 50 TABLET, FILM COATED ORAL at 17:09

## 2020-06-21 RX ADMIN — HYDRALAZINE HYDROCHLORIDE 50 MG: 50 TABLET, FILM COATED ORAL at 22:04

## 2020-06-21 RX ADMIN — METOPROLOL SUCCINATE 200 MG: 100 TABLET, EXTENDED RELEASE ORAL at 22:04

## 2020-06-21 RX ADMIN — HYDRALAZINE HYDROCHLORIDE 50 MG: 50 TABLET, FILM COATED ORAL at 09:00

## 2020-06-21 RX ADMIN — Medication 10 ML: at 22:07

## 2020-06-21 RX ADMIN — ASPIRIN 81 MG 81 MG: 81 TABLET ORAL at 09:00

## 2020-06-21 RX ADMIN — METOPROLOL SUCCINATE 200 MG: 100 TABLET, EXTENDED RELEASE ORAL at 08:00

## 2020-06-21 RX ADMIN — SODIUM CHLORIDE 75 ML/HR: 9 INJECTION, SOLUTION INTRAVENOUS at 19:39

## 2020-06-21 RX ADMIN — CEFTRIAXONE SODIUM 1 G: 1 INJECTION, POWDER, FOR SOLUTION INTRAMUSCULAR; INTRAVENOUS at 17:09

## 2020-06-21 RX ADMIN — SODIUM CHLORIDE 125 ML/HR: 9 INJECTION, SOLUTION INTRAVENOUS at 00:12

## 2020-06-21 NOTE — PROGRESS NOTES
/71; HR 79. Pt asleep.  Scheduled Toprol xl 50 TID and Apresoline 50 mg TID  Dr. Bales Solders notified, no new orders

## 2020-06-21 NOTE — PROGRESS NOTES
Problem: Falls - Risk of  Goal: *Absence of Falls  Description: Document Airam Blood Fall Risk and appropriate interventions in the flowsheet.   Outcome: Progressing Towards Goal  Note: Fall Risk Interventions:  Mobility Interventions: Communicate number of staff needed for ambulation/transfer, Patient to call before getting OOB    Mentation Interventions: Adequate sleep, hydration, pain control, Door open when patient unattended, Evaluate medications/consider consulting pharmacy    Medication Interventions: Patient to call before getting OOB, Teach patient to arise slowly    Elimination Interventions: Call light in reach, Patient to call for help with toileting needs, Toileting schedule/hourly rounds

## 2020-06-21 NOTE — PROGRESS NOTES
END OF SHIFT NOTE:    INTAKE/OUTPUT  06/20 0701 - 06/21 0700  In: 2162 [I.V.:2804]  Out: -   Voiding: YES  Catheter: NO  Drain:              Flatus: Patient does have flatus present. Stool:  0 occurrences. Characteristics:  Stool Assessment  Stool Color: Brown  Stool Appearance: Loose  Stool Amount: Large  Stool Source/Status: Rectum    Emesis: 0 occurrences. Characteristics:        VITAL SIGNS  Patient Vitals for the past 12 hrs:   Temp Pulse Resp BP SpO2   06/21/20 1914 98.3 °F (36.8 °C) 82 21 183/50 95 %   06/21/20 1512 97.9 °F (36.6 °C) 78 19 175/79 90 %   06/21/20 1141 98 °F (36.7 °C) 83 18 189/85 90 %       Pain Assessment  Pain Intensity 1: 0 (06/21/20 1330)  Pain Location 1: Hand  Pain Intervention(s) 1: Medication (see MAR)  Patient Stated Pain Goal: 0    Ambulating  No  Patient slept most of the day. Occ behaviors noted. Shift report given to oncoming nurse at the bedside.     Mohinder Rain

## 2020-06-21 NOTE — PROGRESS NOTES
Hospitalist Progress Note     Admit Date:  2020  6:12 PM   Name:  Laney Vasquez   Age:  81 y.o.  :  1935   MRN:  [id number]   PCP:  Zehra Clarke MD  Treatment Team: Attending Provider: Tim Narayan MD; Primary Nurse: Cortez Webb, RN; Utilization Review: Saran Bolivar RN; Care Manager: Champ Aleman RN; Primary Nurse: Luiz Tom    Subjective: The patient is an 81-year-old  lady with HTN, DM type II, CKD stage III with baseline creatinine of 1.5-1.6, chronic normocytic anemia with recent baseline hemoglobin of 8.1, depression, history of kidney stones, early dementia, is presently managed for SHERIE on CKD, acute on chronic anemia, UTI, hypomagnesemia and hyperkalemia.  She is presently being hydrated with IV fluids and was initiated on IV ceftriaxone.  She denies any significant complaints, actually denies any significant dysuria, shortness of breath or cough.  Her main complaint is generalized weakness.  As per the son, she was also confused, but now her mental status is back to baseline and she is alert.  Hypokalemia has resolved, as the potassium came down from 6.6 on admission to 3.5. [facility] renal function is also improved, with her creatinine down from 3.24 on admission to 1. 81.  Her hemoglobin improved from 6.7 to 8.3 after administration of 1 PRBC, and it is actually up to 9.1 today. She was seen by physical therapy and was deemed appropriate for discharge to SNF for rehabilitation. The patient is actually agreeable to go to rehab.     Objective:     Patient Vitals for the past 24 hrs:   Temp Pulse Resp BP SpO2   20 1512 97.9 °F (36.6 °C) 78 19 175/79 90 %   20 1141 98 °F (36.7 °C) 83 18 189/85 90 %   20 0708 99.1 °F (37.3 °C) 78 18 179/79 95 %   20 0518 98.2 °F (36.8 °C) 79 18 184/71 91 %   20 2332 97.4 °F (36.3 °C) 87 18 90/70 93 %   203 98.1 °F (36.7 °C) 74 18 162/70 97 %     Oxygen Therapy  O2 Sat (%): 90 % (06/21/20 1512)  Pulse via Oximetry: 75 beats per minute (06/18/20 2159)  O2 Device: Room air (06/21/20 1512)    Intake/Output Summary (Last 24 hours) at 6/21/2020 1730  Last data filed at 6/21/2020 0603  Gross per 24 hour   Intake 2804 ml   Output    Net 2804 ml         General:    Well nourished. Alert. CV:   RRR. No murmur, rub, or gallop. Lungs:   Clear to auscultation bilaterally. No wheezing, rhonchi, or rales. Abdomen:   Soft, nontender, nondistended. Extremities: Warm and dry. No cyanosis or edema. Skin:     No rashes or jaundice. Data Review:  I have reviewed all labs, meds, telemetry events, and studies from the last 24 hours.     Recent Results (from the past 24 hour(s))   METABOLIC PANEL, BASIC    Collection Time: 06/21/20  4:08 AM   Result Value Ref Range    Sodium 144 136 - 145 mmol/L    Potassium 3.5 3.5 - 5.1 mmol/L    Chloride 119 (H) 98 - 107 mmol/L    CO2 15 (L) 21 - 32 mmol/L    Anion gap 10 7 - 16 mmol/L    Glucose 94 65 - 100 mg/dL    BUN 20 8 - 23 MG/DL    Creatinine 1.81 (H) 0.6 - 1.0 MG/DL    GFR est AA 34 (L) >60 ml/min/1.73m2    GFR est non-AA 28 (L) >60 ml/min/1.73m2    Calcium 6.4 (L) 8.3 - 10.4 MG/DL   CBC W/O DIFF    Collection Time: 06/21/20  4:08 AM   Result Value Ref Range    WBC 8.9 4.3 - 11.1 K/uL    RBC 3.06 (L) 4.05 - 5.2 M/uL    HGB 9.1 (L) 11.7 - 15.4 g/dL    HCT 28.8 (L) 35.8 - 46.3 %    MCV 94.1 79.6 - 97.8 FL    MCH 29.7 26.1 - 32.9 PG    MCHC 31.6 31.4 - 35.0 g/dL    RDW 13.8 11.9 - 14.6 %    PLATELET 249 709 - 073 K/uL    MPV 9.9 9.4 - 12.3 FL    ABSOLUTE NRBC 0.00 0.0 - 0.2 K/uL        All Micro Results     Procedure Component Value Units Date/Time    CULTURE, URINE [[id number]] Collected:  06/18/20 4529    Order Status:  Completed Specimen:  Urine from Clean catch Updated:  06/21/20 1010     Special Requests: NO SPECIAL REQUESTS        Culture result:       >100,000 COLONIES/mL MIXED SKIN BATSHEVA ISOLATED                  THREE OR MORE TYPES OF ORGANISMS ARE PRESENT. THIS IS INDICATIVE OF CONTAMINATION DUE TO IMPROPER COLLECTION TECHNIQUE. PLEASE REPEAT COLLECTION UNLESS PATIENT HAS STARTED ANTIBIOTIC TREATMENT. EMERGENT DISEASE PANEL [[id number]] Collected:  06/20/20 1812    Order Status:  Completed Updated:  06/20/20 1904    CULTURE, BLOOD [[id number]]     Order Status:  Canceled Specimen:  Blood     CULTURE, BLOOD [[id number]]     Order Status:  Canceled Specimen:  Blood           Current Meds:  Current Facility-Administered Medications   Medication Dose Route Frequency    tuberculin injection 5 Units  5 Units IntraDERMal ONCE    0.9% sodium chloride infusion 250 mL  250 mL IntraVENous PRN    cefTRIAXone (ROCEPHIN) 1 g in 0.9% sodium chloride (MBP/ADV) 50 mL  1 g IntraVENous Q24H    metoprolol succinate (TOPROL-XL) tablet 200 mg  200 mg Oral BID    hydrALAZINE (APRESOLINE) tablet 50 mg  50 mg Oral TID    clonazePAM (KlonoPIN) tablet 0.25 mg  0.25 mg Oral BID PRN    aspirin chewable tablet 81 mg  81 mg Oral DAILY    fluticasone propionate (FLONASE) 50 mcg/actuation nasal spray 2 Spray  2 Spray Both Nostrils DAILY    pantoprazole (PROTONIX) tablet 40 mg  40 mg Oral ACB    0.9% sodium chloride infusion  75 mL/hr IntraVENous CONTINUOUS    sodium chloride (NS) flush 5-40 mL  5-40 mL IntraVENous Q8H    sodium chloride (NS) flush 5-40 mL  5-40 mL IntraVENous PRN    acetaminophen (TYLENOL) tablet 650 mg  650 mg Oral Q4H PRN    ondansetron (ZOFRAN ODT) tablet 4 mg  4 mg Oral Q4H PRN    senna-docusate (PERICOLACE) 8.6-50 mg per tablet 1 Tab  1 Tab Oral BID PRN    influenza vaccine 2019-20 (6 mos+)(PF) (FLUARIX/FLULAVAL/FLUZONE QUAD) injection 0.5 mL  0.5 mL IntraMUSCular PRIOR TO DISCHARGE       Other Studies (last 24 hours):  No results found.     Assessment and Plan:     Hospital Problems as of 6/21/2020 Date Reviewed: 9/20/2019          Codes Class Noted - Resolved POA    * (Principal) SHERIE (acute kidney [facility]) ([facility] ICD-10-CM: N17.9  ICD-9-CM: 584.9  6/18/2020 - Present Yes        Hyperkalemia ICD-10-CM: E87.5  ICD-9-CM: 276.7  6/18/2020 - Present Yes        Hyponatremia ICD-10-CM: E87.1  ICD-9-CM: 276.1  5/21/2019 - Present Yes        Type 2 diabetes with nephropathy ([facility] ICD-10-CM: E11.21  ICD-9-CM: 250.40, 583.81  5/10/2018 - Present Yes        UTI (urinary tract infection) ICD-10-CM: N39.0  ICD-9-CM: 599.0  4/10/2018 - Present Unknown        Essential hypertension ICD-10-CM: I10  ICD-9-CM: 401.9  Unknown - Present Yes        Anemia ICD-10-CM: D64.9  ICD-9-CM: 285. 9  Unknown - Present Yes              1 - SHERIE on CKD stage III, improved.  Creatinine down from 3.24 on admission to 1. 81.  Baseline creatinine around 1.5-1.6  2 - possible UTI  3 - acute on chronic normocytic anemia  4 - hypomagnesemia, resolved. Mg up to 2.0  5 - hyperkalemia, resolved.  Potassium down from 6.6 on admission to 3.5  6 - HTN  7 - DM type II  8 - depression and early dementia  9 - metabolic encephalopathy likely due to SHERIE and suspected UTI, now resolved     PLAN:    · Continue hydration with IV fluids  · Provide magnesium supplementation as clinically appropriate  · Monitor renal function and electrolytes  · Follow urine culture and continue IV ceftriaxone 1 g daily for suspected UTI  · Transfuse PRBCs as clinically appropriate with goal hemoglobin of at least 7  · Hold nephrotoxic medications  · Test for COVID-19 and place PPD, as the patient is interested in rehab     DC planning/Dispo: SNF for rehab in 24 to 48 hours pending further clinical improvement  DVT ppx: With SCDs        Signed:   Susanna Yo MD

## 2020-06-21 NOTE — DISCHARGE INSTRUCTIONS
NUTRITION       Continue Oral Nutrition Supplement (ONS) at discharge. Recommend Glucerna or a comparable/similar product Twice daily for 30 days unless otherwise directed by your Primary Care Physician.      Logan Lee RD, LD

## 2020-06-21 NOTE — PROGRESS NOTES
Nutrition Assessment for:   Best Practice Alert for Malnutrition Screening Tool: Recently Lost Weight Without Trying: Yes, If Yes, How Much Weight Loss: Unsure, Eating Poorly Due to Decreased Appetite: Yes    Assessment: This assessment was completed remotely. PMH: HTN, dementia, depression, CKD, chronic anemia, recent treatment for UTI  Presented with weakness and lack of interest in eating. Findings of SHERIE on CKD  DIET REGULAR    Insufficient intake data. Unable to assess if needs are met. Defer assessment until more data available. Taled to pt via room phone, She says she is being brought good food but there is only just so much she can eat, and that she doesn't eat much \"no ways\". She says she had a good dinner today of which she ate the apple pie and drank some water and pepsi but is going to send the rest back. She says she loves buttermilk and at home she sometimes drinks a glass in the middle of the night. She as never tired a nutritional supplement and doesn't know if she would drink one or not. Son, Hadley Alvarado, contacted via phone. He reports that at baseline pt eats 2 meals a day. He brings her chicken fingers or fired fisgh and vegetables like butter beans and fried okra from [employer]. She usually make that amount of food last for 3 meals. Sometimes she will eat an egg at breakfast as a thirs meal. She will snack on fig newtons or vanilla wqfers between mels. He says that overall the pt is very stubborn and will not what she is supposed to do. She has a reference for diet Pepsis and tea but he tries to get her to drink more water. He noticed some decline in her intake a couple of week ago with a significant decline to almost nothing (bites and sips) for 2-3 days PTA. He has not noticed any weight loss. Anthropometrics:  Height: 5' 2\" (157.5 cm), Weight: 65.2 kg (143 lb 12.8 oz), Weight Source: Bed, Body mass index is 26.3 kg/m². BMI class of overweight. Weight hx per EMR ( Based on connect care functionality, RD cannot know if these weight are actual versus stated):  WT / BMI WEIGHT   6/18/2020 143 lb 12.8 oz   6/4/2020 147 lb   12/26/2019 147 lb   12/23/2019 147 lb   9/20/2019 146 lb   9/20/2019 146 lb   6/7/2019 147 lb     3 % change in wt within ~ 2 weeks likely r/t fluid change. Weight loss is considered in th mild range. Macronutrient needs: (using IBW (Ideal body weight) 50 kg)  EER: 8852-8302 kcal/day (25-30 kcal/kg)  EPR: 50-60 g/day (1-1.2 g/kg)      Nutrition Diagnosis:  Predicted inadequate oral intake related to decreased appetite and dementia  as evidenced by reproted intake by son and 1 meal by pt < 25% of kcal and protien needs. for at least 1 week      Nutrition Intervention:  Meals and snacks: Continue current diet. If glucoses beome uncontrolled, then could add CCHO feature. Include butter milk once daily. Medical food supplement therapy: Commercial beverage- Glucerna shake tid  Discharge Nutrition Plan: Continue Oral Nutrition Supplement (ONS) at discharge. Recommend Glucerna or a comparable/similar product Twice daily for 30 days unless otherwise directed by your Primary Care Physician.     Linh Rosario, [address], [facility] [address] [place] [address]

## 2020-06-21 NOTE — PROGRESS NOTES
Hospitalist Progress Note     Admit Date:  2020  6:12 PM   Name:  Laney Vasquez   Age:  81 y.o.  :  1935   MRN:  [id number]   PCP:  Zehra Clarke MD  Treatment Team: Attending Provider: Tim Narayan MD; Primary Nurse: Cortez Webb, RN; Utilization Review: Saran Bolivar RN; Care Manager: Champ Aleman RN; Physical Therapist: Marie Gann PT    Subjective: The patient is an 21-year-old  lady with HTN, DM type II, CKD stage III with baseline creatinine of 1.5-1.6, chronic normocytic anemia with recent baseline hemoglobin of 8.1, depression, history of kidney stones, early dementia, is presently managed for SHERIE on CKD, acute on chronic anemia, UTI, hypomagnesemia and hyperkalemia. She is presently being hydrated with IV fluids and was initiated on IV ceftriaxone. She denies any significant complaints, actually denies any significant dysuria, shortness of breath or cough. Her main complaint is generalized weakness. As per the son, she was also confused, but now her mental status is back to baseline and she is alert. Hypokalemia has resolved, as the potassium came down from 6.6 on admission to 3.4. Her renal function is also improved, with her creatinine down from 3.24 on admission to 2.11. Her hemoglobin improved from 6.7 yesterday to 8.3 after administration of 1 PRBC. She was seen by physical therapy and was deemed appropriate for discharge to SNF for rehabilitation. The patient is actually agreeable to go to rehab.     Objective:     Patient Vitals for the past 24 hrs:   Temp Pulse Resp BP SpO2   20 1509 97.9 °F (36.6 °C) 80 18 179/69 93 %   20 1100 97.8 °F (36.6 °C) 85 18 158/65 94 %   20 0719 97.8 °F (36.6 °C) 88 18 147/65 96 %   20 0332 98.4 °F (36.9 °C) 88 18 173/77 96 %   20 2340 98.6 °F (37 °C) 96 18 158/71 96 %     Oxygen Therapy  O2 Sat (%): 93 % (20 1509)  Pulse via Oximetry: 75 beats per minute (06/18/20 4289)  O2 Device: Room air (06/20/20 0735)    Intake/Output Summary (Last 24 hours) at 6/20/2020 5548  Last data filed at 6/20/2020 0542  Gross per 24 hour   Intake 1266 ml   Output    Net 1266 ml         General:    Well nourished. Alert. CV:   RRR. No murmur, rub, or gallop. Lungs:   Clear to auscultation bilaterally. No wheezing, rhonchi, or rales. Abdomen:   Soft, nontender, nondistended. Extremities: Warm and dry. No cyanosis or edema. Skin:     No rashes or jaundice. Data Review:  I have reviewed all labs, meds, telemetry events, and studies from the last 24 hours.     Recent Results (from the past 24 hour(s))   METABOLIC PANEL, BASIC    Collection Time: 06/20/20  5:23 AM   Result Value Ref Range    Sodium 138 136 - 145 mmol/L    Potassium 3.4 (L) 3.5 - 5.1 mmol/L    Chloride 112 (H) 98 - 107 mmol/L    CO2 16 (L) 21 - 32 mmol/L    Anion gap 10 7 - 16 mmol/L    Glucose 117 (H) 65 - 100 mg/dL    BUN 27 (H) 8 - 23 MG/DL    Creatinine 2.11 (H) 0.6 - 1.0 MG/DL    GFR est AA 29 (L) >60 ml/min/1.73m2    GFR est non-AA 24 (L) >60 ml/min/1.73m2    Calcium 6.9 (L) 8.3 - 10.4 MG/DL   CBC W/O DIFF    Collection Time: 06/20/20  5:23 AM   Result Value Ref Range    WBC 7.0 4.3 - 11.1 K/uL    RBC 2.85 (L) 4.05 - 5.2 M/uL    HGB 8.3 (L) 11.7 - 15.4 g/dL    HCT 26.2 (L) 35.8 - 46.3 %    MCV 91.9 79.6 - 97.8 FL    MCH 29.1 26.1 - 32.9 PG    MCHC 31.7 31.4 - 35.0 g/dL    RDW 13.3 11.9 - 14.6 %    PLATELET 948 157 - 580 K/uL    MPV 9.4 9.4 - 12.3 FL    ABSOLUTE NRBC 0.00 0.0 - 0.2 K/uL   MAGNESIUM    Collection Time: 06/20/20  5:23 AM   Result Value Ref Range    Magnesium 2.0 1.8 - 2.4 mg/dL        All Micro Results     Procedure Component Value Units Date/Time    EMERGENT DISEASE PANEL [[id number]] Collected:  06/20/20 1812    Order Status:  Completed Updated:  06/20/20 1904    CULTURE, URINE [[id number]] Collected:  06/18/20 2217    Order Status:  Completed Specimen:  Urine from Clean catch Updated:  06/20/20 1050     Special Requests: NO SPECIAL REQUESTS        Culture result:       <10,000  COLONIES/mL  NORMAL SKIN BATSHEVA ISOLATED      CULTURE, BLOOD [[id number]]     Order Status:  Canceled Specimen:  Blood     CULTURE, BLOOD [[id number]]     Order Status:  Canceled Specimen:  Blood           Current Meds:  Current Facility-Administered Medications   Medication Dose Route Frequency    tuberculin injection 5 Units  5 Units IntraDERMal ONCE    0.9% sodium chloride infusion 250 mL  250 mL IntraVENous PRN    cefTRIAXone (ROCEPHIN) 1 g in 0.9% sodium chloride (MBP/ADV) 50 mL  1 g IntraVENous Q24H    metoprolol succinate (TOPROL-XL) XL tablet 200 mg  200 mg Oral BID    hydrALAZINE (APRESOLINE) tablet 50 mg  50 mg Oral TID    clonazePAM (KlonoPIN) tablet 0.25 mg  0.25 mg Oral BID PRN    aspirin chewable tablet 81 mg  81 mg Oral DAILY    fluticasone propionate (FLONASE) 50 mcg/actuation nasal spray 2 Spray  2 Spray Both Nostrils DAILY    pantoprazole (PROTONIX) tablet 40 mg  40 mg Oral ACB    0.9% sodium chloride infusion  125 mL/hr IntraVENous CONTINUOUS    sodium chloride (NS) flush 5-40 mL  5-40 mL IntraVENous Q8H    sodium chloride (NS) flush 5-40 mL  5-40 mL IntraVENous PRN    acetaminophen (TYLENOL) tablet 650 mg  650 mg Oral Q4H PRN    ondansetron (ZOFRAN ODT) tablet 4 mg  4 mg Oral Q4H PRN    senna-docusate (PERICOLACE) 8.6-50 mg per tablet 1 Tab  1 Tab Oral BID PRN    influenza vaccine 2019-20 (6 mos+)(PF) (FLUARIX/FLULAVAL/FLUZONE QUAD) injection 0.5 mL  0.5 mL IntraMUSCular PRIOR TO DISCHARGE       Other Studies (last 24 hours):  No results found.     Assessment and Plan:     Hospital Problems as of 6/20/2020 Date Reviewed: 9/20/2019          Codes Class Noted - Resolved POA    * (Principal) SHERIE (acute kidney injury) ([facility] ICD-10-CM: N17.9  ICD-9-CM: 584.9  6/18/2020 - Present Yes        Hyperkalemia ICD-10-CM: E87.5  ICD-9-CM: 276.7  6/18/2020 - Present Yes        Hyponatremia ICD-10-CM: E87.1  ICD-9-CM: 276.1  5/21/2019 - Present Yes        Type 2 diabetes with nephropathy ([facility] ICD-10-CM: E11.21  ICD-9-CM: 250.40, 583.81  5/10/2018 - Present Yes        UTI (urinary tract infection) ICD-10-CM: N39.0  ICD-9-CM: 599.0  4/10/2018 - Present Unknown        Essential hypertension ICD-10-CM: I10  ICD-9-CM: 401.9  Unknown - Present Yes        Anemia ICD-10-CM: D64.9  ICD-9-CM: 285. 9  Unknown - Present Yes              1 - SHERIE on CKD stage III, improved. Creatinine down from 3.24 on admission to 2. 11. Baseline creatinine around 1.5-1.6  2 - possible UTI  3 - acute on chronic normocytic anemia  4 - hypomagnesemia, resolved. Mg up to 2.0  5 - hyperkalemia, resolved. Potassium down from 6.6 on admission to 3.4  6 - HTN  7 - DM type II  8 - depression and early dementia     PLAN:    · Continue hydration with IV fluids  · Provide magnesium supplementation as clinically appropriate  · Monitor renal function and electrolytes  · Follow urine culture and continue IV ceftriaxone 1 g daily for suspected UTI  · Transfuse PRBCs as clinically appropriate with goal hemoglobin of at least 7  · Hold nephrotoxic medications  · Test for COVID-19 and place PPD, as the patient is interested in rehab     DC planning/Dispo: SNF for rehab in 24 to 48 hours pending further clinical improvement  DVT ppx: With SCDs      Signed:   Lea Landau, MD

## 2020-06-21 NOTE — PROGRESS NOTES
END OF SHIFT NOTE:    INTAKE/OUTPUT  06/20 0701 - 06/21 0700  In: 5553 [I.V.:2804]  Out: -   Voiding: YES  Catheter: NO  Drain:              Flatus: Patient does have flatus present. Stool:  0 occurrences. Characteristics:  Stool Assessment  Stool Color: Brown  Stool Appearance: Loose  Stool Amount: Large  Stool Source/Status: Rectum    Emesis: 0 occurrences. Characteristics:        VITAL SIGNS  Patient Vitals for the past 12 hrs:   Temp Pulse Resp BP SpO2   06/21/20 0518 98.2 °F (36.8 °C) 79 18 184/71 91 %   06/20/20 2332 97.4 °F (36.3 °C) 87 18 90/70 93 %   06/20/20 2123 98.1 °F (36.7 °C) 74 18 162/70 97 %       Pain Assessment  Pain Intensity 1: 6 (06/20/20 1731)  Pain Location 1: Hand  Pain Intervention(s) 1: Medication (see MAR)  Patient Stated Pain Goal: 0    Ambulating  No    Shift report given to oncoming nurse at the bedside.     Le Duff RN

## 2020-06-22 LAB
ANION GAP SERPL CALC-SCNC: 11 MMOL/L (ref 7–16)
BUN SERPL-MCNC: 14 MG/DL (ref 8–23)
CALCIUM SERPL-MCNC: 6.4 MG/DL (ref 8.3–10.4)
CHLORIDE SERPL-SCNC: 119 MMOL/L (ref 98–107)
CO2 SERPL-SCNC: 16 MMOL/L (ref 21–32)
CREAT SERPL-MCNC: 1.59 MG/DL (ref 0.6–1)
ERYTHROCYTE [DISTWIDTH] IN BLOOD BY AUTOMATED COUNT: 13.9 % (ref 11.9–14.6)
GLUCOSE SERPL-MCNC: 99 MG/DL (ref 65–100)
HCT VFR BLD AUTO: 28.2 % (ref 35.8–46.3)
HGB BLD-MCNC: 9.1 G/DL (ref 11.7–15.4)
MCH RBC QN AUTO: 29.6 PG (ref 26.1–32.9)
MCHC RBC AUTO-ENTMCNC: 32.3 G/DL (ref 31.4–35)
MCV RBC AUTO: 91.9 FL (ref 79.6–97.8)
MM INDURATION POC: 0 MM (ref 0–0)
NRBC # BLD: 0 K/UL (ref 0–0.2)
PLATELET # BLD AUTO: 401 K/UL (ref 150–450)
PMV BLD AUTO: 9.5 FL (ref 9.4–12.3)
POTASSIUM SERPL-SCNC: 3.1 MMOL/L (ref 3.5–5.1)
PPD POC: NEGATIVE NEGATIVE
RBC # BLD AUTO: 3.07 M/UL (ref 4.05–5.2)
SODIUM SERPL-SCNC: 146 MMOL/L (ref 136–145)
WBC # BLD AUTO: 11.2 K/UL (ref 4.3–11.1)

## 2020-06-22 PROCEDURE — 96375 TX/PRO/DX INJ NEW DRUG ADDON: CPT

## 2020-06-22 PROCEDURE — 85027 COMPLETE CBC AUTOMATED: CPT

## 2020-06-22 PROCEDURE — 74011250637 HC RX REV CODE- 250/637: Performed by: INTERNAL MEDICINE

## 2020-06-22 PROCEDURE — 97166 OT EVAL MOD COMPLEX 45 MIN: CPT

## 2020-06-22 PROCEDURE — 99218 HC RM OBSERVATION: CPT

## 2020-06-22 PROCEDURE — 80048 BASIC METABOLIC PNL TOTAL CA: CPT

## 2020-06-22 PROCEDURE — 97535 SELF CARE MNGMENT TRAINING: CPT

## 2020-06-22 PROCEDURE — 74011250636 HC RX REV CODE- 250/636: Performed by: INTERNAL MEDICINE

## 2020-06-22 PROCEDURE — 74011000258 HC RX REV CODE- 258: Performed by: INTERNAL MEDICINE

## 2020-06-22 PROCEDURE — 36415 COLL VENOUS BLD VENIPUNCTURE: CPT

## 2020-06-22 PROCEDURE — 96376 TX/PRO/DX INJ SAME DRUG ADON: CPT

## 2020-06-22 RX ORDER — DEXTROSE MONOHYDRATE 50 MG/ML
50 INJECTION, SOLUTION INTRAVENOUS CONTINUOUS
Status: DISCONTINUED | OUTPATIENT
Start: 2020-06-22 | End: 2020-06-23

## 2020-06-22 RX ORDER — POTASSIUM CHLORIDE 14.9 MG/ML
20 INJECTION INTRAVENOUS EVERY 4 HOURS
Status: COMPLETED | OUTPATIENT
Start: 2020-06-22 | End: 2020-06-23

## 2020-06-22 RX ADMIN — HYDRALAZINE HYDROCHLORIDE 50 MG: 50 TABLET, FILM COATED ORAL at 08:22

## 2020-06-22 RX ADMIN — CEFTRIAXONE SODIUM 1 G: 1 INJECTION, POWDER, FOR SOLUTION INTRAMUSCULAR; INTRAVENOUS at 17:55

## 2020-06-22 RX ADMIN — DEXTROSE MONOHYDRATE 50 ML/HR: 5 INJECTION, SOLUTION INTRAVENOUS at 22:17

## 2020-06-22 RX ADMIN — POTASSIUM CHLORIDE 20 MEQ: 14.9 INJECTION, SOLUTION INTRAVENOUS at 21:00

## 2020-06-22 RX ADMIN — Medication 10 ML: at 21:24

## 2020-06-22 RX ADMIN — ASPIRIN 81 MG 81 MG: 81 TABLET ORAL at 08:22

## 2020-06-22 RX ADMIN — HYDRALAZINE HYDROCHLORIDE 50 MG: 50 TABLET, FILM COATED ORAL at 17:55

## 2020-06-22 RX ADMIN — CLONAZEPAM 0.25 MG: 1 TABLET ORAL at 21:30

## 2020-06-22 RX ADMIN — METOPROLOL SUCCINATE 200 MG: 100 TABLET, EXTENDED RELEASE ORAL at 08:22

## 2020-06-22 RX ADMIN — METOPROLOL SUCCINATE 200 MG: 100 TABLET, EXTENDED RELEASE ORAL at 21:00

## 2020-06-22 RX ADMIN — Medication 10 ML: at 14:58

## 2020-06-22 RX ADMIN — HYDRALAZINE HYDROCHLORIDE 50 MG: 50 TABLET, FILM COATED ORAL at 21:00

## 2020-06-22 RX ADMIN — PANTOPRAZOLE SODIUM 40 MG: 40 TABLET, DELAYED RELEASE ORAL at 05:26

## 2020-06-22 NOTE — PROGRESS NOTES
END OF SHIFT NOTE:    INTAKE/OUTPUT  06/21 0701 - 06/22 0700  In: 1200 [I.V.:1200]  Out: -   Voiding: YES  Catheter: NO  Drain:              Flatus: Patient does have flatus present. Stool:  1 occurrences. Characteristics:  Stool Assessment  Stool Color: Brown  Stool Appearance: Loose, Soft  Stool Amount: Medium  Stool Source/Status: Rectum    Emesis: 0 occurrences. Characteristics:        VITAL SIGNS  Patient Vitals for the past 12 hrs:   Temp Pulse Resp BP SpO2   06/22/20 0250 99.2 °F (37.3 °C) 76 19 153/73 93 %   06/21/20 2217 99.1 °F (37.3 °C) 83 20 165/77 96 %   06/21/20 1914 98.3 °F (36.8 °C) 82 21 183/50 95 %       Pain Assessment  Pain Intensity 1: 0 (06/21/20 1330)  Pain Location 1: Hand  Pain Intervention(s) 1: Medication (see MAR)  Patient Stated Pain Goal: 0    Ambulating  No    Shift report given to oncoming nurse at the bedside.     Joaquín Vela RN

## 2020-06-22 NOTE — PROGRESS NOTES
Hospitalist Progress Note     Admit Date:  2020  6:12 PM   Name:  Nguyen Stover   Age:  81 y.o.  :  1935   MRN:  [id number]   PCP:  Arlet Shabazz MD  Treatment Team: Attending Provider: Khanh Mackey MD; Primary Nurse: Alessandra Mclaughlin RN; Utilization Review: Joel Calle RN; Care Manager: Zayra Ornelas RN    Subjective:       As per prior documentation:  \" The patient is an 41-year-old  lady with HTN, DM type II, CKD stage III with baseline creatinine of 1.5-1.6, chronic normocytic anemia with recent baseline hemoglobin of 8.1, depression, history of kidney stones, early dementia, is presently managed for SHERIE on CKD, acute on chronic anemia, UTI, hypomagnesemia and hyperkalemia.  She is presently being hydrated with IV fluids and was initiated on IV ceftriaxone.  She denies any significant complaints, actually denies any significant dysuria, shortness of breath or cough.  Her main complaint is generalized weakness.  As per the son, she was also confused, but now her mental status is back to baseline and she is alert. Gianluca Axon has resolved, as the potassium came down from 6.6 on admission to 3.5. Velta Katalina renal function is also improved, with her creatinine down from 3.24 on admission to 1. 80.  Her hemoglobin improved from 6.7 to 8.3 after administration of 1 PRBC, and it is actually up to 9.1 today.  She was seen by physical therapy and was deemed appropriate for discharge to SNF for rehabilitation.  The patient is actually agreeable to go to rehab. \"      2020new patient for me today. She is awaiting rehab placement; no adverse overnight events noted.       Objective:     Patient Vitals for the past 24 hrs:   Temp Pulse Resp BP SpO2   20 0250 99.2 °F (37.3 °C) 76 19 153/73 93 %   20 2217 99.1 °F (37.3 °C) 83 20 165/77 96 %   20 1914 98.3 °F (36.8 °C) 82 21 183/50 95 %   20 1512 97.9 °F (36.6 °C) 78 19 175/79 90 % 06/21/20 1141 98 °F (36.7 °C) 83 18 189/85 90 %     Oxygen Therapy  O2 Sat (%): 93 % (06/22/20 0250)  Pulse via Oximetry: 75 beats per minute (06/18/20 2159)  O2 Device: Room air (06/21/20 1512)    Intake/Output Summary (Last 24 hours) at 6/22/2020 0720  Last data filed at 6/21/2020 1715  Gross per 24 hour   Intake 1200 ml   Output    Net 1200 ml         General:    Elderly frail in no obvious cardiopulmonary distress  CV:   RRR. No murmur, rub, or gallop. Lungs:   CTAB. No wheezing, rhonchi, or rales. Abdomen:   Soft, nontender, nondistended. Extremities: Warm and dry. No cyanosis or edema. Skin:     No rashes or jaundice. Data Review:  I have reviewed all labs, meds, telemetry events, and studies from the last 24 hours.     Recent Results (from the past 24 hour(s))   PLEASE READ & DOCUMENT PPD TEST IN 24 HRS    Collection Time: 06/21/20  6:02 PM   Result Value Ref Range    PPD Negative Negative    mm Induration 0 0 - 5 mm   METABOLIC PANEL, BASIC    Collection Time: 06/22/20  3:19 AM   Result Value Ref Range    Sodium 146 (H) 136 - 145 mmol/L    Potassium 3.1 (L) 3.5 - 5.1 mmol/L    Chloride 119 (H) 98 - 107 mmol/L    CO2 16 (L) 21 - 32 mmol/L    Anion gap 11 7 - 16 mmol/L    Glucose 99 65 - 100 mg/dL    BUN 14 8 - 23 MG/DL    Creatinine 1.59 (H) 0.6 - 1.0 MG/DL    GFR est AA 40 (L) >60 ml/min/1.73m2    GFR est non-AA 33 (L) >60 ml/min/1.73m2    Calcium 6.4 (L) 8.3 - 10.4 MG/DL   CBC W/O DIFF    Collection Time: 06/22/20  3:19 AM   Result Value Ref Range    WBC 11.2 (H) 4.3 - 11.1 K/uL    RBC 3.07 (L) 4.05 - 5.2 M/uL    HGB 9.1 (L) 11.7 - 15.4 g/dL    HCT 28.2 (L) 35.8 - 46.3 %    MCV 91.9 79.6 - 97.8 FL    MCH 29.6 26.1 - 32.9 PG    MCHC 32.3 31.4 - 35.0 g/dL    RDW 13.9 11.9 - 14.6 %    PLATELET 129 931 - 005 K/uL    MPV 9.5 9.4 - 12.3 FL    ABSOLUTE NRBC 0.00 0.0 - 0.2 K/uL        All Micro Results     Procedure Component Value Units Date/Time    CULTURE, URINE [[id number]] Collected:  06/18/20 2217    Order Status:  Completed Specimen:  Urine from Clean catch Updated:  06/21/20 1010     Special Requests: NO SPECIAL REQUESTS        Culture result:       >100,000 COLONIES/mL MIXED SKIN BATSHEVA ISOLATED                  THREE OR MORE TYPES OF ORGANISMS ARE PRESENT. THIS IS INDICATIVE OF CONTAMINATION DUE TO IMPROPER COLLECTION TECHNIQUE. PLEASE REPEAT COLLECTION UNLESS PATIENT HAS STARTED ANTIBIOTIC TREATMENT.           EMERGENT DISEASE PANEL [[id number]] Collected:  06/20/20 1812    Order Status:  Completed Updated:  06/20/20 1904    CULTURE, BLOOD [[id number]]     Order Status:  Canceled Specimen:  Blood     CULTURE, BLOOD [[id number]]     Order Status:  Canceled Specimen:  Blood           Current Meds:  Current Facility-Administered Medications   Medication Dose Route Frequency    0.9% sodium chloride infusion 250 mL  250 mL IntraVENous PRN    cefTRIAXone (ROCEPHIN) 1 g in 0.9% sodium chloride (MBP/ADV) 50 mL  1 g IntraVENous Q24H    metoprolol succinate (TOPROL-XL) tablet 200 mg  200 mg Oral BID    hydrALAZINE (APRESOLINE) tablet 50 mg  50 mg Oral TID    clonazePAM (KlonoPIN) tablet 0.25 mg  0.25 mg Oral BID PRN    aspirin chewable tablet 81 mg  81 mg Oral DAILY    fluticasone propionate (FLONASE) 50 mcg/actuation nasal spray 2 Spray  2 Spray Both Nostrils DAILY    pantoprazole (PROTONIX) tablet 40 mg  40 mg Oral ACB    0.9% sodium chloride infusion  75 mL/hr IntraVENous CONTINUOUS    sodium chloride (NS) flush 5-40 mL  5-40 mL IntraVENous Q8H    sodium chloride (NS) flush 5-40 mL  5-40 mL IntraVENous PRN    acetaminophen (TYLENOL) tablet 650 mg  650 mg Oral Q4H PRN    ondansetron (ZOFRAN ODT) tablet 4 mg  4 mg Oral Q4H PRN    senna-docusate (PERICOLACE) 8.6-50 mg per tablet 1 Tab  1 Tab Oral BID PRN    influenza vaccine 2019-20 (6 mos+)(PF) (FLUARIX/FLULAVAL/FLUZONE QUAD) injection 0.5 mL  0.5 mL IntraMUSCular PRIOR TO DISCHARGE       Other Studies (last 24 hours):  No results found.    Assessment and Plan:     Hospital Problems as of 6/22/2020 Date Reviewed: 9/20/2019          Codes Class Noted - Resolved POA    * (Principal) SHERIE (acute kidney injury) ([facility] ICD-10-CM: N17.9  ICD-9-CM: 584.9  6/18/2020 - Present Yes        Hyperkalemia ICD-10-CM: E87.5  ICD-9-CM: 276.7  6/18/2020 - Present Yes        Hyponatremia ICD-10-CM: E87.1  ICD-9-CM: 276.1  5/21/2019 - Present Yes        Type 2 diabetes with nephropathy ([facility] ICD-10-CM: E11.21  ICD-9-CM: 250.40, 583.81  5/10/2018 - Present Yes        UTI (urinary tract infection) ICD-10-CM: N39.0  ICD-9-CM: 599.0  4/10/2018 - Present Unknown        Essential hypertension ICD-10-CM: I10  ICD-9-CM: 401.9  Unknown - Present Yes        Anemia ICD-10-CM: D64.9  ICD-9-CM: 285. 9  Unknown - Present Yes              PLAN:    · Acute on chronic kidney disease stage IIIimproved and appears to be at baseline creatinine down from 3.24 on admission-at baseline is around 1.5-1.6  · Concern for UTI continue IV antibiotics-   · Hypomagnesemia repleted  · Acute on chronic normocytic anemia stable  · Hypokalemia  Replete  ·  metabolic encephalopathy suspected to be secondary to UTI and SHERIE-resolved  · Depression and early dementia-continue supportive care  · Diabetes monitor blood sugars and cover with insulin    DC planning/Dispo: Plan is for rehab placement  DVT ppx: SCDs    Signed:  Jyoti Anaya MD

## 2020-06-22 NOTE — PROGRESS NOTES
END OF SHIFT NOTE:    INTAKE/OUTPUT  06/21 0701 - 06/22 0700  In: 1200 [I.V.:1200]  Out: -   Voiding: YES  Catheter: NO  Drain:              Flatus: Patient does have flatus present. Stool:  1 occurrences. Characteristics:  Stool Assessment  Stool Color: Brown  Stool Appearance: Loose, Soft  Stool Amount: Medium  Stool Source/Status: Rectum    Emesis: 0 occurrences. Characteristics:        VITAL SIGNS  Patient Vitals for the past 12 hrs:   Temp Pulse Resp BP SpO2   06/22/20 0250 99.2 °F (37.3 °C) 76 19 153/73 93 %   06/21/20 2217 99.1 °F (37.3 °C) 83 20 165/77 96 %   06/21/20 1914 98.3 °F (36.8 °C) 82 21 183/50 95 %       Pain Assessment  Pain Intensity 1: 0 (06/21/20 1330)  Pain Location 1: Hand  Pain Intervention(s) 1: Medication (see MAR)  Patient Stated Pain Goal: 0    Ambulating  No    Shift report given to oncoming nurse at the bedside.     Louis Juarez RN

## 2020-06-22 NOTE — PROGRESS NOTES
Problem: Self Care Deficits Care Plan (Adult)  Goal: *Acute Goals and Plan of Care (Insert Text)  Description:   1. Patient will complete lower body bathing and dressing with moderate assistance and adaptive equipment as needed. 2. Patient will complete toileting with moderate assistance and adaptive equipment as needed. 3. Patient will tolerate 30 minutes of OT treatment with up to 3 rest breaks to increase activity tolerance for ADLs. 4. Patient will complete functional transfers with stand by assistance and adaptive equipment as needed. 5. Patient will complete functional mobility for ADLs with contact guard assistance and adaptive equipment as needed. 6. Patient will demonstrate improved sitting balance for ADLs with stand by assistance and adaptive equipment as needed. Timeframe: 7 visits      Outcome: Progressing Towards Goal     OCCUPATIONAL THERAPY: Initial Assessment, Daily Note, and AM 6/22/2020  OBSERVATION: OT Visit Days: 1  Payor: [employer] / Plan: [address] [place] [address] HMO / Product Type: [employer] /      NAME/AGE/GENDER: Yelena Hansen is a 81 y.o. female   PRIMARY DIAGNOSIS:  SHERIE (acute kidney injury) ([facility] [N17.9]  SHERIE (acute kidney injury) ([facility] [N17.9]  UTI (urinary tract infection) [N39.0] SHERIE (acute kidney injury) ([facility] SHERIE (acute kidney injury) ([facility]        ICD-10: Treatment Diagnosis:    Generalized Muscle Weakness (M62.81)  Other lack of cordination (R27.8)  History of falling (Z91.81)   Precautions/Allergies:    Fall precautions    Pcn [penicillins] and Sulfa (sulfonamide antibiotics)      ASSESSMENT:     Ms. Timur Hernandez is a 81 y.o. female admitted with SHERIE, UTI, dehydration. Hx dementia. Pt with some confusion this date, likely poor historian. Reports ambulating with rollator, endorses several falls, reports independence to modified independence with ADLs. At baseline pt lives son who works during the day.  Baseline per chart: pt with decreased ambulation, requires assistance for all ADLs. Upon arrival pt alert and agreeable to OT evaluation and treatment, O2 sats stable on room air though pt appears SOB. BUE assessment revealed AROM and strength decreased in BUEs. Tremors noted in BUEs impacting coordination. Pt completed bed mobility with Min-ModA/additional time, demonstrates fair sitting balance. ModA to scoot to edge of bed. Pt practiced grooming in sitting with SBA. Pt practiced sit > stand from bed with ModA, unable to stand fully upright, strong posterior lean. Reports she is more stable in her shoes. Donned shoes with MaxA/additional time. Sit > stand with ModA, steps to chair with ModA/cueing, poor RW management. Sat with [address] for controlled descent. Sit > stand for brief change with Min-ModA/additional time, total assist for clothing management, bowel/cristiana hygiene. Pt left seated in chair with call bell within reach. Pt presents with deficits in cognition, strength, activity tolerance, balance, ambulation and transfers. Edouard Mae is currently functioning below baseline and would benefit from continued OT to increase safety and independence with ADLs. Will follow.       This section established at most recent assessment   PROBLEM LIST (Impairments causing functional limitations):  Decreased Strength  Decreased ADL/Functional Activities  Decreased Transfer Abilities  Decreased Ambulation Ability/Technique  Decreased Balance  Decreased Activity Tolerance  Increased Fatigue  Increased Shortness of Breath  Decreased Flexibility/Joint Mobility  Decreased Knowledge of Precautions  Decreased Skin Integrity/Hygeine  Decreased [place] with Home Exercise Program  Decreased Cognition   INTERVENTIONS PLANNED: (Benefits and precautions of occupational therapy have been discussed with the patient.)  Activities of daily living training  Adaptive equipment training  Balance training  Clothing management  Cognitive training  Randi&robbie training  Hygiene training  Neuromuscular re-eduation  Re-evaluation  Therapeutic activity  Therapeutic exercise  Wheelchair management     TREATMENT PLAN: Frequency/Duration: Follow patient 3x/week to address above goals. Rehabilitation Potential For Stated Goals: Good     REHAB RECOMMENDATIONS (at time of discharge pending progress):    Placement: It is my opinion, based on this patient's performance to date, that Ms. Tiffanie Mae may benefit from intensive therapy at a [address] [place] [address] after discharge due to the functional deficits listed above that are likely to improve with skilled rehabilitation and concerns that he/she may be unsafe to be unsupervised at home due to impaired strength and balance impacting ADLs, increasing risk of falls. Equipment:   TBD               OCCUPATIONAL PROFILE AND HISTORY:   History of Present Injury/Illness (Reason for Referral):  See H&P. Past Medical History/Comorbidities:   Ms. 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), Renal colic, Type II or unspecified type diabetes mellitus without mention of complication, not stated as uncontrolled, and Unspecified essential hypertension. Ms. Tiffanie Mae  has a past surgical history that includes hx cholecystectomy; hx heent; hx appendectomy; hx urological; hx gyn; and hx tonsillectomy.   Social History/Living Environment:   Home Environment: Trailer/mobile home  Wheelchair Ramp: Yes  One/Two Story Residence: One story  Living Alone: No  Support Systems: Child(forest)  Patient Expects to be Discharged to[de-identified] Unknown  Current DME Used/Available at Home: Safety frame toliet, Grab bars, Walker, rollator  Prior Level of Function/Work/Activity:  Hx dementia. Pt with some confusion this date, likely poor historian. Reports ambulating with rollator, endorses several falls, reports independence to modified independence with ADLs. At baseline pt lives son who works during the day. Baseline per chart: pt with decreased ambulation, requires assistance for all ADLs. Personal Factors:          Sex:  female        Age:  81 y.o. Other factors that influence how disability is experienced by the patient:  Multiple co-morbidities, falls   Number of Personal Factors/Comorbidities that affect the Plan of Care: Expanded review of therapy/medical records (1-2):  MODERATE COMPLEXITY   ASSESSMENT OF OCCUPATIONAL PERFORMANCE[de-identified]   Activities of Daily Living:   Basic ADLs (From Assessment) Complex ADLs (From Assessment)   Feeding: Setup  Oral Facial Hygiene/Grooming: Minimum assistance  Bathing: Moderate assistance  Upper Body Dressing: Minimum assistance  Lower Body Dressing: Total assistance  Toileting: Total assistance Instrumental ADL  Meal Preparation: Total assistance  Homemaking: Total assistance  Medication Management: Total assistance  Financial Management: Total assistance   Grooming/Bathing/Dressing Activities of Daily Living   Grooming  Position Performed: Seated edge of bed  Brushing/Combing Hair: Stand-by assistance Cognitive Retraining  Safety/Judgement: Fall prevention           Toileting  Bladder Hygiene: Total assistance (dependent)  Bowel Hygiene: Total assistance (dependent)  Clothing Management: Total assistance (dependent)  Adaptive Equipment: Walker         Lower Body Dressing Assistance  Slip on Shoes with Back: Maximum assistance; Total assistance (dependent) Bed/Mat Mobility  Supine to Sit: Moderate assistance  Sit to Stand: Moderate assistance;Minimum assistance  Stand to Sit: Moderate assistance  Bed to Chair: Moderate assistance  Scooting:  Moderate assistance     Most Recent Physical Functioning:   Gross Assessment:  AROM: Generally decreased, functional(BUEs)  Strength: Grossly decreased, non-functional(BUEs)  Coordination: Grossly decreased, non-functional(Tremors in BUEs)               Posture:  Posture (WDL): Exceptions to WDL  Posture Assessment: Forward head, Trunk flexion, Rounded shoulders  Balance:  Sitting: Impaired  Sitting - Static: Fair (occasional)  Sitting - Dynamic: Fair (occasional)  Standing: Impaired  Standing - Static: Poor;Constant support  Standing - Dynamic : Poor;Constant support Bed Mobility:  Supine to Sit: Moderate assistance  Scooting: Moderate assistance  Wheelchair Mobility:     Transfers:  Sit to Stand: Moderate assistance;Minimum assistance  Stand to Sit: Moderate assistance  Bed to Chair: Moderate assistance            Patient Vitals for the past 6 hrs:   BP SpO2 Pulse   20 1002 -- 96 % --   20 1138 152/76 92 % 80       Mental Status  Neurologic State: Alert  Orientation Level: Oriented to person  Cognition: Decreased attention/concentration, Decreased command following  Safety/Judgement: Fall prevention                          Physical Skills Involved:  Range of Motion  Balance  Strength  Activity Tolerance  Fine Motor Control  Gross Motor Control  Skin Integrity Cognitive Skills Affected (resulting in the inability to perform in a timely and safe manner):  Executive Function  Immediate Memory  Short Term Recall  Sustained Attention  Divided Attention  Comprehension Psychosocial Skills Affected:  Habits/Routines  Environmental Adaptation  Social Interaction  Emotional Regulation  Self-Awareness  Awareness of Others   Number of elements that affect the Plan of Care: 5+:  HIGH COMPLEXITY   CLINICAL DECISION MAKIN [facility] [address] [address] AM-PAC 6 Clicks   Daily Activity Inpatient Short Form  How much help from another person does the patient currently need. .. Total A Lot A Little None   1.   Putting on and taking off regular lower body clothing? [x] 1   [] 2   [] 3   [] 4   2. Bathing (including washing, rinsing, drying)? [] 1   [x] 2   [] 3   [] 4   3. Toileting, which includes using toilet, bedpan or urinal?   [x] 1   [] 2   [] 3   [] 4   4. Putting on and taking off regular upper body clothing? [] 1   [] 2   [x] 3   [] 4   5. Taking care of personal grooming such as brushing teeth? [] 1   [] 2   [x] 3   [] 4   6. Eating meals? [] 1   [] 2   [x] 3   [] 4   © 2007, Trustees of [address] [place] [address] [address] [address], under license to [employer]. All rights reserved      Score:  Initial: 13 6/22/2020 Most Recent: X (Date: -- )    Interpretation of Tool:  Represents activities that are increasingly more difficult (i.e. Bed mobility, Transfers, Gait). Medical Necessity:     Patient demonstrates   good   rehab potential due to higher previous functional level. Reason for Services/Other Comments:  Patient continues to require skilled intervention due to   Inability to complete ADLs at prior level of independence   . Use of outcome tool(s) and clinical judgement create a POC that gives a: MODERATE COMPLEXITY         TREATMENT:   (In addition to Assessment/Re-Assessment sessions the following treatments were rendered)     Pre-treatment Symptoms/Complaints:    Pain: Initial:   Pain Intensity 1: 0  Post Session:  same     Self Care: (23 minutes): Procedure(s) (per grid) utilized to improve and/or restore self-care/home management as related to dressing, toileting, and grooming. Required maximal visual, verbal, manual, and tactile cueing to facilitate activities of daily living skills and compensatory activities. ModA to scoot to edge of bed. Pt practiced grooming in sitting with SBA. Pt practiced sit > stand from bed with ModA, unable to stand fully upright, strong posterior lean. Reports she is more stable in her shoes. Donned shoes with MaxA/additional time. Sit > stand with ModA, steps to chair with ModA/cueing, poor RW management.  Sat with [address] [facility] for controlled descent. Sit > stand for brief change with Min-ModA/additional time, total assist for clothing management, bowel/cristiana hygiene. Braces/Orthotics/Lines/Etc:   IV  O2 Device: Room air  Treatment/Session Assessment:    Response to Treatment:  Tolerated well   Interdisciplinary Collaboration:   Occupational Therapist  Registered Nurse  After treatment position/precautions:   Up in chair  Bed alarm/tab alert on  Bed/Chair-wheels locked  Bed in low position  Call light within reach  RN notified   Compliance with Program/Exercises: Compliant all of the time, Will assess as treatment progresses. Recommendations/Intent for next treatment session: \"Next visit will focus on advancements to more challenging activities and reduction in assistance provided\".   Total Treatment Duration:  OT Patient Time In/Time Out  Time In: 1002  Time Out: [address], OTR/L

## 2020-06-22 NOTE — PROGRESS NOTES
CM continues to follow for discharge planning and/or CM needs. Per son, he would like SNF referrals made to [facility], [facility], and [facility] for pt. Referrals made this day via [facility]. No additional CM needs at this time. Will continue to follow.

## 2020-06-22 NOTE — PROGRESS NOTES
END OF SHIFT NOTE:    INTAKE/OUTPUT  06/21 0701 - 06/22 0700  In: 1200 [I.V.:1200]  Out: -   Voiding: YES  Catheter: NO  Drain:              Flatus: Patient does have flatus present. Stool:  1 occurrences. Characteristics:  Stool Assessment  Stool Color: Brown  Stool Appearance: Loose, Soft  Stool Amount: Medium  Stool Source/Status: Rectum    Emesis: 0 occurrences. Characteristics:        VITAL SIGNS  Patient Vitals for the past 12 hrs:   Temp Pulse Resp BP SpO2   06/22/20 1901 98 °F (36.7 °C) 84 18 158/72 92 %   06/22/20 1434 98.4 °F (36.9 °C) (!) 104 18 166/84 90 %   06/22/20 1138 98 °F (36.7 °C) 80 17 152/76 92 %   06/22/20 1002     96 %   06/22/20 0730 98.6 °F (37 °C) 72 18 170/72 96 %       Pain Assessment  Pain Intensity 1: 0 (06/22/20 1002)  Pain Location 1: Hand  Pain Intervention(s) 1: Medication (see MAR)  Patient Stated Pain Goal: 0    Ambulating  Yes    Shift report given to oncoming nurse at the bedside.     Carmen Baez RN

## 2020-06-22 NOTE — PHYSICIAN ADVISORY
Letter of Status Determination: Current Status OBSERVATION is Appropriate Pt Name:  Bob Corona MR#  [id number] [facility]#   [id number] Room and Hospital  236/01  @ [address] [place] [address] Hospitalization date  6/18/2020  6:12 PM  
Current Attending Physician  Ivette Clark MD  
Principal diagnosis  SHERIE (acute kidney injury) [facility]) Clinicals  The patient is an 69-year-old  lady with HTN, DM type II, CKD stage III with baseline creatinine of 1.5-1.6, chronic normocytic anemia with recent baseline hemoglobin of 8.1, depression, history of kidney stones, early dementia, is presently managed for SHERIE on CKD, acute on chronic anemia, UTI, hypomagnesemia and hyperkalemia.  She is presently being hydrated with IV fluids and was initiated on IV ceftriaxone.  She denies any significant complaints, actually denies any significant dysuria, shortness of breath or cough.  Her main complaint is generalized weakness.  As per the son, she was also confused, but now her mental status is back to baseline and she is alert.  Hypokalemia has resolved, as the potassium came down from 6.6 on admission to 3.5. Regenia Range renal function is also improved, with her creatinine down from 3.24 on admission to 1. 80.  Her hemoglobin improved from 6.7 to 8.3 after administration of 1 PRBC, and it is actually up to 9.1 today.  She was seen by physical therapy and was deemed appropriate for discharge to SNF for rehabilitation.  The patient is actually agreeable to go to rehab. [facility] criteria Does  NOT apply STATUS DETERMINATION  On the basis of clinical data, available documentaion, we believe that the current status of this patient as OBSERVATION is Appropriate The final decision of the patient's hospitalization status depends on the attending physician's judgment Additional comments Insurance  Payor: Leonela Moctezuma / Plan: [address] / Product Type: Managed Care Medicare / Insurance Information [address] [facility] Stella. [facility] Phone: [phone] Subscriber: Tasha Martinez Subscriber#: [id number] Group#: [id number] Precert#:   
  
 
  
 
 
 
 
Sheldon Swanson MD 
Cell: [phone] Physician Advisor

## 2020-06-23 PROBLEM — E11.21 TYPE 2 DIABETES WITH NEPHROPATHY (HCC): Chronic | Status: ACTIVE | Noted: 2018-05-10

## 2020-06-23 PROBLEM — E87.5 HYPERKALEMIA: Status: RESOLVED | Noted: 2020-06-18 | Resolved: 2020-06-23

## 2020-06-23 PROBLEM — N17.9 AKI (ACUTE KIDNEY INJURY) (HCC): Status: RESOLVED | Noted: 2020-06-18 | Resolved: 2020-06-23

## 2020-06-23 PROBLEM — E87.1 HYPONATREMIA: Status: RESOLVED | Noted: 2019-05-21 | Resolved: 2020-06-23

## 2020-06-23 PROBLEM — E11.42 TYPE 2 DIABETES MELLITUS WITH DIABETIC POLYNEUROPATHY, WITHOUT LONG-TERM CURRENT USE OF INSULIN (HCC): Chronic | Status: ACTIVE | Noted: 2017-09-15

## 2020-06-23 PROBLEM — N18.30 CKD (CHRONIC KIDNEY DISEASE) STAGE 3, GFR 30-59 ML/MIN (HCC): Chronic | Status: ACTIVE | Noted: 2020-06-04

## 2020-06-23 PROBLEM — N39.0 UTI (URINARY TRACT INFECTION): Status: RESOLVED | Noted: 2018-04-10 | Resolved: 2020-06-23

## 2020-06-23 PROBLEM — F03.90 DEMENTIA WITHOUT BEHAVIORAL DISTURBANCE (HCC): Chronic | Status: ACTIVE | Noted: 2019-07-23

## 2020-06-23 LAB
ANION GAP SERPL CALC-SCNC: 13 MMOL/L (ref 7–16)
BASOPHILS # BLD: 0 K/UL (ref 0–0.2)
BASOPHILS NFR BLD: 0 % (ref 0–2)
BUN SERPL-MCNC: 10 MG/DL (ref 8–23)
CALCIUM SERPL-MCNC: 6.3 MG/DL (ref 8.3–10.4)
CHLORIDE SERPL-SCNC: 120 MMOL/L (ref 98–107)
CO2 SERPL-SCNC: 15 MMOL/L (ref 21–32)
CREAT SERPL-MCNC: 1.49 MG/DL (ref 0.6–1)
DIFFERENTIAL METHOD BLD: NORMAL
EMERGENT DISEASE PANEL, EDPR: NOT DETECTED
EOSINOPHIL # BLD: 0.4 K/UL (ref 0–0.8)
EOSINOPHIL NFR BLD: 3 % (ref 0.5–7.8)
ERYTHROCYTE [DISTWIDTH] IN BLOOD BY AUTOMATED COUNT: 14.3 % (ref 11.9–14.6)
GLUCOSE SERPL-MCNC: 110 MG/DL (ref 65–100)
HCT VFR BLD AUTO: 28 % (ref 35.8–46.3)
HGB BLD-MCNC: 8.8 G/DL (ref 11.7–15.4)
IMM GRANULOCYTES # BLD AUTO: 0.1 K/UL (ref 0–0.5)
IMM GRANULOCYTES NFR BLD AUTO: 1 % (ref 0–5)
LYMPHOCYTES # BLD: 2.8 K/UL (ref 0.5–4.6)
LYMPHOCYTES NFR BLD: 27 % (ref 13–44)
MCH RBC QN AUTO: 29.1 PG (ref 26.1–32.9)
MCHC RBC AUTO-ENTMCNC: 31.4 G/DL (ref 31.4–35)
MCV RBC AUTO: 92.7 FL (ref 79.6–97.8)
MM INDURATION POC: 0 MM (ref 0–5)
MONOCYTES # BLD: 0.8 K/UL (ref 0.1–1.3)
MONOCYTES NFR BLD: 8 % (ref 4–12)
NEUTS SEG # BLD: 6.6 K/UL (ref 1.7–8.2)
NEUTS SEG NFR BLD: 62 % (ref 43–78)
NRBC # BLD: 0 K/UL (ref 0–0.2)
PLATELET # BLD AUTO: 354 K/UL (ref 150–450)
PMV BLD AUTO: 9.1 FL (ref 9.4–12.3)
POTASSIUM SERPL-SCNC: 3 MMOL/L (ref 3.5–5.1)
PPD POC: NEGATIVE NEGATIVE
RBC # BLD AUTO: 3.02 M/UL (ref 4.05–5.2)
SODIUM SERPL-SCNC: 148 MMOL/L (ref 136–145)
WBC # BLD AUTO: 10.7 K/UL (ref 4.3–11.1)

## 2020-06-23 PROCEDURE — 74011250636 HC RX REV CODE- 250/636: Performed by: INTERNAL MEDICINE

## 2020-06-23 PROCEDURE — 80048 BASIC METABOLIC PNL TOTAL CA: CPT

## 2020-06-23 PROCEDURE — 74011250637 HC RX REV CODE- 250/637: Performed by: INTERNAL MEDICINE

## 2020-06-23 PROCEDURE — 85027 COMPLETE CBC AUTOMATED: CPT

## 2020-06-23 PROCEDURE — 97530 THERAPEUTIC ACTIVITIES: CPT

## 2020-06-23 PROCEDURE — 74011000258 HC RX REV CODE- 258: Performed by: INTERNAL MEDICINE

## 2020-06-23 PROCEDURE — 36415 COLL VENOUS BLD VENIPUNCTURE: CPT

## 2020-06-23 PROCEDURE — 99218 HC RM OBSERVATION: CPT

## 2020-06-23 PROCEDURE — 96376 TX/PRO/DX INJ SAME DRUG ADON: CPT

## 2020-06-23 RX ORDER — LISINOPRIL 20 MG/1
40 TABLET ORAL DAILY
Status: DISCONTINUED | OUTPATIENT
Start: 2020-06-23 | End: 2020-06-24 | Stop reason: HOSPADM

## 2020-06-23 RX ORDER — HYDRALAZINE HYDROCHLORIDE 20 MG/ML
20 INJECTION INTRAMUSCULAR; INTRAVENOUS
Status: DISCONTINUED | OUTPATIENT
Start: 2020-06-23 | End: 2020-06-24 | Stop reason: HOSPADM

## 2020-06-23 RX ORDER — LANOLIN ALCOHOL/MO/W.PET/CERES
1 CREAM (GRAM) TOPICAL
Status: DISCONTINUED | OUTPATIENT
Start: 2020-06-23 | End: 2020-06-24 | Stop reason: HOSPADM

## 2020-06-23 RX ORDER — LISINOPRIL 20 MG/1
20 TABLET ORAL DAILY
Status: DISCONTINUED | OUTPATIENT
Start: 2020-06-23 | End: 2020-06-23

## 2020-06-23 RX ADMIN — CEFTRIAXONE SODIUM 1 G: 1 INJECTION, POWDER, FOR SOLUTION INTRAMUSCULAR; INTRAVENOUS at 16:55

## 2020-06-23 RX ADMIN — Medication 10 ML: at 21:59

## 2020-06-23 RX ADMIN — POTASSIUM CHLORIDE 20 MEQ: 14.9 INJECTION, SOLUTION INTRAVENOUS at 00:30

## 2020-06-23 RX ADMIN — HYDRALAZINE HYDROCHLORIDE 50 MG: 50 TABLET, FILM COATED ORAL at 16:55

## 2020-06-23 RX ADMIN — POTASSIUM BICARBONATE 40 MEQ: 782 TABLET, EFFERVESCENT ORAL at 10:51

## 2020-06-23 RX ADMIN — Medication 10 ML: at 06:13

## 2020-06-23 RX ADMIN — LISINOPRIL 40 MG: 20 TABLET ORAL at 10:53

## 2020-06-23 RX ADMIN — HYDRALAZINE HYDROCHLORIDE 50 MG: 50 TABLET, FILM COATED ORAL at 21:32

## 2020-06-23 RX ADMIN — METOPROLOL SUCCINATE 200 MG: 100 TABLET, EXTENDED RELEASE ORAL at 21:32

## 2020-06-23 RX ADMIN — FERROUS SULFATE TAB 325 MG (65 MG ELEMENTAL FE) 325 MG: 325 (65 FE) TAB at 10:51

## 2020-06-23 RX ADMIN — PANTOPRAZOLE SODIUM 40 MG: 40 TABLET, DELAYED RELEASE ORAL at 06:13

## 2020-06-23 RX ADMIN — FLUTICASONE PROPIONATE 2 SPRAY: 50 SPRAY, METERED NASAL at 09:48

## 2020-06-23 RX ADMIN — HYDRALAZINE HYDROCHLORIDE 50 MG: 50 TABLET, FILM COATED ORAL at 09:47

## 2020-06-23 RX ADMIN — ASPIRIN 81 MG 81 MG: 81 TABLET ORAL at 09:47

## 2020-06-23 RX ADMIN — Medication 10 ML: at 13:05

## 2020-06-23 RX ADMIN — METOPROLOL SUCCINATE 200 MG: 100 TABLET, EXTENDED RELEASE ORAL at 09:46

## 2020-06-23 NOTE — ROUTINE PROCESS
END OF SHIFT NOTE: 
 
INTAKE/OUTPUT 
06/22 0701 - 06/23 0700 In: 2525 [P.O.:230; I.V.:2295] Out: 250 [Urine:250] Voiding: YES Catheter: NO 
Drain:   
 
 
 
 
 
Flatus: Patient does have flatus present. Stool:  1 occurrences. Characteristics: 
Stool Assessment Stool Color: Keily Dollar Stool Appearance: Soft Stool Amount: Small Stool Source/Status: Rectum Emesis: 0 occurrences. Characteristics: VITAL SIGNS Patient Vitals for the past 12 hrs: 
 Temp Pulse Resp BP SpO2  
06/23/20 1521 98.5 °F (36.9 °C) 85 16 151/75 98 %  
06/23/20 1100 98 °F (36.7 °C) 96 16 166/84 98 %  
06/23/20 0700 98 °F (36.7 °C) 74 16 169/74 92 % Pain Assessment Pain Intensity 1: 0 (06/23/20 1310) Pain Location 1: Hand Pain Intervention(s) 1: Medication (see MAR) Patient Stated Pain Goal: 0 Ambulating Yes Shift report given to oncoming nurse at the bedside.  
 
Ana Mojica RN

## 2020-06-23 NOTE — PROGRESS NOTES
Problem: Mobility Impaired (Adult and Pediatric)  Goal: *Acute Goals and Plan of Care (Insert Text)  Description: 1.  Ms. Deana Moody will perform supine to sit and sit to supine independently in 7 days. 2.  Ms. Deana Moody will perform sit to stand and bed to chair with RW independently in 7 days. 3.  Ms. Deana Moody will perform gait with rolling walker 100 ft independently in 7 days. Outcome: Progressing Towards Goal       PHYSICAL THERAPY: Daily Note and AM 6/23/2020  OBSERVATION: PT Visit Days : 2  Payor: Arina Samson / Plan: [address] [place] [address] HMO / Product Type: Managed Care Medicare /       NAME/AGE/GENDER: Mariano Sanz is a 81 y.o. female   PRIMARY DIAGNOSIS: SHERIE (acute kidney injury) ([facility] [address] [N17.9]  SHERIE (acute kidney injury) ([facility] [address] [N17.9]  UTI (urinary tract infection) [N39.0] SHERIE (acute kidney injury) ([facility] [address] SHERIE (acute kidney injury) ([facility] [address]       ICD-10: Treatment Diagnosis:    · Generalized Muscle Weakness (M62.81)  · Difficulty in walking, Not elsewhere classified (R26.2)   Precaution/Allergies:  Pcn [penicillins] and Sulfa (sulfonamide antibiotics)      ASSESSMENT:     Ms. Deana Moody has difficult history/ potentially unsafe home situtation due to dementia and family unable to provide 24/7 care (see previous PT note). The patient is supine in bed upon contact and very talkative/shaky throughout treatment. She performed bed mobility with CGA and verbal cues. She donned her shoes in sitting with SBA and then performed sit to stand with CGA-min A. She ambulated 10' in the room with the RW and min A to the [facility]. She sat with CGA and demonstrated good to fair sitting balance with leaning, weight shifting, and reaching while performing self-care and voiding. The patient then stood with min A and ambulated an additional 5' to the recliner chair. She sat and was positioned for comfort with needs in reach.   The patient is VERY shaky throughout and required additional time and cues for all mobility. Overall the patient is making slow progress toward therapy goals as indicated by decreased assistance levels but will require STR to improve mobility and strength and may need to transition to a place with 24/7 supervision. Will continue skilled PT treatment as patient is still below functional baseline. This section established at most recent assessment   PROBLEM LIST (Impairments causing functional limitations):  1. Decreased Strength  2. Decreased Transfer Abilities  3. Decreased Ambulation Ability/Technique  4. Decreased Activity Tolerance   INTERVENTIONS PLANNED: (Benefits and precautions of physical therapy have been discussed with the patient.)  1. Bed Mobility  2. Gait Training  3. Therapeutic Activites  4. Therapeutic Exercise/Strengthening  5. Transfer Training     TREATMENT PLAN: Frequency/Duration: 3 times a week for duration of hospital stay  Rehabilitation Potential For Stated Goals: [facility] (at time of discharge pending progress):    Placement: It is my opinion, based on this patient's performance to date, that Ms. Matthew Garcia may benefit from intensive therapy at a [address] [place] [address] after discharge due to the functional deficits listed above that are likely to improve with skilled rehabilitation and concerns that he/she may be unsafe to be unsupervised at home due to confusion, and recent falls. Currently requiring min assist for all mobility and gait. .  Equipment:    None at this time              HISTORY:   History of Present Injury/Illness (Reason for Referral):  Mary Yusuf is a 81 y.o. female with a history of early dementia, hypertension, type 2 diabetes mellitus, depression presenting with dehydration. Patient lives at home with her son, the son reports that she has not been eating for the past few days. He states that she is unable to eat much at the time and does not eat at all on her own volition.   She reports feeling weak and typically does not ambulate at all at home. When he finds her home she is been in the same place on movement and has not eaten at all. He notes some confusion and disorientation and she often does not know where she is. He called EMS today and she was found to have a blood glucose of 50 at home even after eating a meal.  Her blood pressure was 100/50. Patient was brought to the ED. Of note patient was recently treated for UTI with Macrobid. Patient herself states that she feels fine and states she does not know why she is in the hospital and wants to go home. Past Medical History/Comorbidities:   Ms. 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 Ms. Marika Rios  has a past surgical history that includes hx cholecystectomy; hx heent; hx appendectomy; hx urological; hx gyn; and hx tonsillectomy. Social History/Living Environment:   Home Environment: Trailer/mobile home  Wheelchair Ramp: Yes  One/Two Story Residence: One story  Living Alone: No  Support Systems: Child(forest)  Patient Expects to be Discharged to[de-identified] Unknown  Current DME Used/Available at Home: Safety frame toliet, [employer], Walker, rollator  Prior Level of Function/Work/Activity:  Uses a rollator. Home alone during the day. Hx of falls.    Age     Number of Personal Factors/Comorbidities that affect the Plan of Care: 1-2: MODERATE COMPLEXITY   EXAMINATION:   Most Recent Physical Functioning:   Gross Assessment: Posture:     Balance:  Sitting: Impaired  Sitting - Static: Fair (occasional); Good (unsupported)  Sitting - Dynamic: Fair (occasional)  Standing: Impaired  Standing - Static: Poor  Standing - Dynamic : Poor Bed Mobility:  Supine to Sit: Minimum assistance;Contact guard assistance  Scooting: Contact guard assistance  Wheelchair Mobility:     Transfers:  Sit to Stand: Contact guard assistance;Minimum assistance  Stand to Sit: Contact guard assistance  Bed to Chair: Contact guard assistance  Gait:     Base of Support: Widened  Step Length: Right shortened;Left shortened  Distance (ft): 15 Feet (ft)  Assistive Device: Walker, rolling;Gait belt  Ambulation - Level of Assistance: Minimal assistance;Contact guard assistance      Body Structures Involved:  1. Muscles Body Functions Affected:  1. Movement Related Activities and Participation Affected:  1. Mobility   Number of elements that affect the Plan of Care: 3: MODERATE COMPLEXITY   CLINICAL PRESENTATION:   Presentation: Evolving clinical presentation with changing clinical characteristics: MODERATE COMPLEXITY   CLINICAL DECISION MAKIN [facility] Inpatient Short Form  How much difficulty does the patient currently have. .. Unable A Lot A Little None   1. Turning over in bed (including adjusting bedclothes, sheets and blankets)? [] 1   [] 2   [x] 3   [] 4   2. Sitting down on and standing up from a chair with arms ( e.g., wheelchair, bedside commode, etc.)   [] 1   [] 2   [x] 3   [] 4   3. Moving from lying on back to sitting on the side of the bed? [] 1   [] 2   [x] 3   [] 4   How much help from another person does the patient currently need. .. Total A Lot A Little None   4. Moving to and from a bed to a chair (including a wheelchair)? [] 1   [] 2   [x] 3   [] 4   5. Need to walk in hospital room? [] 1   [] 2   [x] 3   [] 4   6. Climbing 3-5 steps with a railing?    [] 1   [] 2   [x] 3   [] 4   © 2007, Trustees of [facility], under license to [employer]. All rights reserved      Score:  Initial: 18 Most Recent: X (Date: -- )    Interpretation of Tool:  Represents activities that are increasingly more difficult (i.e. Bed mobility, Transfers, Gait). Medical Necessity:     · Patient is expected to demonstrate progress in   · functional technique  ·  to   · decrease assistance required with mobility and gait. · .  Reason for Services/Other Comments:  · Patient   · continues to require present interventions due to patient's inability to function at baseline. · .   Use of outcome tool(s) and clinical judgement create a POC that gives a: Questionable prediction of patient's progress: MODERATE COMPLEXITY            TREATMENT:   (In addition to Assessment/Re-Assessment sessions the following treatments were rendered)   Pre-treatment Symptoms/Complaints:  none  Pain: Initial:   Pain Intensity 1: 0  Post Session:  none     Therapeutic Activity: (    38 min): Therapeutic activities including Bed transfers, toilet transfers, Chair transfers, and Ambulation on level ground to improve mobility. Required minimal   to promote static and dynamic balance in standing and promote motor control of bilateral, upper extremity(s), lower extremity(s). Braces/Orthotics/Lines/Etc:   · O2 Device: Room air  Treatment/Session Assessment:    · Response to Treatment:  good   · Interdisciplinary Collaboration:   o Physical Therapy Assistant  o Registered Nurse  · After treatment position/precautions:   o Up in chair  o Bed alarm/tab alert on  o Bed/Chair-wheels locked  o Bed in low position  o Call light within reach  o RN notified   · Compliance with Program/Exercises: Will assess as treatment progresses  · Recommendations/Intent for next treatment session: \"Next visit will focus on advancements to more challenging activities and reduction in assistance provided\".   Total Treatment Duration:  PT Patient Time In/Time Out  Time In: 1029  Time Out: [address], PTA

## 2020-06-23 NOTE — PROGRESS NOTES
Hospitalist Progress Note     Admit Date:  2020  6:12 PM   Name:  Ren Alvarado   Age:  81 y.o.  :  1935   MRN:  [id number]   PCP:  Katiana Painting MD  Treatment Team: Attending Provider: Myles Schmitz MD; Primary Nurse: Georgia Jansen, RN; Utilization Review: Laney Padilla RN; Care Manager: Sanjay Beck RN; Physical Therapy Assistant: Sloan Bird PTA    Subjective:       As per prior documentation:  \" The patient is an 81-year-old  lady with HTN, DM type II, CKD stage III with baseline creatinine of 1.5-1.6, chronic normocytic anemia with recent baseline hemoglobin of 8.1, depression, history of kidney stones, early dementia, is presently managed for SHERIE on CKD, acute on chronic anemia, UTI, hypomagnesemia and hyperkalemia.  She is presently being hydrated with IV fluids and was initiated on IV ceftriaxone.  She denies any significant complaints, actually denies any significant dysuria, shortness of breath or cough.  Her main complaint is generalized weakness.  As per the son, she was also confused, but now her mental status is back to baseline and she is alert.  Hypokalemia has resolved, as the potassium came down from 6.6 on admission to 3.5. Tej Scales renal function is also improved, with her creatinine down from 3.24 on admission to 1. 80.  Her hemoglobin improved from 6.7 to 8.3 after administration of 1 PRBC, and it is actually up to 9.1 today.  She was seen by physical therapy and was deemed appropriate for discharge to SNF for rehabilitation.  The patient is actually agreeable to go to rehab. \"     - awaiting SNF placement. No complaints. Pleasantly confused.   No pain, fevers, SOB    Objective:     Patient Vitals for the past 24 hrs:   Temp Pulse Resp BP SpO2   20 0700 98 °F (36.7 °C) 74 16 169/74 92 %   20 0330 98.1 °F (36.7 °C) 69 16 150/74 97 %   20 2326 97.6 °F (36.4 °C) 81 20 172/80 98 %   20 1901 98 °F (36.7 °C) 84 18 158/72 92 %   06/22/20 1434 98.4 °F (36.9 °C) (!) 104 18 166/84 90 %   06/22/20 1138 98 °F (36.7 °C) 80 17 152/76 92 %     Oxygen Therapy  O2 Sat (%): 92 % (06/23/20 0700)  Pulse via Oximetry: 75 beats per minute (06/18/20 2159)  O2 Device: Room air (06/22/20 1901)    Intake/Output Summary (Last 24 hours) at 6/23/2020 1002  Last data filed at 6/23/2020 2534  Gross per 24 hour   Intake 2525 ml   Output 250 ml   Net 2275 ml         General:    Elderly frail in no distress  CV:   RRR. No murmur, rub, or gallop. Lungs:   CTAB. No wheezing, rhonchi, or rales. Abdomen:   Soft, nontender, nondistended. Extremities: Warm and dry. No cyanosis or edema. Skin:     No rashes or jaundice. Data Review:  I have reviewed all labs, meds, telemetry events, and studies from the last 24 hours.     Recent Results (from the past 24 hour(s))   PLEASE READ & DOCUMENT PPD TEST IN 48 HRS    Collection Time: 06/22/20  6:02 PM   Result Value Ref Range    PPD Negative Negative    mm Induration 0 0 - 0 mm   METABOLIC PANEL, BASIC    Collection Time: 06/23/20  6:48 AM   Result Value Ref Range    Sodium 148 (H) 136 - 145 mmol/L    Potassium 3.0 (L) 3.5 - 5.1 mmol/L    Chloride 120 (H) 98 - 107 mmol/L    CO2 15 (L) 21 - 32 mmol/L    Anion gap 13 7 - 16 mmol/L    Glucose 110 (H) 65 - 100 mg/dL    BUN 10 8 - 23 MG/DL    Creatinine 1.49 (H) 0.6 - 1.0 MG/DL    GFR est AA 43 (L) >60 ml/min/1.73m2    GFR est non-AA 35 (L) >60 ml/min/1.73m2    Calcium 6.3 (L) 8.3 - 10.4 MG/DL   CBC W/O DIFF    Collection Time: 06/23/20  6:48 AM   Result Value Ref Range    WBC 10.7 4.3 - 11.1 K/uL    RBC 3.02 (L) 4.05 - 5.2 M/uL    HGB 8.8 (L) 11.7 - 15.4 g/dL    HCT 28.0 (L) 35.8 - 46.3 %    MCV 92.7 79.6 - 97.8 FL    MCH 29.1 26.1 - 32.9 PG    MCHC 31.4 31.4 - 35.0 g/dL    RDW 14.3 11.9 - 14.6 %    PLATELET 744 083 - 187 K/uL    MPV 9.1 (L) 9.4 - 12.3 FL    ABSOLUTE NRBC 0.00 0.0 - 0.2 K/uL   DIFFERENTIAL, AUTO    Collection Time: 06/23/20  6:48 AM Result Value Ref Range    NEUTROPHILS 62 43 - 78 %    LYMPHOCYTES 27 13 - 44 %    MONOCYTES 8 4.0 - 12.0 %    EOSINOPHILS 3 0.5 - 7.8 %    BASOPHILS 0 0.0 - 2.0 %    ABS. NEUTROPHILS 6.6 1.7 - 8.2 K/UL    ABS. LYMPHOCYTES 2.8 0.5 - 4.6 K/UL    ABS. MONOCYTES 0.8 0.1 - 1.3 K/UL    ABS. EOSINOPHILS 0.4 0.0 - 0.8 K/UL    ABS. BASOPHILS 0.0 0.0 - 0.2 K/UL    DF AUTOMATED      IMMATURE GRANULOCYTES 1 0.0 - 5.0 %    ABS. IMM. GRANS. 0.1 0.0 - 0.5 K/UL        All Micro Results     Procedure Component Value Units Date/Time    EMERGENT DISEASE PANEL [[id number]] Collected:  06/20/20 1812    Order Status:  Completed Specimen:  Not Specified Updated:  06/23/20 0644     Emergent disease panel Not detected        Comment: Performed at [address]  06/23/20, ADS         CULTURE, URINE [[id number]] Collected:  06/18/20 2217    Order Status:  Completed Specimen:  Urine from Clean catch Updated:  06/21/20 1010     Special Requests: NO SPECIAL REQUESTS        Culture result:       >100,000 COLONIES/mL MIXED SKIN BATSHEVA ISOLATED                  THREE OR MORE TYPES OF ORGANISMS ARE PRESENT. THIS IS INDICATIVE OF CONTAMINATION DUE TO IMPROPER COLLECTION TECHNIQUE. PLEASE REPEAT COLLECTION UNLESS PATIENT HAS STARTED ANTIBIOTIC TREATMENT.           CULTURE, BLOOD [[id number]]     Order Status:  Canceled Specimen:  Blood     CULTURE, BLOOD [[id number]]     Order Status:  Canceled Specimen:  Blood           Current Meds:  Current Facility-Administered Medications   Medication Dose Route Frequency    potassium bicarb-citric acid (EFFER-K) tablet 40 mEq  40 mEq Oral NOW    ferrous sulfate tablet 325 mg  1 Tab Oral DAILY WITH BREAKFAST    lisinopriL (PRINIVIL, ZESTRIL) tablet 40 mg  40 mg Oral DAILY    hydrALAZINE (APRESOLINE) 20 mg/mL injection 20 mg  20 mg IntraVENous Q4H PRN    0.9% sodium chloride infusion 250 mL  250 mL IntraVENous PRN    cefTRIAXone (ROCEPHIN) 1 g in 0.9% sodium chloride (MBP/ADV) 50 mL 1 g IntraVENous Q24H    metoprolol succinate (TOPROL-XL) tablet 200 mg  200 mg Oral BID    hydrALAZINE (APRESOLINE) tablet 50 mg  50 mg Oral TID    clonazePAM (KlonoPIN) tablet 0.25 mg  0.25 mg Oral BID PRN    aspirin chewable tablet 81 mg  81 mg Oral DAILY    fluticasone propionate (FLONASE) 50 mcg/actuation nasal spray 2 Spray  2 Spray Both Nostrils DAILY    pantoprazole (PROTONIX) tablet 40 mg  40 mg Oral ACB    sodium chloride (NS) flush 5-40 mL  5-40 mL IntraVENous Q8H    sodium chloride (NS) flush 5-40 mL  5-40 mL IntraVENous PRN    acetaminophen (TYLENOL) tablet 650 mg  650 mg Oral Q4H PRN    ondansetron (ZOFRAN ODT) tablet 4 mg  4 mg Oral Q4H PRN    senna-docusate (PERICOLACE) 8.6-50 mg per tablet 1 Tab  1 Tab Oral BID PRN    influenza vaccine 2019-20 (6 mos+)(PF) (FLUARIX/FLULAVAL/FLUZONE QUAD) injection 0.5 mL  0.5 mL IntraMUSCular PRIOR TO DISCHARGE       Other Studies (last 24 hours):  No results found. Assessment and Plan:     Hospital Problems as of 6/23/2020 Date Reviewed: 9/20/2019          Codes Class Noted - Resolved POA    CKD (chronic kidney disease) stage 3, GFR 30-59 ml/min (HCC) (Chronic) ICD-10-CM: N18.3  ICD-9-CM: 585.3  6/4/2020 - Present Yes        Dementia without behavioral disturbance (HCC) (Chronic) ICD-10-CM: F03.90  ICD-9-CM: 294.20  7/23/2019 - Present Yes    Overview Signed 12/19/2019  3:50 PM by Usman May MD     Last Assessment & Plan:   - moderate stage, with hallucinations. This could also have vascular involvement rather than LBD since she's had symptoms for more than 10 years. - discussed moderate stage  - discussed possible need for NH in future, gave information on [facility] program for NH type care at home which she may need in the near future. Gave application forms, numbers to call, [employer] respite voucher to hire companions in now. - will ask SW to help with MOW.              Type 2 diabetes with nephropathy (HCC) (Chronic) ICD-10-CM: E11.21  ICD-9-CM: 250.40, 583.81  5/10/2018 - Present Yes        Type 2 diabetes mellitus with diabetic polyneuropathy, without long-term current use of insulin (HCC) (Chronic) ICD-10-CM: E11.42  ICD-9-CM: 250.60, 357.2  9/15/2017 - Present Yes        Essential hypertension (Chronic) ICD-10-CM: I10  ICD-9-CM: 401.9  Unknown - Present Yes        Anemia (Chronic) ICD-10-CM: D64.9  ICD-9-CM: 285. 9  Unknown - Present Yes        * (Principal) RESOLVED: SHERIE (acute kidney injury) ([facility] [address] ICD-10-CM: N17.9  ICD-9-CM: 584.9  6/18/2020 - 6/23/2020 Yes        RESOLVED: Hyperkalemia ICD-10-CM: E87.5  ICD-9-CM: 276.7  6/18/2020 - 6/23/2020 Yes        RESOLVED: Hyponatremia ICD-10-CM: E87.1  ICD-9-CM: 276.1  5/21/2019 - 6/23/2020 Yes        RESOLVED: UTI (urinary tract infection) ICD-10-CM: N39.0  ICD-9-CM: 599.0  4/10/2018 - 6/23/2020 Yes              PLAN:    · Acute on chronic kidney disease stage IIIresolved. Baseline Cr 1.5  · HTN - restart ACEi today  · Concern for UTI complete abx today  · Hypomagnesemia repleted  · anemia stable. Start iron due to iron def on labs  · Hypokalemia  Replete  · Depression and early dementia-continue supportive care  · Diabetes monitor blood sugars and cover with insulin    DC planning/Dispo: DC to  SNF when bed ready.   Medically stable enough for DC  DVT ppx: SCDs    Signed:  Maykel Smith MD

## 2020-06-23 NOTE — PROGRESS NOTES
Problem: Falls - Risk of  Goal: *Absence of Falls  Description: Document Airam Blood Fall Risk and appropriate interventions in the flowsheet.   Outcome: Progressing Towards Goal  Note: Fall Risk Interventions:  Mobility Interventions: Communicate number of staff needed for ambulation/transfer, Bed/chair exit alarm, Patient to call before getting OOB    Mentation Interventions: Adequate sleep, hydration, pain control, Bed/chair exit alarm, Door open when patient unattended, Evaluate medications/consider consulting pharmacy, More frequent rounding, Reorient patient, Room close to nurse's station    Medication Interventions: Bed/chair exit alarm, Evaluate medications/consider consulting pharmacy, Patient to call before getting OOB, Teach patient to arise slowly    Elimination Interventions: Call light in reach, Patient to call for help with toileting needs, Bed/chair exit alarm

## 2020-06-23 NOTE — PROGRESS NOTES
This CM spoke with pt son this day regarding discharge planning. Informed him that [facility] and [facility] are able to offer short term rehab bed. Pt son would like to accept [facility]. This CM explained that the next step is prior authorization with pt insurance and that ultimate decision for STR will be up to them - he verbalized understanding. No additional CM needs at this. Prior authorization with insurance initiated this day.

## 2020-06-24 VITALS
HEIGHT: 62 IN | DIASTOLIC BLOOD PRESSURE: 72 MMHG | SYSTOLIC BLOOD PRESSURE: 166 MMHG | OXYGEN SATURATION: 98 % | TEMPERATURE: 97.4 F | WEIGHT: 143.8 LBS | HEART RATE: 77 BPM | BODY MASS INDEX: 26.46 KG/M2 | RESPIRATION RATE: 18 BRPM

## 2020-06-24 LAB
ANION GAP SERPL CALC-SCNC: 13 MMOL/L (ref 7–16)
BUN SERPL-MCNC: 11 MG/DL (ref 8–23)
CALCIUM SERPL-MCNC: 6.6 MG/DL (ref 8.3–10.4)
CHLORIDE SERPL-SCNC: 113 MMOL/L (ref 98–107)
CO2 SERPL-SCNC: 18 MMOL/L (ref 21–32)
CREAT SERPL-MCNC: 1.49 MG/DL (ref 0.6–1)
GLUCOSE SERPL-MCNC: 107 MG/DL (ref 65–100)
POTASSIUM SERPL-SCNC: 3 MMOL/L (ref 3.5–5.1)
SODIUM SERPL-SCNC: 144 MMOL/L (ref 136–145)

## 2020-06-24 PROCEDURE — 74011250637 HC RX REV CODE- 250/637: Performed by: INTERNAL MEDICINE

## 2020-06-24 PROCEDURE — 99218 HC RM OBSERVATION: CPT

## 2020-06-24 PROCEDURE — 36415 COLL VENOUS BLD VENIPUNCTURE: CPT

## 2020-06-24 PROCEDURE — 80048 BASIC METABOLIC PNL TOTAL CA: CPT

## 2020-06-24 RX ORDER — CLONAZEPAM 0.5 MG/1
0.25 TABLET ORAL
Qty: 6 TAB | Refills: 0 | Status: SHIPPED | OUTPATIENT
Start: 2020-06-24

## 2020-06-24 RX ORDER — BENAZEPRIL HYDROCHLORIDE 40 MG/1
40 TABLET ORAL DAILY
Qty: 180 TAB | Refills: 3 | Status: SHIPPED
Start: 2020-06-24

## 2020-06-24 RX ORDER — AMLODIPINE BESYLATE 5 MG/1
5 TABLET ORAL DAILY
Status: DISCONTINUED | OUTPATIENT
Start: 2020-06-24 | End: 2020-06-24 | Stop reason: HOSPADM

## 2020-06-24 RX ORDER — LANOLIN ALCOHOL/MO/W.PET/CERES
325 CREAM (GRAM) TOPICAL
Qty: 30 TAB | Refills: 2 | Status: SHIPPED | OUTPATIENT
Start: 2020-06-25

## 2020-06-24 RX ADMIN — ASPIRIN 81 MG 81 MG: 81 TABLET ORAL at 09:56

## 2020-06-24 RX ADMIN — PANTOPRAZOLE SODIUM 40 MG: 40 TABLET, DELAYED RELEASE ORAL at 05:51

## 2020-06-24 RX ADMIN — METOPROLOL SUCCINATE 200 MG: 100 TABLET, EXTENDED RELEASE ORAL at 09:56

## 2020-06-24 RX ADMIN — Medication 10 ML: at 05:53

## 2020-06-24 RX ADMIN — FERROUS SULFATE TAB 325 MG (65 MG ELEMENTAL FE) 325 MG: 325 (65 FE) TAB at 09:56

## 2020-06-24 RX ADMIN — LISINOPRIL 40 MG: 20 TABLET ORAL at 09:56

## 2020-06-24 RX ADMIN — AMLODIPINE BESYLATE 5 MG: 5 TABLET ORAL at 12:00

## 2020-06-24 RX ADMIN — FLUTICASONE PROPIONATE 2 SPRAY: 50 SPRAY, METERED NASAL at 09:58

## 2020-06-24 RX ADMIN — HYDRALAZINE HYDROCHLORIDE 50 MG: 50 TABLET, FILM COATED ORAL at 09:56

## 2020-06-24 NOTE — PROGRESS NOTES
Please verify patient is to be on ACHS sliding scale. No coverage ordered at this time, however patient has DM2 , per history.

## 2020-06-24 NOTE — ROUTINE PROCESS
TRANSFER - OUT REPORT: 
 
Verbal report given to [facility] on Nel Joyce  being transferred to [facility] for routine progression of care Report consisted of patients Situation, Background, Assessment and  
Recommendations(SBAR). Information from the following report(s) SBAR, Kardex, OR Summary, Procedure Summary, Intake/Output and MAR was reviewed with the receiving nurse. Lines:    
 
Opportunity for questions and clarification was provided. Patient transported with: 
 [facility]

## 2020-06-24 NOTE — PROGRESS NOTES
Pt with discharge orders this day. Pt approved by insurance to transition to [facility] for [employer]. This CM informed pt son who remains agreeable with discharge plan. Informed pt son of transport time of 1:00pm via [employer] pickup - he is agreeable. This CM informed pt of discharge this day - she is agreeable. No additional CM needs at discharge. Milestones met. Care Management Interventions  PCP Verified by CM:  Yes  Mode of Transport at Discharge: BLS([facility])  Transition of Care Consult (CM Consult): Discharge Planning, SNF  Discharge Durable Medical Equipment: No  Physical Therapy Consult: Yes  Occupational Therapy Consult: Yes  Speech Therapy Consult: No  Current Support Network: Own Home, Relative's Home, Lives with Caregiver  Confirm Follow Up Transport: Other (see comment)([facility])  The Plan for Transition of Care is Related to the Following Treatment Goals : SNF for STR  The Patient and/or Patient Representative was Provided with a Choice of Provider and Agrees with the Discharge Plan?: Yes  Name of the Patient Representative Who was Provided with a Choice of Provider and Agrees with the Discharge Plan: Mr. Alex Trivedi (son)   Freedom of Choice List was Provided with Basic Dialogue that Supports the Patient's Individualized Plan of Care/Goals, Treatment Preferences and Shares the Quality Data Associated with the Providers?: Yes  [employer] Information Provided?: No  Discharge Location  Discharge Placement: Skilled nursing facility

## 2020-06-24 NOTE — DISCHARGE SUMMARY
Hospitalist Discharge Summary     Admit Date:  2020  6:12 PM   DC date:    Name:  Patrick Ochoa   Age:  81 y.o.  :  1935   MRN:  [id number]   PCP:  Jose Damon MD  Treatment Team: Attending Provider: Wilmer Roman MD; Primary Nurse: Josseline Merida, YOHANA; Utilization Review: Destini De Souza RN; Care Manager: Carol Roldan; Physical Therapy Assistant: Sandra Figueroa PTA    Problem List for this Hospitalization:  Hospital Problems as of 2020 Date Reviewed: 2019          Codes Class Noted - Resolved POA    CKD (chronic kidney disease) stage 3, GFR 30-59 ml/min (HCC) (Chronic) ICD-10-CM: N18.3  ICD-9-CM: 585.3  2020 - Present Yes        Dementia without behavioral disturbance ([facility] [address] (Chronic) ICD-10-CM: F03.90  ICD-9-CM: 294.20  2019 - Present Yes    Overview Signed 2019  3:50 PM by Jose Damon MD     Last Assessment & Plan:   - moderate stage, with hallucinations. This could also have vascular involvement rather than LBD since she's had symptoms for more than 10 years. - discussed moderate stage  - discussed possible need for NH in future, gave information on CLTC program for NH type care at home which she may need in the near future. Gave application forms, numbers to call, [employer] respite voucher to hire companions in now. - will ask SW to help with MOW. Type 2 diabetes with nephropathy (HCC) (Chronic) ICD-10-CM: E11.21  ICD-9-CM: 250.40, 583.81  5/10/2018 - Present Yes        Type 2 diabetes mellitus with diabetic polyneuropathy, without long-term current use of insulin (HCC) (Chronic) ICD-10-CM: E11.42  ICD-9-CM: 250.60, 357.2  9/15/2017 - Present Yes        Essential hypertension (Chronic) ICD-10-CM: I10  ICD-9-CM: 401.9  Unknown - Present Yes        Anemia (Chronic) ICD-10-CM: D64.9  ICD-9-CM: 285. 9  Unknown - Present Yes        * (Principal) RESOLVED: SHERIE (acute kidney injury) ([facility] [address] ICD-10-CM: N17.9  ICD-9-CM: 584.9 6/18/2020 - 6/23/2020 Yes        RESOLVED: Hyperkalemia ICD-10-CM: E87.5  ICD-9-CM: 276.7  6/18/2020 - 6/23/2020 Yes        RESOLVED: Hyponatremia ICD-10-CM: E87.1  ICD-9-CM: 276.1  5/21/2019 - 6/23/2020 Yes        RESOLVED: UTI (urinary tract infection) ICD-10-CM: N39.0  ICD-9-CM: 599.0  4/10/2018 - 6/23/2020 Yes                Admission HPI from 6/18/2020:    \" Carol Canchola is a 81 y.o. female with a history of early dementia, hypertension, type 2 diabetes mellitus, depression presenting with dehydration. Patient lives at home with her son, the son reports that she has not been eating for the past few days. He states that she is unable to eat much at the time and does not eat at all on her own volition. She reports feeling weak and typically does not ambulate at all at home. When he finds her home she is been in the same place on movement and has not eaten at all. He notes some confusion and disorientation and she often does not know where she is. He called EMS today and she was found to have a blood glucose of 50 at home even after eating a meal.  Her blood pressure was 100/50. Patient was brought to the ED. Of note patient was recently treated for UTI with Macrobid. Patient herself states that she feels fine and states she does not know why she is in the hospital and wants to go home.  \"    Hospital Course:  As noted from previous provider: \" The patient is an 71-year-old  lady with HTN, DM type II, CKD stage III with baseline creatinine of 1.5-1.6, chronic normocytic anemia with recent baseline hemoglobin of 8.1, depression, history of kidney stones, early dementia, is presently managed for SHERIE on CKD, acute on chronic anemia, UTI, hypomagnesemia and hyperkalemia.  She is presently being hydrated with IV fluids and was initiated on IV ceftriaxone.  She denies any significant complaints, actually denies any significant dysuria, shortness of breath or cough.  Her main complaint is generalized weakness.  As per the son, she was also confused, but now her mental status is back to baseline and she is alert.  Hypokalemia has resolved, as the potassium came down from 6.6 on admission to 3.5. Annie Ochoa renal function is also improved, with her creatinine down from 3.24 on admission to 1. 80.  Her hemoglobin improved from 6.7 to 8.3 after administration of 1 PRBC, and it is actually up to 9.1 today.  She was seen by physical therapy and was deemed appropriate for discharge to SNF for rehabilitation.  The patient is actually agreeable to go to rehab\" . Pt remained stable waiting for rehab on the days that I saw her and she was approved to go today. Needs to continue PO iron. Would only take benazepril once daily; if she needs more BP control, would add norvasc or another agent instead of going beyond standard ACEi doses    Disposition: Skilled Nursing Facility  Activity: PT/OT Eval and Treat  Diet: DIET REGULAR  DIET NUTRITIONAL SUPPLEMENTS All Meals; Ingrid Lepe ( )  Code Status: Full Code    Follow Up Orders:  No orders of the defined types were placed in this encounter. Follow-up Information     Follow up With Specialties Details Why [address] [facility] today [facility]hab [address] [facility]    Clemente Hernandez MD [facility] Call As needed [address] [address] [phone]            Discharge meds at bottom of this note. Plan was discussed with pt, son. All questions answered. Patient was stable at time of discharge. Given instructions to call a physician or return if any concerns. Discharge summary and encounter summary was sent to PCP electronically via \"Comm Mgt\" link in [facility], if possible.     Diagnostic Imaging/Tests:   Us Retroperitoneum Comp    Result Date: 6/19/2020  RENAL ULTRASOUND INDICATION:  Renal failure COMPARISON: None. TECHNIQUE: Real-time sonography of the kidneys, retroperitoneum and bladder was performed with multiple static images obtained. FINDINGS: The right kidney measures 11.0 cm and the left kidney measures 10.0 cm in length. Moderate bilateral hydronephrosis   Simple appearing cyst of the right kidney. The aorta and IVC are normal in caliber. Apparent debris dependently in the urinary bladder. IMPRESSION: Moderate bilateral hydronephrosis. Echocardiogram results:  No results found for this visit on 06/18/20. Procedures done this admission:  * No surgery found *    All Micro Results     Procedure Component Value Units Date/Time    EMERGENT DISEASE PANEL [[id number]] Collected:  06/20/20 1812    Order Status:  Completed Specimen:  Not Specified Updated:  06/23/20 0644     Emergent disease panel Not detected        Comment: Performed at [address]  06/23/20, ADS         CULTURE, URINE [[id number]] Collected:  06/18/20 2217    Order Status:  Completed Specimen:  Urine from Clean catch Updated:  06/21/20 1010     Special Requests: NO SPECIAL REQUESTS        Culture result:       >100,000 COLONIES/mL MIXED SKIN BATSHEVA ISOLATED                  THREE OR MORE TYPES OF ORGANISMS ARE PRESENT. THIS IS INDICATIVE OF CONTAMINATION DUE TO IMPROPER COLLECTION TECHNIQUE. PLEASE REPEAT COLLECTION UNLESS PATIENT HAS STARTED ANTIBIOTIC TREATMENT.           CULTURE, BLOOD [[id number]]     Order Status:  Canceled Specimen:  Blood     CULTURE, BLOOD [[id number]]     Order Status:  Canceled Specimen:  Blood           SARS-CoV-2 Lab Results  \"Novel Coronavirus\" Test: No results found for: COV2NT   \"Emergent Disease\" Test:   Lab Results   Component Value Date/Time    EDPR Not detected 06/20/2020 06:12 PM     \"SARS-COV-2\" Test: No results found for: XGCOVT  As of: 11:31 AM on 6/24/2020        Labs: Results:       BMP, Mg, Phos Recent Labs     06/24/20  0547 06/23/20  0648 06/22/20  0319    148* 146*   K 3.0* 3.0* 3.1*   * 120* 119*   CO2 18* 15* 16*   AGAP 13 13 11   BUN 11 10 14   CREA 1.49* 1.49* 1.59*   CA 6.6* 6.3* 6.4*   * 110* 99      CBC Recent Labs     06/23/20  0648 06/22/20  0319   WBC 10.7 11.2*   RBC 3.02* 3.07*   HGB 8.8* 9.1*   HCT 28.0* 28.2*    401   GRANS 62  --    LYMPH 27  --    EOS 3  --    MONOS 8  --    BASOS 0  --    IG 1  --    ANEU 6.6  --    ABL 2.8  --    OLAMIDE 0.4  --    ABM 0.8  --    ABB 0.0  --    AIG 0.1  --       LFT No results for input(s): ALT, TBIL, AP, TP, ALB, GLOB, AGRAT in the last 72 hours.     No lab exists for component: SGOT, GPT   Cardiac Testing Lab Results   Component Value Date/Time    BNP 53 07/15/2015 09:25 PM    CK 37 06/19/2020 05:04 AM    CK 92 02/20/2018 07:03 AM    Troponin-I, Qt. <0.02 (L) 02/20/2018 07:03 AM      Coagulation Tests Lab Results   Component Value Date/Time    Prothrombin time 13.4 04/10/2018 10:50 AM    INR 1.1 04/10/2018 10:50 AM    aPTT 33.8 04/10/2018 10:50 AM      A1c Lab Results   Component Value Date/Time    Hemoglobin A1c 6.2 (H) 06/04/2020 11:50 AM    Hemoglobin A1c 6.0 (H) 08/15/2018 12:31 PM    Hemoglobin A1c 5.7 (H) 05/10/2018 02:31 PM      Lipid Panel Lab Results   Component Value Date/Time    Cholesterol, total 111 06/04/2020 11:50 AM    HDL Cholesterol 48 06/04/2020 11:50 AM    LDL, calculated 43 06/04/2020 11:50 AM    VLDL, calculated 20 06/04/2020 11:50 AM    Triglyceride 99 06/04/2020 11:50 AM      Thyroid Panel Lab Results   Component Value Date/Time    TSH 3.930 (H) 06/19/2020 05:04 AM        Most Recent UA Lab Results   Component Value Date/Time    Color YELLOW 04/10/2018 10:00 AM    Appearance TURBID 04/10/2018 10:00 AM    Specific gravity 1.010 04/10/2018 10:00 AM    pH (UA) 6.0 04/10/2018 10:00 AM    Protein 100 (A) 04/10/2018 10:00 AM    Glucose NEGATIVE  04/10/2018 10:00 AM    Ketone NEGATIVE  04/10/2018 10:00 AM    Bilirubin NEGATIVE  04/10/2018 10:00 AM    Blood NEGATIVE  04/10/2018 10:00 AM Urobilinogen 0.2 04/10/2018 10:00 AM    Nitrites NEGATIVE  04/10/2018 10:00 AM    Leukocyte Esterase LARGE (A) 04/10/2018 10:00 AM    WBC >100 06/18/2020 10:17 PM    RBC 20-50 06/18/2020 10:17 PM    Epithelial cells 5-10 06/18/2020 10:17 PM    Bacteria 4+ (H) 06/18/2020 10:17 PM    Casts 0 06/18/2020 10:17 PM    Crystals, urine 0 06/18/2020 10:17 PM    Mucus 0 06/18/2020 10:17 PM    Other observations RESULTS VERIFIED MANUALLY 06/18/2020 10:17 PM        Allergies   Allergen Reactions    Pcn [Penicillins] Unknown (comments)    Sulfa (Sulfonamide Antibiotics) Unknown (comments) and Other (comments)     Unknown reaction     Immunization History   Administered Date(s) Administered    TB Skin Test (PPD) Intradermal 04/10/2018, 06/20/2020       All Labs from Last 24 Hrs:  Recent Results (from the past 24 hour(s))   PLEASE READ & DOCUMENT PPD TEST IN 72 HRS    Collection Time: 06/23/20  7:08 PM   Result Value Ref Range    PPD Negative Negative    mm Induration 0 0 - 5 mm   METABOLIC PANEL, BASIC    Collection Time: 06/24/20  5:47 AM   Result Value Ref Range    Sodium 144 136 - 145 mmol/L    Potassium 3.0 (L) 3.5 - 5.1 mmol/L    Chloride 113 (H) 98 - 107 mmol/L    CO2 18 (L) 21 - 32 mmol/L    Anion gap 13 7 - 16 mmol/L    Glucose 107 (H) 65 - 100 mg/dL    BUN 11 8 - 23 MG/DL    Creatinine 1.49 (H) 0.6 - 1.0 MG/DL    GFR est AA 43 (L) >60 ml/min/1.73m2    GFR est non-AA 35 (L) >60 ml/min/1.73m2    Calcium 6.6 (L) 8.3 - 10.4 MG/DL       Discharge Exam:  Patient Vitals for the past 24 hrs:   Temp Pulse Resp BP SpO2   06/24/20 0956  78  157/74    06/24/20 0700 97.4 °F (36.3 °C) 81 18 171/71 98 %   06/24/20 0316 98.4 °F (36.9 °C) 77 16 150/66 97 %   06/23/20 2132 98.4 °F (36.9 °C) 85 16 159/70 98 %   06/23/20 1842 97.6 °F (36.4 °C) 90 16 168/75 97 %   06/23/20 1521 98.5 °F (36.9 °C) 85 16 151/75 98 %     Oxygen Therapy  O2 Sat (%): 98 % (06/24/20 0700)  Pulse via Oximetry: 75 beats per minute (06/18/20 2159)  O2 Device: Room air (06/22/20 1901)    Estimated body mass index is 26.3 kg/m² as calculated from the following:    Height as of this encounter: 5' 2\" (1.575 m). Weight as of this encounter: 65.2 kg (143 lb 12.8 oz). Intake/Output Summary (Last 24 hours) at 6/24/2020 1131  Last data filed at 6/24/2020 0902  Gross per 24 hour   Intake 640 ml   Output 0 ml   Net 640 ml       *Note that automatically entered I/Os may not be accurate; dependent on patient compliance with collection and accurate  by assistants. General:    Well nourished. Alert. Eyes:   Normal sclerae. Extraocular movements intact. ENT:  Normocephalic, atraumatic. Moist mucous membranes  CV:   Regular rate and rhythm. No murmur, rub, or gallop. Lungs:  Clear to auscultation bilaterally. No wheezing, rhonchi, or rales. Abdomen: Soft, nontender, nondistended. Extremities: Warm and dry. No cyanosis or edema. Neurologic: CN II-XII grossly intact. No gross focal deficits   Skin:     No rashes or jaundice. Psych:  Normal mood and affect.     Current Med List in Hospital:   Current Facility-Administered Medications   Medication Dose Route Frequency    amLODIPine (NORVASC) tablet 5 mg  5 mg Oral DAILY    ferrous sulfate tablet 325 mg  1 Tab Oral DAILY WITH BREAKFAST    lisinopriL (PRINIVIL, ZESTRIL) tablet 40 mg  40 mg Oral DAILY    hydrALAZINE (APRESOLINE) 20 mg/mL injection 20 mg  20 mg IntraVENous Q4H PRN    0.9% sodium chloride infusion 250 mL  250 mL IntraVENous PRN    metoprolol succinate (TOPROL-XL) tablet 200 mg  200 mg Oral BID    hydrALAZINE (APRESOLINE) tablet 50 mg  50 mg Oral TID    clonazePAM (KlonoPIN) tablet 0.25 mg  0.25 mg Oral BID PRN    aspirin chewable tablet 81 mg  81 mg Oral DAILY    fluticasone propionate (FLONASE) 50 mcg/actuation nasal spray 2 Spray  2 Spray Both Nostrils DAILY    pantoprazole (PROTONIX) tablet 40 mg  40 mg Oral ACB    sodium chloride (NS) flush 5-40 mL  5-40 mL IntraVENous Q8H    sodium chloride (NS) flush 5-40 mL  5-40 mL IntraVENous PRN    acetaminophen (TYLENOL) tablet 650 mg  650 mg Oral Q4H PRN    ondansetron (ZOFRAN ODT) tablet 4 mg  4 mg Oral Q4H PRN    senna-docusate (PERICOLACE) 8.6-50 mg per tablet 1 Tab  1 Tab Oral BID PRN    influenza vaccine 2019-20 (6 mos+)(PF) (FLUARIX/FLULAVAL/FLUZONE QUAD) injection 0.5 mL  0.5 mL IntraMUSCular PRIOR TO DISCHARGE       Discharge Info:   Current Discharge Medication List      START taking these medications    Details   ferrous sulfate 325 mg (65 mg iron) tablet Take 1 Tab by mouth daily (with breakfast). Indications: anemia from inadequate iron  Qty: 30 Tab, Refills: 2         CONTINUE these medications which have CHANGED    Details   benazepriL (LOTENSIN) 40 mg tablet Take 1 Tab by mouth daily. TAKE 1 TABLET BY MOUTH TWICE A DAY  Qty: 180 Tab, Refills: 3    Associated Diagnoses: Essential hypertension         CONTINUE these medications which have NOT CHANGED    Details   DANDELION PO Take 1 Tab by mouth daily. Indications: a type of joint disorder due to excess uric acid in the blood called gout      multivitamin with minerals (ONE-A-DAY 50 PLUS PO) Take  by mouth. NO IRON      calcium-cholecalciferol, D3, (CALTRATE 600+D) tablet Take 1 Tab by mouth daily (with lunch). melatonin 3 mg tablet Take 3 mg by mouth nightly. loperamide (IMMODIUM) 2 mg tablet Take 2 mg by mouth four (4) times daily as needed for Diarrhea. fluticasone propionate (Flonase Allergy Relief) 50 mcg/actuation nasal spray 2 Sprays by Both Nostrils route daily. verapamil (CALAN) 40 mg tablet TAKE 1 TABLET BY MOUTH TWICE A DAY  Indications: high blood pressure  Qty: 180 Tab, Refills: 3    Associated Diagnoses: Essential hypertension      metoprolol succinate (TOPROL-XL) 200 mg XL tablet Take 1 Tab by mouth two (2) times a day.   Qty: 180 Tab, Refills: 3    Associated Diagnoses: Essential hypertension      cloNIDine HCl (CATAPRES) 0.1 mg tablet Take 1 Tab by mouth two (2) times a day. Indications: high blood pressure  Qty: 180 Tab, Refills: 3    Associated Diagnoses: Essential hypertension      hydrALAZINE (APRESOLINE) 50 mg tablet Take 1 Tab by mouth three (3) times daily. Qty: 270 Tab, Refills: 4    Associated Diagnoses: Essential hypertension      omeprazole (PRILOSEC) 20 mg capsule TAKE ONE CAPSULE BY MOUTH EVERY DAY  Qty: 30 Cap, Refills: 11    Associated Diagnoses: Gastroesophageal reflux disease, esophagitis presence not specified      clonazePAM (KLONOPIN) 0.5 mg tablet Take 0.5 Tabs by mouth two (2) times daily as needed (anxiety). Max Daily Amount: 0.5 mg. TAKE 1 TABLET BY MOUTH TWICE A DAY  Indications: anxiety  Qty: 30 Tab, Refills: 5    Associated Diagnoses: MOISES (generalized anxiety disorder)      ACETYLCARNITINE HCL (ACETYL L-CARNITINE PO) Take 1 Tab by mouth daily (after lunch). KRILL OIL PO Take  by mouth. aspirin 81 mg chewable tablet Take 81 mg by mouth daily. ergocalciferol (ERGOCALCIFEROL) 50,000 unit capsule Take 1 Cap by mouth every seven (7) days. Indications: osteoporosis, a condition of weak bones, low vitamin D levels  Qty: 12 Cap, Refills: 4    Associated Diagnoses: Vitamin D deficiency      glucose blood VI test strips (ACCU-CHEK OVIDIO PLUS TEST STRP) strip TEST ONE TIME DAILY  Qty: 100 Strip, Refills: 3    Associated Diagnoses: Type 2 diabetes mellitus with diabetic polyneuropathy, without long-term current use of insulin ([address] Type 2 diabetes with nephropathy ([address] Controlled type 2 diabetes mellitus with hyperglycemia, with long-term current use of insulin (HCC)      lancets (ACCU-CHEK SOFTCLIX LANCETS) misc USE AS DIRECTED EVERY DAY  Qty: 100 Each, Refills: 3    Associated Diagnoses: Type 2 diabetes mellitus with diabetic polyneuropathy, without long-term current use of insulin ([address] Type 2 diabetes with nephropathy ([address]  Controlled type 2 diabetes mellitus with hyperglycemia, with long-term current use of insulin (HCC)      metFORMIN ER (GLUCOPHAGE XR) 500 mg tablet TAKE ONE TABLET BY MOUTH ONE TIME DAILY  Qty: 30 Tab, Refills: 11    Associated Diagnoses: Type 2 diabetes mellitus with diabetic polyneuropathy, without long-term current use of insulin ([facility])      alendronate (FOSAMAX) 70 mg tablet Take 1 Tab by mouth every seven (7) days. Qty: 12 Tab, Refills: 4    Associated Diagnoses: Age-related osteoporosis with current pathological fracture, sequela      Blood-Glucose Meter (ACCU-CHEK OVIDIO PLUS METER) misc TEST ONE TIME DAILY  Qty: 1 Each, Refills: 0    Associated Diagnoses: Type 2 diabetes mellitus with diabetic polyneuropathy, without long-term current use of insulin ([facility])      furosemide (LASIX) 20 mg tablet TAKE 1 TABLET BY MOUTH EVERY DAY as needed  Qty: 30 Tab, Refills: 2    Associated Diagnoses: Localized edema         STOP taking these medications       nitrofurantoin, macrocrystal-monohydrate, (MACROBID) 100 mg capsule Comments:   Reason for Stopping:                 Time spent in patient discharge planning and coordination 35 minutes.     Signed:  Srikanth Blanco MD

## 2020-06-24 NOTE — PROGRESS NOTES
END OF SHIFT NOTE:    INTAKE/OUTPUT    6/23 1900 - 6/24 0700  PO: 240 ml  IVF: 150 ml  VOIDED: 3 brief changes. Total of 5 occurences. 2 changes were copious amount of urine. Flatus: Patient does not have flatus present, per patient    Stool:  0 occurrences. Emesis: 0 occurrences. VITAL SIGNS  Patient Vitals for the past 12 hrs:   Temp Pulse Resp BP SpO2   06/24/20 0316 98.4 °F (36.9 °C) 77 16 150/66 97 %   06/23/20 2132 98.4 °F (36.9 °C) 85 16 159/70 98 %   06/23/20 1842 97.6 °F (36.4 °C) 90 16 168/75 97 %     Pain Assessment  Pain Intensity 1: 0 (06/23/20 2132)  Pain Location 1: Hand  Pain Intervention(s) 1: Medication (see MAR)  Patient Stated Pain Goal: 0    Ambulating  Yes    Shift report to be given to oncoming nurse at the bedside.     [facility]

## 2020-06-25 ENCOUNTER — PATIENT OUTREACH (OUTPATIENT)
Dept: CASE MANAGEMENT | Age: 85
End: 2020-06-25

## 2020-06-25 NOTE — PROGRESS NOTES
Transition of care outreach postponed for 14 days due to patient's discharge to SNF. [address] This note will not be viewable in [address]

## 2020-07-10 ENCOUNTER — HOME HEALTH ADMISSION (OUTPATIENT)
Dept: HOME HEALTH SERVICES | Facility: HOME HEALTH | Age: 85
End: 2020-07-10

## 2020-07-13 ENCOUNTER — PATIENT OUTREACH (OUTPATIENT)
Dept: CASE MANAGEMENT | Age: 85
End: 2020-07-13

## 2020-07-13 NOTE — PROGRESS NOTES
Community Care Team documentation for patient in [facility]    The information below provided by: Priyank Hinojosa    PT Update: discharged        Nursing Update:stable       Discharge Date:  7/12/20 Home with son and [facility]    Assign to [facility]

## 2020-07-15 ENCOUNTER — PATIENT OUTREACH (OUTPATIENT)
Dept: CASE MANAGEMENT | Age: 85
End: 2020-07-15

## 2020-07-15 NOTE — PROGRESS NOTES
This Care Coordinator spoke with patient's son for a ZEINAB f/u. Patient 's son states that patient was admitted to [facility] and will be d/c with Hospice due to Dementia. Episode Resolved.

## 2020-12-14 NOTE — PROGRESS NOTES
Pt uncooperative, confused, attempting to climb out of bed, yelling, pulling IV tubing, kicking staff. Tried hand mitts, will not keep on. Dr Nii Youssef notified, placed orders. Schedule her at 9:30 a.m. on Friday if she can do that.

## 2022-09-30 NOTE — ED TRIAGE NOTES
Pt complains left shoulder pain following fall. EMS called to home by son. PT does not recall walking from bed or falling. No head injury. Pt also complains of left hip apin. No indicators present

## 2023-01-30 NOTE — ED TRIAGE NOTES
Pt arrived via ems for hip pain. Pt states she tripped and fell last night hurting her right hip. Denies any loc or hitting her head.
 used

## (undated) DEVICE — BUTTON SWITCH PENCIL BLADE ELECTRODE, HOLSTER: Brand: EDGE

## (undated) DEVICE — STERILE LATEX POWDER-FREE SURGICAL GLOVESWITH NITRILE COATING: Brand: PROTEXIS

## (undated) DEVICE — Device

## (undated) DEVICE — SURGICAL PROCEDURE PACK BASIC ST FRANCIS

## (undated) DEVICE — (D)DRAPE ISOL ANTIMC 129X100IN -- DISC BY MFR

## (undated) DEVICE — OCCLUSIVE GAUZE STRIP,3% BISMUTH TRIBROMOPHENATE IN PETROLATUM BLEND: Brand: XEROFORM

## (undated) DEVICE — X-LARGE COTTON GLOVE: Brand: DEROYAL

## (undated) DEVICE — SOLUTION IV 1000ML 0.9% SOD CHL

## (undated) DEVICE — SLIM BODY SKIN STAPLER: Brand: APPOSE ULC

## (undated) DEVICE — GLOVE SURG SZ 8.5 L11.6IN FNGR THK12.6MIL CUF THK8.3MIL BRN

## (undated) DEVICE — 1010 S-DRAPE TOWEL DRAPE 10/BX: Brand: STERI-DRAPE™

## (undated) DEVICE — REM POLYHESIVE ADULT PATIENT RETURN ELECTRODE: Brand: VALLEYLAB

## (undated) DEVICE — AMD ANTIMICROBIAL GAUZE SPONGES,12 PLY USP TYPE VII, 0.2% POLYHEXAMETHYLENE BIGUANIDE HCI (PHMB): Brand: CURITY

## (undated) DEVICE — (D)PREP SKN CHLRAPRP APPL 26ML -- CONVERT TO ITEM 371833